# Patient Record
Sex: MALE | Race: WHITE | NOT HISPANIC OR LATINO | Employment: UNEMPLOYED
[De-identification: names, ages, dates, MRNs, and addresses within clinical notes are randomized per-mention and may not be internally consistent; named-entity substitution may affect disease eponyms.]

---

## 2018-03-21 ENCOUNTER — MYAURORA ACCOUNT LINK (OUTPATIENT)
Dept: OTHER | Age: 58
End: 2018-03-21

## 2018-05-01 ENCOUNTER — APPOINTMENT (OUTPATIENT)
Dept: OCCUPATIONAL MEDICINE | Age: 58
End: 2018-05-01
Attending: FAMILY MEDICINE

## 2020-10-14 ENCOUNTER — APPOINTMENT (OUTPATIENT)
Dept: ULTRASOUND IMAGING | Facility: HOSPITAL | Age: 60
End: 2020-10-14
Attending: HOSPITALIST
Payer: COMMERCIAL

## 2020-10-14 ENCOUNTER — HOSPITAL ENCOUNTER (OUTPATIENT)
Facility: HOSPITAL | Age: 60
Setting detail: OBSERVATION
Discharge: HOME | End: 2020-10-15
Attending: EMERGENCY MEDICINE | Admitting: HOSPITALIST
Payer: COMMERCIAL

## 2020-10-14 DIAGNOSIS — R55 SYNCOPE AND COLLAPSE: Primary | ICD-10-CM

## 2020-10-14 DIAGNOSIS — E83.42 HYPOMAGNESEMIA: ICD-10-CM

## 2020-10-14 DIAGNOSIS — E87.1 HYPONATREMIA: ICD-10-CM

## 2020-10-14 DIAGNOSIS — E87.6 HYPOKALEMIA: ICD-10-CM

## 2020-10-14 PROCEDURE — 99220 INITIAL OBSERVATION CARE,LEVL III: CPT | Performed by: HOSPITALIST

## 2020-10-14 PROCEDURE — 93880 EXTRACRANIAL BILAT STUDY: CPT | Performed by: HOSPITALIST

## 2020-10-14 RX ORDER — CITALOPRAM 10 MG/1
10 TABLET ORAL DAILY
COMMUNITY
Start: 2020-09-25

## 2020-10-14 RX ORDER — PANTOPRAZOLE SODIUM 40 MG/1
40 TABLET, DELAYED RELEASE ORAL DAILY
Status: DISCONTINUED | OUTPATIENT
Start: 2020-10-14 | End: 2020-10-15

## 2020-10-14 RX ORDER — ESCITALOPRAM OXALATE 10 MG/1
10 TABLET ORAL DAILY
Status: DISCONTINUED | OUTPATIENT
Start: 2020-10-14 | End: 2020-10-15

## 2020-10-14 RX ORDER — SODIUM CHLORIDE 9 MG/ML
INJECTION, SOLUTION INTRAVENOUS CONTINUOUS
Status: DISCONTINUED | OUTPATIENT
Start: 2020-10-14 | End: 2020-10-15

## 2020-10-14 RX ORDER — LINAGLIPTIN 5 MG/1
5 TABLET, FILM COATED ORAL DAILY
COMMUNITY
Start: 2020-07-23

## 2020-10-14 RX ORDER — GABAPENTIN 300 MG/1
300 CAPSULE ORAL 2 TIMES DAILY
Status: DISCONTINUED | OUTPATIENT
Start: 2020-10-14 | End: 2020-10-15

## 2020-10-14 RX ORDER — SIMVASTATIN 20 MG
20 TABLET ORAL NIGHTLY
Status: ON HOLD | COMMUNITY
Start: 2020-09-08 | End: 2021-04-16 | Stop reason: ALTCHOICE

## 2020-10-14 RX ORDER — LORAZEPAM 1 MG/1
1 TABLET ORAL
Status: DISCONTINUED | OUTPATIENT
Start: 2020-10-14 | End: 2020-10-15

## 2020-10-14 RX ORDER — MULTIPLE VITAMINS W/ MINERALS TAB 9MG-400MCG
1 TAB ORAL DAILY
Status: DISCONTINUED | OUTPATIENT
Start: 2020-10-14 | End: 2020-10-15

## 2020-10-14 RX ORDER — SODIUM CHLORIDE 0.9 % (FLUSH) 0.9 %
3 SYRINGE (ML) INJECTION AS NEEDED
Status: DISCONTINUED | OUTPATIENT
Start: 2020-10-14 | End: 2020-10-15

## 2020-10-14 RX ORDER — LORAZEPAM 1 MG/1
2 TABLET ORAL
Status: DISCONTINUED | OUTPATIENT
Start: 2020-10-14 | End: 2020-10-15

## 2020-10-14 RX ORDER — ONDANSETRON 2 MG/ML
4 INJECTION INTRAMUSCULAR; INTRAVENOUS EVERY 6 HOURS PRN
Status: DISCONTINUED | OUTPATIENT
Start: 2020-10-14 | End: 2020-10-15

## 2020-10-14 RX ORDER — LORAZEPAM 2 MG/ML
2 INJECTION INTRAMUSCULAR
Status: DISCONTINUED | OUTPATIENT
Start: 2020-10-14 | End: 2020-10-15

## 2020-10-14 RX ORDER — CANAGLIFLOZIN 100 MG/1
100 TABLET, FILM COATED ORAL DAILY
COMMUNITY
Start: 2020-09-27

## 2020-10-14 RX ORDER — MELATONIN
100 DAILY
Status: DISCONTINUED | OUTPATIENT
Start: 2020-10-15 | End: 2020-10-15

## 2020-10-14 RX ORDER — LORAZEPAM 2 MG/ML
1 INJECTION INTRAMUSCULAR
Status: DISCONTINUED | OUTPATIENT
Start: 2020-10-14 | End: 2020-10-15

## 2020-10-14 RX ORDER — MAGNESIUM SULFATE HEPTAHYDRATE 40 MG/ML
2 INJECTION, SOLUTION INTRAVENOUS ONCE
Status: COMPLETED | OUTPATIENT
Start: 2020-10-14 | End: 2020-10-14

## 2020-10-14 RX ORDER — ACETAMINOPHEN 325 MG/1
650 TABLET ORAL EVERY 6 HOURS PRN
Status: DISCONTINUED | OUTPATIENT
Start: 2020-10-14 | End: 2020-10-15

## 2020-10-14 RX ORDER — METOCLOPRAMIDE HYDROCHLORIDE 5 MG/ML
10 INJECTION INTRAMUSCULAR; INTRAVENOUS EVERY 8 HOURS PRN
Status: DISCONTINUED | OUTPATIENT
Start: 2020-10-14 | End: 2020-10-15

## 2020-10-14 RX ORDER — DEXTROSE MONOHYDRATE 25 G/50ML
50 INJECTION, SOLUTION INTRAVENOUS
Status: DISCONTINUED | OUTPATIENT
Start: 2020-10-14 | End: 2020-10-15

## 2020-10-14 RX ORDER — PANTOPRAZOLE SODIUM 40 MG/1
40 TABLET, DELAYED RELEASE ORAL DAILY
COMMUNITY
Start: 2020-09-22

## 2020-10-14 RX ORDER — ERGOCALCIFEROL 1.25 MG/1
5000 CAPSULE ORAL
COMMUNITY
Start: 2020-09-27

## 2020-10-14 RX ORDER — GABAPENTIN 300 MG/1
300 CAPSULE ORAL 2 TIMES DAILY
COMMUNITY
Start: 2020-06-19

## 2020-10-14 RX ORDER — FOLIC ACID 1 MG/1
1 TABLET ORAL DAILY
Status: DISCONTINUED | OUTPATIENT
Start: 2020-10-14 | End: 2020-10-15

## 2020-10-14 RX ORDER — POTASSIUM CHLORIDE 20 MEQ/1
40 TABLET, EXTENDED RELEASE ORAL EVERY 4 HOURS
Status: COMPLETED | OUTPATIENT
Start: 2020-10-14 | End: 2020-10-14

## 2020-10-14 RX ORDER — POTASSIUM CHLORIDE 20 MEQ/1
40 TABLET, EXTENDED RELEASE ORAL ONCE
Status: COMPLETED | OUTPATIENT
Start: 2020-10-14 | End: 2020-10-14

## 2020-10-14 RX ORDER — LISINOPRIL 5 MG/1
5 TABLET ORAL DAILY
Status: ON HOLD | COMMUNITY
Start: 2020-09-25 | End: 2021-04-16 | Stop reason: ALTCHOICE

## 2020-10-14 RX ORDER — LISINOPRIL 5 MG/1
5 TABLET ORAL DAILY
Status: DISCONTINUED | OUTPATIENT
Start: 2020-10-14 | End: 2020-10-15

## 2020-10-14 NOTE — ED PROVIDER NOTES
Patient Seen in: [facility]       History   Patient presents with:  Syncope    Stated Complaint: syncope; ST elevation    HPI    64year old male with a history of hypertension, daily alcohol use who presents with syncope.   There was a STEMI alert General: He is not in acute distress. Appearance: He is well-developed. He is not toxic-appearing or diaphoretic. HENT:      Head: Normocephalic and atraumatic.       Right Ear: External ear normal.      Left Ear: External ear normal.   Eyes: ALCOHOL - Abnormal; Notable for the following components:    Ethyl Alcohol 257 (*)     All other components within normal limits   MAGNESIUM - Abnormal; Notable for the following components:    Magnesium 1.2 (*)     All other components within normal limit results for these tests on the individual orders. RAINBOW DRAW BLUE   RAINBOW DRAW LAVENDER   RAINBOW DRAW LIGHT GREEN   RAINBOW DRAW GOLD     EKG    Rate, intervals and axes as noted on EKG Report.   Rate: 109  Rhythm: Sinus Rhythm  Reading: Sinus tachyc encounter diagnosis)  Hypokalemia  Hyponatremia  Hypomagnesemia    Disposition:  Admit  10/14/2020  1:42 pm    Follow-up:  No follow-up provider specified.         Medications Prescribed:  Current Discharge Medication List                       Present on A

## 2020-10-14 NOTE — H&P
[facility]    PATIENT'S NAME: Santosh Huber   ATTENDING PHYSICIAN: Nadine Doshi MD   PATIENT ACCOUNT#:   [id number]    LOCATION:  [address] [place] [address] 107 RECORD #:   [id number]       YOB: 1960  ADMISSION DATE:       10/14/2020 to time, place, and person in mild distress. VITAL SIGNS:  Temperature 97.6, pulse 108, respiratory rate 15, blood pressure 135/90, pulse ox 97% on room air. HEENT:  Atraumatic. Oropharynx clear. Dry mucous membranes. Normal hard and soft palate.    EY

## 2020-10-14 NOTE — ED NOTES
Assumed care to this pt , received report from Karime Chavez RN  Per report pt brought in by EMS, pt was brought here from the Bar, pt had syncopal episode, passed out at the bar happened around 1110 today.   Per pt's wife, she was outside the bar waiting for the

## 2020-10-14 NOTE — ED NOTES
Orders for admission, patient is aware of plan and ready to go upstairs. Any questions, please call ED RN Pastor Scott   at extension [phone].    Type of COVID test sent:  COVID Suspicion level: Low Result Not Detected    Titratable drug(s) infusing:Magnesium IV 2 g

## 2020-10-14 NOTE — CONSULTS
[facility] Cardiology Consult Note    Fátima uBtt Patient Status:  Emergency    1960 MRN [id number]   Location [address] Attending No att. providers found   Hosp Day # 0 PCP No primary care provider on file.      Reaso MD  [facility]    --------------------------------------------------------------------------------------------------------------------------------  ROS 10 systems reviewed, pertinent findings above. ROS    History:  DM  Neuropathy  ? NPH    No family history

## 2020-10-14 NOTE — PLAN OF CARE
This evening patient has been admitted onto the cardiac unit. NS running at 100mL/hr with Thiamine IV ordered. Plan is for 2D ECHO and US carotid.  Bed locked in lowest position, bed alarm activated, call light within reach, and frequent rounding in progres compliance  - See additional Care Plan goals for specific interventions  Outcome: Progressing     Problem: ANXIETY  Goal: Will report anxiety at manageable levels  Description: INTERVENTIONS:  - Administer medication as ordered  - Teach and rehearse altern Worker, Psychosocial CNS, Spiritual Care as appropriate  Outcome: Progressing     Problem: DRUG ABUSE/DETOX  Goal: Will have no detox symptoms and will verbalize plan for changing drug-related behavior  Description: INTERVENTIONS:  - Administer medication appropriate  - Consider OT/PT consult to assist with strengthening/mobility  - Encourage toileting schedule  Outcome: Progressing     Problem: DISCHARGE PLANNING  Goal: Discharge to home or other facility with appropriate resources  Description: INTERVENTI threatening arrhythmias  - Monitor electrolytes and administer replacement therapy as ordered  Outcome: Progressing

## 2020-10-14 NOTE — ED INITIAL ASSESSMENT (HPI)
Pt via EMS from bar for syncopal episode after having 5 drinks. Medics report ST elevation, gray in color and on ground upon arrival.  Denies chest pain. Reports feeling tired. .

## 2020-10-15 ENCOUNTER — APPOINTMENT (OUTPATIENT)
Dept: CV DIAGNOSTICS | Facility: HOSPITAL | Age: 60
End: 2020-10-15
Attending: HOSPITALIST
Payer: COMMERCIAL

## 2020-10-15 VITALS
DIASTOLIC BLOOD PRESSURE: 83 MMHG | WEIGHT: 280.69 LBS | HEIGHT: 72 IN | SYSTOLIC BLOOD PRESSURE: 157 MMHG | HEART RATE: 99 BPM | BODY MASS INDEX: 38.02 KG/M2 | RESPIRATION RATE: 16 BRPM | TEMPERATURE: 98 F | OXYGEN SATURATION: 96 %

## 2020-10-15 PROCEDURE — 93306 TTE W/DOPPLER COMPLETE: CPT | Performed by: HOSPITALIST

## 2020-10-15 PROCEDURE — 99217 OBSERVATION CARE DISCHARGE: CPT | Performed by: HOSPITALIST

## 2020-10-15 RX ORDER — POTASSIUM CHLORIDE 20 MEQ/1
40 TABLET, EXTENDED RELEASE ORAL ONCE
Status: COMPLETED | OUTPATIENT
Start: 2020-10-15 | End: 2020-10-15

## 2020-10-15 RX ORDER — MULTIPLE VITAMINS W/ MINERALS TAB 9MG-400MCG
1 TAB ORAL DAILY
Qty: 30 TABLET | Refills: 0 | Status: SHIPPED | OUTPATIENT
Start: 2020-10-16

## 2020-10-15 RX ORDER — MAGNESIUM OXIDE 400 MG (241.3 MG MAGNESIUM) TABLET
400 TABLET 2 TIMES DAILY
Qty: 60 TABLET | Refills: 0 | Status: SHIPPED | OUTPATIENT
Start: 2020-10-15

## 2020-10-15 RX ORDER — MAGNESIUM OXIDE 400 MG (241.3 MG MAGNESIUM) TABLET
800 TABLET ONCE
Status: COMPLETED | OUTPATIENT
Start: 2020-10-15 | End: 2020-10-15

## 2020-10-15 RX ORDER — FOLIC ACID 1 MG/1
1 TABLET ORAL DAILY
Qty: 30 TABLET | Refills: 0 | Status: SHIPPED | OUTPATIENT
Start: 2020-10-16

## 2020-10-15 RX ORDER — MELATONIN
100 DAILY
Qty: 30 TABLET | Refills: 0 | Status: SHIPPED | OUTPATIENT
Start: 2020-10-16

## 2020-10-15 NOTE — CM/SW NOTE
Received MDO for ETOH/Substance Abuse. SW contacted pt's RN and confirmed dillon/rema Quiroga has been consulted for resources. SW/CM to remain available for support and/or discharge planning.        Joselito Rich, [facility]

## 2020-10-15 NOTE — PLAN OF CARE
Pt denies chest pain or any discomfort at this time. Low scoring CIWA, will continue to monitor. Some mild nausea was noted after receiving evening medications including potassium. Zofran given.  Dr Maria Luisa Raman was paged per patient's request for sleep medic better    Interventions:   - Administer prescribed medications  - Promote maximum self care  - Emphasize importance follow-ups  - Exemplify importance of compliance  - See additional Care Plan goals for specific interventions  Outcome: Progressing     Prob strengthening/mobility  - Encourage toileting schedule  Outcome: Progressing     Problem: DISCHARGE PLANNING  Goal: Discharge to home or other facility with appropriate resources  Description: INTERVENTIONS:  - Identify barriers to discharge w/pt and careg

## 2020-10-15 NOTE — BH PROGRESS NOTE
Psych Liaison was consulted to provide discharging patient with CD resources. Psych Liaison provided outpatient addiction therapists and AA resources in discharge instructions. Team reports no safety concerns.     Rowan Press [id number] Raman Jacob Se [id number]

## 2020-10-15 NOTE — PLAN OF CARE
Pt is A/O x 4, denies pain, denies SOB. To be discharged today with wife. Went over discharge instructions and education on hypokalemia and syncope. Explained importance of reccomended follow up appointments.  Went over medications and side effects with alpesh Term Goal: To feel better    Interventions:   - Administer prescribed medications  - Promote maximum self care  - Emphasize importance follow-ups  - Exemplify importance of compliance  - See additional Care Plan goals for specific interventions  Outcome: C patient, staff, or others refer to organization policy.  If a visitor’s behavior poses a threat to safety call refer to organization policy.  - Initiate consult with , Psychosocial CNS, Spiritual Care as appropriate  Outcome: Completed     Prob limitations  - Instruct pt to call for assistance with activity based on assessment  - Modify environment to reduce risk of injury  - Provide assistive devices as appropriate  - Consider OT/PT consult to assist with strengthening/mobility  - Encourage toil 12 lead EKG if indicated  - Evaluate effectiveness of antiarrhythmic and heart rate control medications as ordered  - Initiate emergency measures for life threatening arrhythmias  - Monitor electrolytes and administer replacement therapy as ordered  Outcom

## 2020-10-15 NOTE — PROGRESS NOTES
[facility] Cardiology Consult Note    Cornelia Browning Patient Status:  Emergency    1960 MRN [id number]   Location [address] Attending No att. providers found   Hosp Day # 0 PCP No primary care provider on file.      Reaso

## 2020-10-26 NOTE — DISCHARGE SUMMARY
[facility] HOSPITALIST  DISCHARGE SUMMARY     Chapin Hankins Patient Status:  Observation    1960 MRN [id number]   Location [facility] 3W/SW Attending No att. providers found   Hosp Day # 0 PCP No primary care provider on file.      Date of A discuss with PCP.       •      Things to follow up on by PCP:  ·  ECHO  · Anemia     •      Discharge Medication List:     Discharge Medications      START taking these medications      Instructions Prescription details   folic acid 1 MG Tabs  Commonly know ZOCOR      Take 20 mg by mouth nightly. TAKE 137 MCG BY MOUTH. Refills: 0     Tradjenta 5 mg Tabs  Generic drug: linagliptin      Take 5 mg by mouth daily.    Refills: 0           Where to Get Your Medications      Please  your prescriptions at the

## 2021-01-06 ENCOUNTER — APPOINTMENT (OUTPATIENT)
Dept: CARDIOLOGY | Age: 61
End: 2021-01-06

## 2021-04-15 ENCOUNTER — APPOINTMENT (OUTPATIENT)
Dept: CT IMAGING | Facility: HOSPITAL | Age: 61
End: 2021-04-15
Payer: COMMERCIAL

## 2021-04-15 ENCOUNTER — HOSPITAL ENCOUNTER (OUTPATIENT)
Facility: HOSPITAL | Age: 61
Setting detail: OBSERVATION
Discharge: HOME | End: 2021-04-16
Attending: EMERGENCY MEDICINE | Admitting: STUDENT IN AN ORGANIZED HEALTH CARE EDUCATION/TRAINING PROGRAM
Payer: COMMERCIAL

## 2021-04-15 DIAGNOSIS — N30.00 ACUTE CYSTITIS WITHOUT HEMATURIA: ICD-10-CM

## 2021-04-15 DIAGNOSIS — H53.9 VISUAL DISTURBANCE: Primary | ICD-10-CM

## 2021-04-15 DIAGNOSIS — R41.0 CONFUSION: ICD-10-CM

## 2021-04-15 PROBLEM — R73.9 HYPERGLYCEMIA: Status: ACTIVE | Noted: 2021-04-15

## 2021-04-15 PROCEDURE — 70450 CT HEAD/BRAIN W/O DYE: CPT | Performed by: EMERGENCY MEDICINE

## 2021-04-15 RX ORDER — CLOPIDOGREL BISULFATE 75 MG/1
75 TABLET ORAL DAILY
Status: DISCONTINUED | OUTPATIENT
Start: 2021-04-15 | End: 2021-04-16

## 2021-04-15 RX ORDER — ACETAMINOPHEN 325 MG/1
650 TABLET ORAL EVERY 4 HOURS PRN
Status: DISCONTINUED | OUTPATIENT
Start: 2021-04-15 | End: 2021-04-16

## 2021-04-15 RX ORDER — ONDANSETRON 2 MG/ML
4 INJECTION INTRAMUSCULAR; INTRAVENOUS ONCE
Status: COMPLETED | OUTPATIENT
Start: 2021-04-15 | End: 2021-04-15

## 2021-04-15 RX ORDER — ENOXAPARIN SODIUM 100 MG/ML
40 INJECTION SUBCUTANEOUS DAILY
Status: DISCONTINUED | OUTPATIENT
Start: 2021-04-15 | End: 2021-04-16

## 2021-04-15 RX ORDER — ACETAMINOPHEN 650 MG/1
650 SUPPOSITORY RECTAL EVERY 4 HOURS PRN
Status: DISCONTINUED | OUTPATIENT
Start: 2021-04-15 | End: 2021-04-16

## 2021-04-15 RX ORDER — HYDRALAZINE HYDROCHLORIDE 20 MG/ML
10 INJECTION INTRAMUSCULAR; INTRAVENOUS EVERY 2 HOUR PRN
Status: DISCONTINUED | OUTPATIENT
Start: 2021-04-15 | End: 2021-04-16

## 2021-04-15 RX ORDER — LABETALOL HYDROCHLORIDE 5 MG/ML
20 INJECTION, SOLUTION INTRAVENOUS ONCE
Status: COMPLETED | OUTPATIENT
Start: 2021-04-15 | End: 2021-04-15

## 2021-04-15 RX ORDER — SODIUM CHLORIDE 9 MG/ML
INJECTION, SOLUTION INTRAVENOUS CONTINUOUS
Status: DISCONTINUED | OUTPATIENT
Start: 2021-04-15 | End: 2021-04-16

## 2021-04-15 RX ORDER — DEXTROSE MONOHYDRATE 25 G/50ML
50 INJECTION, SOLUTION INTRAVENOUS
Status: DISCONTINUED | OUTPATIENT
Start: 2021-04-15 | End: 2021-04-16

## 2021-04-15 RX ORDER — SODIUM CHLORIDE 9 MG/ML
INJECTION, SOLUTION INTRAVENOUS CONTINUOUS
Status: CANCELLED | OUTPATIENT
Start: 2021-04-15 | End: 2021-04-15

## 2021-04-15 RX ORDER — ACETAMINOPHEN 325 MG/1
650 TABLET ORAL EVERY 6 HOURS PRN
Status: DISCONTINUED | OUTPATIENT
Start: 2021-04-15 | End: 2021-04-15

## 2021-04-15 RX ORDER — SODIUM CHLORIDE 9 MG/ML
INJECTION, SOLUTION INTRAVENOUS CONTINUOUS
Status: ACTIVE | OUTPATIENT
Start: 2021-04-15 | End: 2021-04-15

## 2021-04-15 RX ORDER — ATORVASTATIN CALCIUM 40 MG/1
40 TABLET, FILM COATED ORAL NIGHTLY
Status: DISCONTINUED | OUTPATIENT
Start: 2021-04-15 | End: 2021-04-16

## 2021-04-15 RX ORDER — ONDANSETRON 2 MG/ML
4 INJECTION INTRAMUSCULAR; INTRAVENOUS EVERY 6 HOURS PRN
Status: DISCONTINUED | OUTPATIENT
Start: 2021-04-15 | End: 2021-04-16

## 2021-04-15 RX ORDER — LABETALOL HYDROCHLORIDE 5 MG/ML
10 INJECTION, SOLUTION INTRAVENOUS EVERY 10 MIN PRN
Status: DISCONTINUED | OUTPATIENT
Start: 2021-04-15 | End: 2021-04-16

## 2021-04-15 RX ORDER — HEPARIN SODIUM 5000 [USP'U]/ML
5000 INJECTION, SOLUTION INTRAVENOUS; SUBCUTANEOUS EVERY 12 HOURS SCHEDULED
Status: DISCONTINUED | OUTPATIENT
Start: 2021-04-16 | End: 2021-04-16

## 2021-04-15 RX ORDER — ASPIRIN 81 MG/1
81 TABLET, CHEWABLE ORAL DAILY
Status: DISCONTINUED | OUTPATIENT
Start: 2021-04-15 | End: 2021-04-16

## 2021-04-15 NOTE — ED INITIAL ASSESSMENT (HPI)
Dr Jay Strickland at bedside    Pt arrived via EMS for \"visual disturbance\" starting at approximately [phone]. Wife reported pt was experiencing difficulty seeing his walker and a glass of water in his peripheral vision.  Pt is alert, oriented and verbal. No weakness

## 2021-04-16 ENCOUNTER — APPOINTMENT (OUTPATIENT)
Dept: CV DIAGNOSTICS | Facility: HOSPITAL | Age: 61
End: 2021-04-16
Attending: Other
Payer: COMMERCIAL

## 2021-04-16 ENCOUNTER — APPOINTMENT (OUTPATIENT)
Dept: MRI IMAGING | Facility: HOSPITAL | Age: 61
End: 2021-04-16
Attending: Other
Payer: COMMERCIAL

## 2021-04-16 ENCOUNTER — APPOINTMENT (OUTPATIENT)
Dept: ULTRASOUND IMAGING | Facility: HOSPITAL | Age: 61
End: 2021-04-16
Attending: Other
Payer: COMMERCIAL

## 2021-04-16 VITALS
WEIGHT: 259.69 LBS | BODY MASS INDEX: 35.17 KG/M2 | DIASTOLIC BLOOD PRESSURE: 73 MMHG | OXYGEN SATURATION: 100 % | SYSTOLIC BLOOD PRESSURE: 127 MMHG | HEART RATE: 87 BPM | HEIGHT: 72 IN | TEMPERATURE: 98 F | RESPIRATION RATE: 16 BRPM

## 2021-04-16 PROCEDURE — B246ZZZ ULTRASONOGRAPHY OF RIGHT AND LEFT HEART: ICD-10-PCS | Performed by: STUDENT IN AN ORGANIZED HEALTH CARE EDUCATION/TRAINING PROGRAM

## 2021-04-16 PROCEDURE — 70551 MRI BRAIN STEM W/O DYE: CPT | Performed by: OTHER

## 2021-04-16 PROCEDURE — 99244 OFF/OP CNSLTJ NEW/EST MOD 40: CPT | Performed by: OTHER

## 2021-04-16 PROCEDURE — 93306 TTE W/DOPPLER COMPLETE: CPT | Performed by: OTHER

## 2021-04-16 PROCEDURE — 93880 EXTRACRANIAL BILAT STUDY: CPT | Performed by: OTHER

## 2021-04-16 RX ORDER — DIAZEPAM 5 MG/1
10 TABLET ORAL ONCE AS NEEDED
Status: COMPLETED | OUTPATIENT
Start: 2021-04-16 | End: 2021-04-16

## 2021-04-16 RX ORDER — ROSUVASTATIN CALCIUM 20 MG/1
20 TABLET, COATED ORAL NIGHTLY
Status: ON HOLD | COMMUNITY
End: 2021-04-16

## 2021-04-16 RX ORDER — ASPIRIN 81 MG/1
81 TABLET, CHEWABLE ORAL DAILY
Qty: 21 TABLET | Refills: 0 | Status: SHIPPED | OUTPATIENT
Start: 2021-04-17 | End: 2021-05-08

## 2021-04-16 RX ORDER — ROSUVASTATIN CALCIUM 20 MG/1
40 TABLET, COATED ORAL NIGHTLY
Status: DISCONTINUED | OUTPATIENT
Start: 2021-04-16 | End: 2021-04-16

## 2021-04-16 RX ORDER — ROSUVASTATIN CALCIUM 40 MG/1
40 TABLET, COATED ORAL NIGHTLY
Qty: 30 TABLET | Refills: 0 | Status: SHIPPED | OUTPATIENT
Start: 2021-04-16 | End: 2021-05-16

## 2021-04-16 RX ORDER — GABAPENTIN 300 MG/1
300 CAPSULE ORAL 2 TIMES DAILY
Status: DISCONTINUED | OUTPATIENT
Start: 2021-04-16 | End: 2021-04-16

## 2021-04-16 RX ORDER — METOPROLOL SUCCINATE 100 MG/1
100 TABLET, EXTENDED RELEASE ORAL DAILY
COMMUNITY

## 2021-04-16 RX ORDER — TAMSULOSIN HYDROCHLORIDE 0.4 MG/1
0.4 CAPSULE ORAL DAILY
COMMUNITY

## 2021-04-16 RX ORDER — SULFAMETHOXAZOLE AND TRIMETHOPRIM 800; 160 MG/1; MG/1
1 TABLET ORAL 2 TIMES DAILY
Qty: 12 TABLET | Refills: 0 | Status: SHIPPED | OUTPATIENT
Start: 2021-04-16 | End: 2021-04-22

## 2021-04-16 RX ORDER — ROSUVASTATIN CALCIUM 20 MG/1
20 TABLET, COATED ORAL NIGHTLY
Status: DISCONTINUED | OUTPATIENT
Start: 2021-04-16 | End: 2021-04-16

## 2021-04-16 RX ORDER — CLOPIDOGREL BISULFATE 75 MG/1
75 TABLET ORAL DAILY
Qty: 21 TABLET | Refills: 0 | Status: SHIPPED | OUTPATIENT
Start: 2021-04-17 | End: 2021-05-08

## 2021-04-16 NOTE — CONSULTS
[facility]    Report of Consultation    Shaun Adamson Patient Status:  Inpatient    1960 MRN [id number]   Location [facility] 3W/SW Attending [address] [address],  [facility] Day # 1 PCP [place]     Date of Admission:   Nightly  gabapentin (NEURONTIN) cap 300 mg, 300 mg, Oral, BID  ondansetron HCl (ZOFRAN) injection 4 mg, 4 mg, Intravenous, Q6H PRN  glucose (DEX4) oral liquid 15 g, 15 g, Oral, Q15 Min PRN   Or  Glucose-Vitamin C (DEX-4) chewable tab 4 tablet, 4 tablet, Or MG Oral Tab, Take 500 mg by mouth 2 (two) times daily. multivitamin with minerals Oral Tab, Take 1 tablet by mouth daily. Pantoprazole Sodium 40 MG Oral Tab EC, Take 40 mg by mouth daily.   Semaglutide,0.25 or 0.5MG/DOS, 2 MG/1.5ML Subcutaneous Solution P extremities    Deep Tendon Reflexes:  Biceps 2+ bilateral symmetric  Triceps 2+ bilateral symmetric  Brachioradialis 2 + bilateral symmetric  Patellar 1 bilateral symmetric  Ankle jerk 0 bilateral symmetric    No clonus  No Babinski sign    Coordination: past that might contribute to his generalized cerebral atrophy, ataxia and polyneuropathy. TIA work-up was initiated.   MRI of the brain did not show any obvious stroke on my review, however official reading by radiology suggested lacunar stroke with possi

## 2021-04-16 NOTE — ED QUICK NOTES
Orders for admission, patient is aware of plan and ready to go upstairs. Any questions, please call ED RN Alivia Lamb at extension [phone].    Type of COVID test sent:  COVID Suspicion level: Low    Titratable drug(s) infusing:Cefriaxone  Rate: to gravity    LOC a

## 2021-04-16 NOTE — DISCHARGE PLANNING
Patient was provided with discharge instructions, education, and follow up information; patient's wife Ana Cristina Burk was also present for discahrge instructions with patient's consent. Printed prescription for bactirm was called in to patient's pharmacy.  Angela

## 2021-04-16 NOTE — PLAN OF CARE
Patient admitted to this RN from ED. Per patient symptoms resolved on arrival. Patient has some mild hand tremors at baseline. Plan for bubble study today.      Problem: Patient Centered Care  Goal: Patient preferences are identified and integrated in the p patient with low platelets)  INTERVENTIONS:  - Avoid intramuscular injections, enemas and rectal medication administration  - Ensure safe mobilization of patient  - Hold pressure on venipuncture sites to achieve adequate hemostasis  - Assess for signs and handout, if applicable  - Encourage broncho-pulmonary hygiene including cough, deep breathe, Incentive Spirometry  - Assess the need for suctioning and perform as needed  - Assess and instruct to report SOB or any respiratory difficulty  - Respiratory Ther volume excess or deficit  - Monitor intake, output and patient weight  - Monitor urine specific gravity, serum osmolarity and serum sodium as indicated or ordered  - Monitor response to interventions for patient's volume status, including labs, urine outpu and changes in neurological status  - Encourage and assist patient to increase activity and self care with guidance from PT/OT  - Encourage visually impaired, hearing impaired and aphasic patients to use assistive/communication devices  Outcome: Progressin

## 2021-04-16 NOTE — SLP NOTE
ADULT SWALLOWING EVALUATION    ASSESSMENT    ASSESSMENT/OVERALL IMPRESSION:    PPE REQUIRED. THIS SLP WORE GLOVES AND DROPLET MASK. HANDS SANITIZED/WASHED UPON ENTRANCE/EXIT. This BSE was ordered d/t stroke protocol.  Pt on solid/thin liquids at home, wi RN regarding Pt's swallowing plan of care. BSE results/recommendations and standard swallowing precautions discussed with Pt and spouse; good understanding.           PLAN: No speech services required for swallowing treatment as oral and pharyngeal swallow Swallow Study is required to rule-out silent aspiration.)    FOLLOW UP  Treatment Plan/Recommendations: No further inpatient SLP service warranted  Number of Visits to Meet Established Goals: 0  Follow Up Needed (Documentation Required): No  SLP Follow-up

## 2021-04-16 NOTE — PROGRESS NOTES
[facility] Pharmacy Note: Antimicrobial Weight Based Dose Adjustment for: ceftriaxone (ROCEPHIN)    Marlaine Olszewski is a 64year old patient who has been prescribed ceftriaxone (ROCEPHIN) 1000 mg every 24 hours.     Estimated Creatinine Clearance: 81.8 mL/min (bas

## 2021-04-16 NOTE — PHYSICAL THERAPY NOTE
PHYSICAL THERAPY EVALUATION - INPATIENT     Room Number: 400/104-H  Evaluation Date: 4/16/2021  Type of Evaluation: Initial   Physician Order: PT Eval and Treat    Presenting Problem: Vsual disturbance  Reason for Therapy: Mobility Dysfunction and Dischar patient's Approx Degree of Impairment: 41.77% has been calculated based on documentation in the [facility] '6 clicks' Inpatient Basic Mobility Short Form. Research supports that patients with this level of impairment may benefit from home with OP PT.     Gretchen within functional limits     Lower extremity ROM is within functional limits     Lower extremity strength is within functional limits     BALANCE  Static Sitting: Good  Dynamic Sitting: Good  Static Standing: Fair -  Dynamic Standing: Fair -  ACTIVITY MARYLOU demonstrate supine - sit EOB @ level: modified independent     Goal #1   Current Status    Goal #2 Patient is able to demonstrate transfers Sit to/from Stand at assistance level: modified independent with walker - rolling     Goal #2  Current Status    [facility]

## 2021-04-16 NOTE — CM/SW NOTE
Received MDO for home health evaluation. PT/OT recommending outpatient. Met with patient and wife Sea Reyna for assessment. Patient, wife, & his mom live in a ranch style home. Patient uses a walker & shower chair.  Wife assists with ADLs/IADLs, she works f

## 2021-04-16 NOTE — ED PROVIDER NOTES
Patient Seen in: [facility] Emergency Department      History   Patient presents with:   Eye Visual Problem    Stated Complaint:     HPI/Subjective:   HPI    The patient is a 53-year-old male with a history of neuropathy in current work-up for poss 34.68 kg/m²         Physical Exam  Vitals and nursing note reviewed. Constitutional:       General: He is not in acute distress. Appearance: Normal appearance. He is well-developed. He is not ill-appearing.    HENT:      Head: Normocephalic and atraum 203 (*)     Sodium 134 (*)     All other components within normal limits   URINALYSIS WITH CULTURE REFLEX - Abnormal; Notable for the following components:    Clarity Urine Cloudy (*)     Glucose Urine >=500 (*)     Blood Urine Small (*)     Protein Urine Monitor: Pulse Readings from Last 1 Encounters:  04/16/21 : 86  , sinus, normal    Radiology findings: CT STROKE BRAIN (NO IV)(CPT=70450)    Result Date: 4/15/2021  CONCLUSION:   Moderate generalized cerebral atrophy and chronic ischemic white matter green

## 2021-04-16 NOTE — SLP NOTE
SPEECH/LANGUAGE/COGNITIVE EVALUATION - INPATIENT    Admission Date: 4/15/2021  Evaluation Date: 04/16/21    Reason for Referral: Stroke protocol    ASSESSMENT & PLAN   ASSESSMENT & IMPRESSION    MRI Brain 4/16/21:  CONCLUSION:   1.  Acute right basal gangli results/recommendations discussed with Pt and spouse; good  understanding. PLAN:  No formal speech/language treatment required.                   Discharge Recommendations/Plan: Undetermined    Patient Experiencing Pain: No      Prior Living Situatio

## 2021-04-16 NOTE — H&P
[facility] Hospitalist H&P       CC: Patient presents with:   Eye Visual Problem       PCP: Beatriz Baker    History of Present Illness: Patient is a 64year old male with PMH sig for type 2 diabetes, obesity, underlying neurologic disorder (possible Parkinson' MG Oral Tab, Take 1 tablet (400 mg total) by mouth 2 (two) times daily. , Disp: 60 tablet, Rfl: 0  INVOKANA 100 MG Oral Tab, Take 100 mg by mouth daily. , Disp: , Rfl:   Citalopram Hydrobromide 10 MG Oral Tab, Take 10 mg by mouth daily. , Disp: , Rfl:   ergoc appreciated     Diagnostic Data:    CBC/Chem  Recent Labs   Lab 04/15/21  1847 04/16/21  0530   WBC 9.9 10.4   HGB 12.5* 11.5*   MCV 92.8 92.5   .0 153.0       Recent Labs   Lab 04/15/21  1847 04/16/21  0530   * 135*   K 3.9 3.9    [facility]     Dictated by (CST): Asael Crespo MD on 4/15/2021 at 7:01 PM     Finalized by (CST): Asael Crespo MD on 4/15/2021 at 7:07 PM              ASSESSMENT / PLAN:        Patient is a 64year old male with PMH sig for type 2 diabetes, obesity, underlyi

## 2021-04-16 NOTE — OCCUPATIONAL THERAPY NOTE
OCCUPATIONAL THERAPY EVALUATION - INPATIENT     Room Number: 521/153-V  Evaluation Date: 4/16/2021  Type of Evaluation: Initial  Presenting Problem:  (visual disturbance;r/o cva)    Physician Order: IP Consult to Occupational Therapy  Reason for Therapy: needs  at this time. Patient will be discharged from Occupational Therapy services. Please re-order if a new functional limitation presents during this admission.     OCCUPATIONAL THERAPY MEDICAL/SOCIAL HISTORY     Problem List  Principal Problem:    Adan Gardner ACTIVITIES OF DAILY LIVING ASSESSMENT  AM-PAC ‘6-Clicks’ Inpatient Daily Activity Short Form  How much help from another person does the patient currently need…  -   Putting on and taking off regular lower body clothing?: A Little  -   Bathing (ishmaeli

## 2021-04-17 NOTE — DISCHARGE SUMMARY
[facility]  General Medicine Discharge Summary     Patient ID:  Greta Feldman  64year old  12/17/1960    Admit date: 4/15/2021    Discharge date and time: 4/16/2021  3:54 PM     Consults: IP CONSULT TO NEUROLOGY  IP CONSULT TO SOCIAL WORK    Primary Care Physici patient was discharged suggested Acute right basal ganglia infarct measuring 2.2 cm with subtle restricted diffusion and increased T1 signal intensity suggesting hemorrhagic transformation. I personally discussed this with neurologist Dr. Diaz.  Dr. Zev Mcintosh signal intensity suggesting hemorrhagic transformation. 2.  Cerebral cortical atrophy. Chronic white matter microvascular ischemic changes. Chronic bilateral thalamic lacunar infarcts. Mild Cerebellar atrophy, age appropriate 3.  Bilateral maxillary sinu days.        CHANGE how you take these medications    Rosuvastatin Calcium 40 MG Tabs  Commonly known as: CRESTOR  Take 1 tablet (40 mg total) by mouth nightly.   What changed:   · medication strength  · how much to take        CONTINUE taking these medicat medications. Contact information:  [facility]  [phone]             Please follow up. Contact information:  Follow up with your primary neurologist within 2 weeks.                   DC

## 2022-12-31 ENCOUNTER — APPOINTMENT (OUTPATIENT)
Dept: MRI IMAGING | Facility: HOSPITAL | Age: 62
End: 2022-12-31
Attending: EMERGENCY MEDICINE
Payer: COMMERCIAL

## 2022-12-31 ENCOUNTER — HOSPITAL ENCOUNTER (INPATIENT)
Facility: HOSPITAL | Age: 62
LOS: 2 days | Discharge: HOME HEALTH | End: 2023-01-02
Attending: EMERGENCY MEDICINE | Admitting: HOSPITALIST
Payer: COMMERCIAL

## 2022-12-31 ENCOUNTER — APPOINTMENT (OUTPATIENT)
Dept: CT IMAGING | Facility: HOSPITAL | Age: 62
End: 2022-12-31
Attending: EMERGENCY MEDICINE
Payer: COMMERCIAL

## 2022-12-31 ENCOUNTER — HOSPITAL ENCOUNTER (INPATIENT)
Facility: HOSPITAL | Age: 62
LOS: 2 days | Discharge: HOME HEALTH | End: 2023-01-02
Attending: EMERGENCY MEDICINE
Payer: COMMERCIAL

## 2022-12-31 ENCOUNTER — APPOINTMENT (OUTPATIENT)
Dept: CT IMAGING | Facility: HOSPITAL | Age: 62
End: 2022-12-31
Payer: COMMERCIAL

## 2022-12-31 DIAGNOSIS — R29.90 STROKE-LIKE SYMPTOMS: Primary | ICD-10-CM

## 2022-12-31 LAB
ANION GAP SERPL CALC-SCNC: 6 MMOL/L (ref 0–18)
BASOPHILS # BLD AUTO: 0.1 X10(3) UL (ref 0–0.2)
BASOPHILS NFR BLD AUTO: 0.7 %
BUN BLD-MCNC: 10 MG/DL (ref 7–18)
BUN/CREAT SERPL: 9.5 (ref 10–20)
CALCIUM BLD-MCNC: 7.3 MG/DL (ref 8.5–10.1)
CHLORIDE SERPL-SCNC: 99 MMOL/L (ref 98–112)
CO2 SERPL-SCNC: 29 MMOL/L (ref 21–32)
CREAT BLD-MCNC: 1.05 MG/DL
DEPRECATED RDW RBC AUTO: 49.1 FL (ref 35.1–46.3)
EOSINOPHIL # BLD AUTO: 0.06 X10(3) UL (ref 0–0.7)
EOSINOPHIL NFR BLD AUTO: 0.4 %
ERYTHROCYTE [DISTWIDTH] IN BLOOD BY AUTOMATED COUNT: 14.3 % (ref 11–15)
ETHANOL SERPL-MCNC: <3 MG/DL (ref ?–3)
GFR SERPLBLD BASED ON 1.73 SQ M-ARVRAT: 81 ML/MIN/1.73M2 (ref 60–?)
GLUCOSE BLD-MCNC: 117 MG/DL (ref 70–99)
GLUCOSE BLDC GLUCOMTR-MCNC: 127 MG/DL (ref 70–99)
GLUCOSE BLDC GLUCOMTR-MCNC: 91 MG/DL (ref 70–99)
HCT VFR BLD AUTO: 36.6 %
HGB BLD-MCNC: 12.2 G/DL
IMM GRANULOCYTES # BLD AUTO: 0.09 X10(3) UL (ref 0–1)
IMM GRANULOCYTES NFR BLD: 0.6 %
LYMPHOCYTES # BLD AUTO: 3.79 X10(3) UL (ref 1–4)
LYMPHOCYTES NFR BLD AUTO: 26.3 %
MCH RBC QN AUTO: 31.1 PG (ref 26–34)
MCHC RBC AUTO-ENTMCNC: 33.3 G/DL (ref 31–37)
MCV RBC AUTO: 93.4 FL
MONOCYTES # BLD AUTO: 1.05 X10(3) UL (ref 0.1–1)
MONOCYTES NFR BLD AUTO: 7.3 %
NEUTROPHILS # BLD AUTO: 9.32 X10 (3) UL (ref 1.5–7.7)
NEUTROPHILS # BLD AUTO: 9.32 X10(3) UL (ref 1.5–7.7)
NEUTROPHILS NFR BLD AUTO: 64.7 %
OSMOLALITY SERPL CALC.SUM OF ELEC: 278 MOSM/KG (ref 275–295)
PLATELET # BLD AUTO: 224 10(3)UL (ref 150–450)
POTASSIUM SERPL-SCNC: 3 MMOL/L (ref 3.5–5.1)
RBC # BLD AUTO: 3.92 X10(6)UL
SARS-COV-2 RNA RESP QL NAA+PROBE: NOT DETECTED
SODIUM SERPL-SCNC: 134 MMOL/L (ref 136–145)
WBC # BLD AUTO: 14.4 X10(3) UL (ref 4–11)

## 2022-12-31 PROCEDURE — 70498 CT ANGIOGRAPHY NECK: CPT

## 2022-12-31 PROCEDURE — 70450 CT HEAD/BRAIN W/O DYE: CPT | Performed by: EMERGENCY MEDICINE

## 2022-12-31 PROCEDURE — 99223 1ST HOSP IP/OBS HIGH 75: CPT | Performed by: HOSPITALIST

## 2022-12-31 PROCEDURE — 70551 MRI BRAIN STEM W/O DYE: CPT | Performed by: EMERGENCY MEDICINE

## 2022-12-31 PROCEDURE — 70496 CT ANGIOGRAPHY HEAD: CPT

## 2022-12-31 RX ORDER — ESCITALOPRAM OXALATE 10 MG/1
10 TABLET ORAL DAILY
Status: DISCONTINUED | OUTPATIENT
Start: 2023-01-01 | End: 2023-01-02

## 2022-12-31 RX ORDER — ASPIRIN 81 MG/1
81 TABLET ORAL DAILY
COMMUNITY

## 2022-12-31 RX ORDER — LORAZEPAM 2 MG/ML
1 INJECTION INTRAMUSCULAR ONCE
Status: COMPLETED | OUTPATIENT
Start: 2022-12-31 | End: 2022-12-31

## 2022-12-31 RX ORDER — MULTIPLE VITAMINS W/ MINERALS TAB 9MG-400MCG
1 TAB ORAL DAILY
Status: DISCONTINUED | OUTPATIENT
Start: 2023-01-01 | End: 2023-01-02

## 2022-12-31 RX ORDER — GABAPENTIN 300 MG/1
300 CAPSULE ORAL 2 TIMES DAILY
Status: DISCONTINUED | OUTPATIENT
Start: 2022-12-31 | End: 2023-01-02

## 2022-12-31 RX ORDER — HALOPERIDOL 5 MG/ML
5 INJECTION INTRAMUSCULAR ONCE
Status: COMPLETED | OUTPATIENT
Start: 2023-01-01 | End: 2023-01-01

## 2022-12-31 RX ORDER — FOLIC ACID 1 MG/1
1 TABLET ORAL DAILY
Status: DISCONTINUED | OUTPATIENT
Start: 2023-01-01 | End: 2023-01-02

## 2022-12-31 RX ORDER — ASPIRIN 81 MG/1
81 TABLET ORAL DAILY
Status: DISCONTINUED | OUTPATIENT
Start: 2023-01-01 | End: 2023-01-02

## 2022-12-31 RX ORDER — NICOTINE POLACRILEX 4 MG
15 LOZENGE BUCCAL
Status: DISCONTINUED | OUTPATIENT
Start: 2022-12-31 | End: 2023-01-02

## 2022-12-31 RX ORDER — LEVETIRACETAM 500 MG/5ML
500 INJECTION, SOLUTION, CONCENTRATE INTRAVENOUS ONCE
Status: COMPLETED | OUTPATIENT
Start: 2022-12-31 | End: 2022-12-31

## 2022-12-31 RX ORDER — METOPROLOL SUCCINATE 100 MG/1
100 TABLET, EXTENDED RELEASE ORAL DAILY
Status: DISCONTINUED | OUTPATIENT
Start: 2023-01-01 | End: 2023-01-02

## 2022-12-31 RX ORDER — NICOTINE POLACRILEX 4 MG
30 LOZENGE BUCCAL
Status: DISCONTINUED | OUTPATIENT
Start: 2022-12-31 | End: 2023-01-02

## 2022-12-31 RX ORDER — MAGNESIUM HYDROXIDE/ALUMINUM HYDROXICE/SIMETHICONE 120; 1200; 1200 MG/30ML; MG/30ML; MG/30ML
30 SUSPENSION ORAL 4 TIMES DAILY PRN
Status: DISCONTINUED | OUTPATIENT
Start: 2022-12-31 | End: 2023-01-02

## 2022-12-31 RX ORDER — ONDANSETRON 2 MG/ML
4 INJECTION INTRAMUSCULAR; INTRAVENOUS EVERY 6 HOURS PRN
Status: DISCONTINUED | OUTPATIENT
Start: 2022-12-31 | End: 2023-01-02

## 2022-12-31 RX ORDER — DEXTROSE MONOHYDRATE 25 G/50ML
50 INJECTION, SOLUTION INTRAVENOUS
Status: DISCONTINUED | OUTPATIENT
Start: 2022-12-31 | End: 2023-01-02

## 2022-12-31 RX ORDER — HEPARIN SODIUM 5000 [USP'U]/ML
5000 INJECTION, SOLUTION INTRAVENOUS; SUBCUTANEOUS EVERY 12 HOURS
Status: DISCONTINUED | OUTPATIENT
Start: 2022-12-31 | End: 2023-01-02

## 2022-12-31 RX ORDER — PANTOPRAZOLE SODIUM 40 MG/1
40 TABLET, DELAYED RELEASE ORAL
Status: DISCONTINUED | OUTPATIENT
Start: 2023-01-01 | End: 2023-01-02

## 2022-12-31 RX ORDER — LORAZEPAM 2 MG/ML
INJECTION INTRAMUSCULAR
Status: COMPLETED
Start: 2022-12-31 | End: 2022-12-31

## 2022-12-31 RX ORDER — ZOLPIDEM TARTRATE 5 MG/1
5 TABLET ORAL NIGHTLY PRN
Status: DISCONTINUED | OUTPATIENT
Start: 2022-12-31 | End: 2023-01-02

## 2022-12-31 RX ORDER — ACETAMINOPHEN 325 MG/1
650 TABLET ORAL EVERY 6 HOURS PRN
Status: DISCONTINUED | OUTPATIENT
Start: 2022-12-31 | End: 2023-01-02

## 2022-12-31 RX ORDER — TAMSULOSIN HYDROCHLORIDE 0.4 MG/1
0.4 CAPSULE ORAL DAILY
Status: DISCONTINUED | OUTPATIENT
Start: 2023-01-01 | End: 2023-01-02

## 2022-12-31 RX ORDER — HYDRALAZINE HYDROCHLORIDE 20 MG/ML
10 INJECTION INTRAMUSCULAR; INTRAVENOUS EVERY 4 HOURS PRN
Status: DISCONTINUED | OUTPATIENT
Start: 2022-12-31 | End: 2023-01-02

## 2022-12-31 RX ORDER — MELATONIN
100 DAILY
Status: DISCONTINUED | OUTPATIENT
Start: 2023-01-01 | End: 2023-01-02

## 2023-01-01 LAB
AMPHET UR QL SCN: NEGATIVE
ANION GAP SERPL CALC-SCNC: 5 MMOL/L (ref 0–18)
BARBITURATES UR QL SCN: NEGATIVE
BASOPHILS # BLD AUTO: 0.06 X10(3) UL (ref 0–0.2)
BASOPHILS NFR BLD AUTO: 0.8 %
BENZODIAZ UR QL SCN: NEGATIVE
BILIRUB UR QL: NEGATIVE
BUN BLD-MCNC: 10 MG/DL (ref 7–18)
BUN/CREAT SERPL: 11.6 (ref 10–20)
CALCIUM BLD-MCNC: 7.5 MG/DL (ref 8.5–10.1)
CANNABINOIDS UR QL SCN: NEGATIVE
CHLORIDE SERPL-SCNC: 100 MMOL/L (ref 98–112)
CLARITY UR: CLEAR
CO2 SERPL-SCNC: 31 MMOL/L (ref 21–32)
COCAINE UR QL: NEGATIVE
COLOR UR: YELLOW
CREAT BLD-MCNC: 0.86 MG/DL
CREAT UR-SCNC: 265 MG/DL
DEPRECATED RDW RBC AUTO: 50.3 FL (ref 35.1–46.3)
EOSINOPHIL # BLD AUTO: 0.14 X10(3) UL (ref 0–0.7)
EOSINOPHIL NFR BLD AUTO: 2 %
ERYTHROCYTE [DISTWIDTH] IN BLOOD BY AUTOMATED COUNT: 14.5 % (ref 11–15)
EST. AVERAGE GLUCOSE BLD GHB EST-MCNC: 82 MG/DL (ref 68–126)
GFR SERPLBLD BASED ON 1.73 SQ M-ARVRAT: 99 ML/MIN/1.73M2 (ref 60–?)
GLUCOSE BLD-MCNC: 75 MG/DL (ref 70–99)
GLUCOSE BLDC GLUCOMTR-MCNC: 81 MG/DL (ref 70–99)
GLUCOSE BLDC GLUCOMTR-MCNC: 89 MG/DL (ref 70–99)
GLUCOSE BLDC GLUCOMTR-MCNC: 91 MG/DL (ref 70–99)
GLUCOSE BLDC GLUCOMTR-MCNC: 97 MG/DL (ref 70–99)
GLUCOSE UR-MCNC: NEGATIVE MG/DL
HBA1C MFR BLD: 4.5 % (ref ?–5.7)
HCT VFR BLD AUTO: 33.6 %
HGB BLD-MCNC: 10.9 G/DL
HGB UR QL STRIP.AUTO: NEGATIVE
IMM GRANULOCYTES # BLD AUTO: 0.03 X10(3) UL (ref 0–1)
IMM GRANULOCYTES NFR BLD: 0.4 %
LEUKOCYTE ESTERASE UR QL STRIP.AUTO: NEGATIVE
LYMPHOCYTES # BLD AUTO: 2.23 X10(3) UL (ref 1–4)
LYMPHOCYTES NFR BLD AUTO: 31.1 %
MCH RBC QN AUTO: 30.9 PG (ref 26–34)
MCHC RBC AUTO-ENTMCNC: 32.4 G/DL (ref 31–37)
MCV RBC AUTO: 95.2 FL
MDMA UR QL SCN: NEGATIVE
METHADONE UR QL SCN: NEGATIVE
MONOCYTES # BLD AUTO: 0.56 X10(3) UL (ref 0.1–1)
MONOCYTES NFR BLD AUTO: 7.8 %
NEUTROPHILS # BLD AUTO: 4.15 X10 (3) UL (ref 1.5–7.7)
NEUTROPHILS # BLD AUTO: 4.15 X10(3) UL (ref 1.5–7.7)
NEUTROPHILS NFR BLD AUTO: 57.9 %
NITRITE UR QL STRIP.AUTO: NEGATIVE
OPIATES UR QL SCN: NEGATIVE
OSMOLALITY SERPL CALC.SUM OF ELEC: 280 MOSM/KG (ref 275–295)
OXYCODONE UR QL SCN: NEGATIVE
PCP UR QL SCN: NEGATIVE
PH UR: 6 [PH] (ref 5–8)
PLATELET # BLD AUTO: 142 10(3)UL (ref 150–450)
POTASSIUM SERPL-SCNC: 3.1 MMOL/L (ref 3.5–5.1)
POTASSIUM SERPL-SCNC: 3.1 MMOL/L (ref 3.5–5.1)
PROT UR-MCNC: NEGATIVE MG/DL
RBC # BLD AUTO: 3.53 X10(6)UL
SODIUM SERPL-SCNC: 136 MMOL/L (ref 136–145)
SP GR UR STRIP: >1.03 (ref 1–1.03)
UROBILINOGEN UR STRIP-ACNC: 4
VIT C UR-MCNC: NEGATIVE MG/DL
WBC # BLD AUTO: 7.2 X10(3) UL (ref 4–11)

## 2023-01-01 PROCEDURE — 99223 1ST HOSP IP/OBS HIGH 75: CPT | Performed by: OTHER

## 2023-01-01 PROCEDURE — 99233 SBSQ HOSP IP/OBS HIGH 50: CPT | Performed by: HOSPITALIST

## 2023-01-01 RX ORDER — HALOPERIDOL 5 MG/ML
2 INJECTION INTRAMUSCULAR EVERY 6 HOURS PRN
Status: DISCONTINUED | OUTPATIENT
Start: 2023-01-01 | End: 2023-01-02

## 2023-01-01 RX ORDER — SODIUM CHLORIDE AND POTASSIUM CHLORIDE 150; 900 MG/100ML; MG/100ML
INJECTION, SOLUTION INTRAVENOUS CONTINUOUS
Status: DISCONTINUED | OUTPATIENT
Start: 2023-01-01 | End: 2023-01-02

## 2023-01-01 NOTE — ED INITIAL ASSESSMENT (HPI)
Pt to ED via EMS after wife called for patient becoming non verbal after going out to eat. Right sided weakness noted. Pt with hx of cortical atrophy. Per EMS pt blood sugar 114 mg/dl prior to arrival.   Denies fall or head trauma  Pt on baby asa. Last known normal 5 pm per EMS.

## 2023-01-01 NOTE — ED QUICK NOTES
Orders for admission, patient is aware of plan and ready to go upstairs. Any questions, please call ED RN Leti James at extension [phone]. Patient Covid vaccination status: Unvaccinated     COVID Test Ordered in ED: Rapid SARS-CoV-2 by PCR-negative     COVID Suspicion at Admission: Low clinical suspicion for COVID    Running Infusions:  KCL infusing per order    Mental Status/LOC at time of transport: Pt becoming closer to baseline per wife. Pt now answering some questions. Pt with hx of cortical atrophy.      Other pertinent information: Fall risk precautions  Swallowing precautions  CIWA score: N/A   NIH score:  8

## 2023-01-01 NOTE — PLAN OF CARE
Patient A&Ox0 on RA. Q 4 Neuros. Confused and Verbally abusive at times, One time dose for haldol given. Saline locked. On heparin for DVT prophylaxis. Safety precautions maintained, call light and personal belongings within reach. Problem: Patient Centered Care  Goal: Patient preferences are identified and integrated in the patient's plan of care  Description: Interventions:  - What would you like us to know as we care for you?  From home with spouse   - Provide timely, complete, and accurate information to patient/family  - Incorporate patient and family knowledge, values, beliefs, and cultural backgrounds into the planning and delivery of care  - Encourage patient/family to participate in care and decision-making at the level they choose  - Honor patient and family perspectives and choices  Outcome: Progressing     Problem: METABOLIC/FLUID AND ELECTROLYTES - ADULT  Goal: Electrolytes maintained within normal limits  Description: INTERVENTIONS:  - Monitor labs and rhythm and assess patient for signs and symptoms of electrolyte imbalances  - Administer electrolyte replacement as ordered  - Monitor response to electrolyte replacements, including rhythm and repeat lab results as appropriate  - Fluid restriction as ordered  - Instruct patient on fluid and nutrition restrictions as appropriate  Outcome: Progressing     Problem: SKIN/TISSUE INTEGRITY - ADULT  Goal: Skin integrity remains intact  Description: INTERVENTIONS  - Assess and document risk factors for pressure ulcer development  - Assess and document skin integrity  - Monitor for areas of redness and/or skin breakdown  - Initiate interventions, skin care algorithm/standards of care as needed  Outcome: Progressing     Problem: HEMATOLOGIC - ADULT  Goal: Maintains hematologic stability  Description: INTERVENTIONS  - Assess for signs and symptoms of bleeding or hemorrhage  - Monitor labs and vital signs for trends  - Administer supportive blood products/factors, fluids and medications as ordered and appropriate  - Administer supportive blood products/factors as ordered and appropriate  Outcome: Progressing  Goal: Free from bleeding injury  Description: (Example usage: patient with low platelets)  INTERVENTIONS:  - Avoid intramuscular injections, enemas and rectal medication administration  - Ensure safe mobilization of patient  - Hold pressure on venipuncture sites to achieve adequate hemostasis  - Assess for signs and symptoms of internal bleeding  - Monitor lab trends  - Patient is to report abnormal signs of bleeding to staff  - Avoid use of toothpicks and dental floss  - Use electric shaver for shaving  - Use soft bristle tooth brush  - Limit straining and forceful nose blowing  Outcome: Progressing     Problem: MUSCULOSKELETAL - ADULT  Goal: Return mobility to safest level of function  Description: INTERVENTIONS:  - Assess patient stability and activity tolerance for standing, transferring and ambulating w/ or w/o assistive devices  - Assist with transfers and ambulation using safe patient handling equipment as needed  - Ensure adequate protection for wounds/incisions during mobilization  - Obtain PT/OT consults as needed  - Advance activity as appropriate  - Communicate ordered activity level and limitations with patient/family  Outcome: Progressing     Problem: NEUROLOGICAL - ADULT  Goal: Achieves stable or improved neurological status  Description: INTERVENTIONS  - Assess for and report changes in neurological status  - Initiate measures to prevent increased intracranial pressure  - Maintain blood pressure and fluid volume within ordered parameters to optimize cerebral perfusion and minimize risk of hemorrhage  - Monitor temperature, glucose, and sodium.  Initiate appropriate interventions as ordered  Outcome: Progressing  Goal: Absence of seizures  Description: INTERVENTIONS  - Monitor for seizure activity  - Administer anti-seizure medications as ordered  - Monitor neurological status  Outcome: Progressing  Goal: Remains free of injury related to seizure activity  Description: INTERVENTIONS:  - Maintain airway, patient safety  and administer oxygen as ordered  - Monitor patient for seizure activity, document and report duration and description of seizure to MD/LIP  - If seizure occurs, turn patient to side and suction secretions as needed  - Reorient patient post seizure  - Seizure pads on all 4 side rails  - Instruct patient/family to notify RN of any seizure activity  - Instruct patient/family to call for assistance with activity based on assessment  Outcome: Progressing  Goal: Achieves maximal functionality and self care  Description: INTERVENTIONS  - Monitor swallowing and airway patency with patient fatigue and changes in neurological status  - Encourage and assist patient to increase activity and self care with guidance from PT/OT  - Encourage visually impaired, hearing impaired and aphasic patients to use assistive/communication devices  Outcome: Progressing     Problem: Impaired Functional Mobility  Goal: Achieve highest/safest level of mobility/gait  Description: Interventions:  - Assess patient's functional ability and stability  - Promote increasing activity/tolerance for mobility and gait  - Educate and engage patient/family in tolerated activity level and precautions  Outcome: Progressing     Problem: Patient/Family Goals  Goal: Patient/Family Long Term Goal  Description: Patient's Long Term Goal: improve mental status    Interventions:  - neuro checks q 4 h  - monitor vitals  - seizure precautions  - See additional Care Plan goals for specific interventions  Outcome: Progressing  Goal: Patient/Family Short Term Goal  Description: Patient's Short Term Goal: to go home    Interventions:   - monitor vitals  -seizure precautions   -monitor labs  - See additional Care Plan goals for specific interventions  Outcome: Progressing     Problem: SAFETY ADULT - FALL  Goal: Free from fall injury  Description: INTERVENTIONS:  - Assess pt frequently for physical needs  - Identify cognitive and physical deficits and behaviors that affect risk of falls.   - [place] fall precautions as indicated by assessment.  - Educate pt/family on patient safety including physical limitations  - Instruct pt to call for assistance with activity based on assessment  - Modify environment to reduce risk of injury  - Provide assistive devices as appropriate  - Consider OT/PT consult to assist with strengthening/mobility  - Encourage toileting schedule  Outcome: Progressing     Problem: DISCHARGE PLANNING  Goal: Discharge to home or other facility with appropriate resources  Description: INTERVENTIONS:  - Identify barriers to discharge w/pt and caregiver  - Include patient/family/discharge partner in discharge planning  - Arrange for needed discharge resources and transportation as appropriate  - Identify discharge learning needs (meds, wound care, etc)  - Arrange for interpreters to assist at discharge as needed  - Consider post-discharge preferences of patient/family/discharge partner  - Complete POLST form as appropriate  - Assess patient's ability to be responsible for managing their own health  - Refer to Case Management Department for coordinating discharge planning if the patient needs post-hospital services based on physician/LIP order or complex needs related to functional status, cognitive ability or social support system  Outcome: Progressing

## 2023-01-02 VITALS
OXYGEN SATURATION: 98 % | HEIGHT: 72 IN | DIASTOLIC BLOOD PRESSURE: 75 MMHG | BODY MASS INDEX: 30.8 KG/M2 | TEMPERATURE: 97 F | SYSTOLIC BLOOD PRESSURE: 107 MMHG | RESPIRATION RATE: 18 BRPM | WEIGHT: 227.38 LBS | HEART RATE: 76 BPM

## 2023-01-02 PROBLEM — F05 DELIRIUM SUPERIMPOSED ON DEMENTIA: Status: ACTIVE | Noted: 2023-01-02

## 2023-01-02 PROBLEM — F39 EPISODIC MOOD DISORDER: Status: ACTIVE | Noted: 2023-01-02

## 2023-01-02 PROBLEM — F39 EPISODIC MOOD DISORDER (HCC): Status: ACTIVE | Noted: 2023-01-02

## 2023-01-02 LAB
ATRIAL RATE: 92 BPM
C DIFF TOX B STL QL: NEGATIVE
GLUCOSE BLDC GLUCOMTR-MCNC: 80 MG/DL (ref 70–99)
GLUCOSE BLDC GLUCOMTR-MCNC: 84 MG/DL (ref 70–99)
P AXIS: 79 DEGREES
P-R INTERVAL: 170 MS
POTASSIUM SERPL-SCNC: 4.3 MMOL/L (ref 3.5–5.1)
Q-T INTERVAL: 452 MS
QRS DURATION: 88 MS
QTC CALCULATION (BEZET): 558 MS
R AXIS: 18 DEGREES
T AXIS: 58 DEGREES
VENTRICULAR RATE: 92 BPM

## 2023-01-02 PROCEDURE — 90792 PSYCH DIAG EVAL W/MED SRVCS: CPT | Performed by: OTHER

## 2023-01-02 PROCEDURE — 99239 HOSP IP/OBS DSCHRG MGMT >30: CPT | Performed by: HOSPITALIST

## 2023-01-02 PROCEDURE — 99231 SBSQ HOSP IP/OBS SF/LOW 25: CPT | Performed by: OTHER

## 2023-01-02 PROCEDURE — 95816 EEG AWAKE AND DROWSY: CPT | Performed by: OTHER

## 2023-01-02 NOTE — BH PROGRESS NOTE
[facility] provided previously discussed resources for outpatient therapy and psychiatry resources [facility] Part A doesn't cover [address] [facility] services, accepting new patients, and within 10 miles of Rhys's home in his discharge instructions to use along with recommended [facility] which he will discharge with today at 1600. [facility] discussed case with RN who reported that Jeff Duarte has dx alcohol dementia and CVA in 2021, he was a/o x0 and unable to follow commands until today, he thinks he is in [facility] and that it's 2003. He was observed to be agitated with sensory stimulation and is able to follow commands today and is a/o x1-2, per report and chart review he has an outpatient neurologist, is chairbound and a/o x2-3 at baseline. BAL/UDS NEGATIVE    Vanesa HERNANDEZ LPC    Note to patient:  The [facility] makes medical notes like these available to patients in the interest of transparency. However, be advised this is a medical document. It is intended as peer to peer communication. It is written in medical language and may contain abbreviations or verbiage that are unfamiliar. It may appear blunt or direct. Medical documents are intended to carry relevant information, facts as evident, and the clinical opinion of the practitioner.

## 2023-01-02 NOTE — DISCHARGE INSTRUCTIONS
Farooq Rodarte is current with [facility] requested a PT, OT, speech and RN and a start of care fro 1/3/23. [address]  [address] [facility], Daniela [address]  Phone: [phone]  Fax: [phone] [address]  Group - multiple locations  Phone: [phone]    [facility]: [place], [place]  Phone: [phone]    [facility]: [address] [facility], [facility]  Phone: [address] [facility] [address]: [address] [facility], [facility]  Phone: [phone]    [address]: [address] [place] [address]  Phone: [phone]    [address]: [address] [facility]. 3 [facility], [address] [place] [address]  Phone: [phone]    [facility] [address]: [address] [facility], [facility]   Phone: [phone] Multi-Specialty: Yumi Zhang  1560, [facility]  Phone: [phone]    [address]: [address] [place] [address]. [address] [facility], [address] [facility]  Phone: [phone]    [place] - multiple locations: [address] [facility],[address]. [facility], [address]  Phone: [phone]    [address] [place] [address] - multiple locations: [facility] 112. [address] [facility], [address]  Phone: [phone]    [facility]: Faraz Jacob, [address]  Phone: [phone]    [facility] [address]: [address] [facility]  Phone: [phone]    To find more behavioral health providers in network, go to: [email or web address] and search providers by zip code, specialty, accepting new patients and other specifications.

## 2023-01-02 NOTE — CM/SW NOTE
Wife has decided on home with home health versus [facility]. A list of private duty caregivers sent to wife at [email or web address]. CM following up with home health providers. F2F ordered for PT, OT, Speech, RN. CM will follow-up with wife later this afternoon per her request.    [facility] set to take patient home at 4pm today. / to remain available for support and/or discharge planning.      Roman Lennox MBA BSN RN [address]  RN Case Manager  [phone]

## 2023-01-02 NOTE — CM/SW NOTE
Department  notified of request for [facility] referrals started. Assigned CM/SW to follow up with pt/family on further discharge planning.      Franka Forward   January 02, 2023   10:11

## 2023-01-02 NOTE — PLAN OF CARE
No acute neuro changes. Patient remains alert but confused, only oriented x 1-2 person and/or place. Can be agitated at times and screams out for his wife. IVF infusing. Con't VS and neuro q4hrs. Plan for EEG and video swallow in AM. PT/OT recommending [facility] at discharge. Problem: Patient Centered Care  Goal: Patient preferences are identified and integrated in the patient's plan of care  Description: Interventions:  - What would you like us to know as we care for you? Nori Sainz lives at home with his wife Carmen Conroy.  Carmen Conroy is a teacher in [place].  - Provide timely, complete, and accurate information to patient/family  - Incorporate patient and family knowledge, values, beliefs, and cultural backgrounds into the planning and delivery of care  - Encourage patient/family to participate in care and decision-making at the level they choose  - Honor patient and family perspectives and choices  Outcome: Progressing     Problem: METABOLIC/FLUID AND ELECTROLYTES - ADULT  Goal: Electrolytes maintained within normal limits  Description: INTERVENTIONS:  - Monitor labs and rhythm and assess patient for signs and symptoms of electrolyte imbalances  - Administer electrolyte replacement as ordered  - Monitor response to electrolyte replacements, including rhythm and repeat lab results as appropriate  - Fluid restriction as ordered  - Instruct patient on fluid and nutrition restrictions as appropriate  Outcome: Progressing     Problem: SKIN/TISSUE INTEGRITY - ADULT  Goal: Skin integrity remains intact  Description: INTERVENTIONS  - Assess and document risk factors for pressure ulcer development  - Assess and document skin integrity  - Monitor for areas of redness and/or skin breakdown  - Initiate interventions, skin care algorithm/standards of care as needed  Outcome: Progressing     Problem: HEMATOLOGIC - ADULT  Goal: Maintains hematologic stability  Description: INTERVENTIONS  - Assess for signs and symptoms of bleeding or hemorrhage  - Monitor labs and vital signs for trends  - Administer supportive blood products/factors, fluids and medications as ordered and appropriate  - Administer supportive blood products/factors as ordered and appropriate  Outcome: Progressing  Goal: Free from bleeding injury  Description: (Example usage: patient with low platelets)  INTERVENTIONS:  - Avoid intramuscular injections, enemas and rectal medication administration  - Ensure safe mobilization of patient  - Hold pressure on venipuncture sites to achieve adequate hemostasis  - Assess for signs and symptoms of internal bleeding  - Monitor lab trends  - Patient is to report abnormal signs of bleeding to staff  - Avoid use of toothpicks and dental floss  - Use electric shaver for shaving  - Use soft bristle tooth brush  - Limit straining and forceful nose blowing  Outcome: Progressing     Problem: MUSCULOSKELETAL - ADULT  Goal: Return mobility to safest level of function  Description: INTERVENTIONS:  - Assess patient stability and activity tolerance for standing, transferring and ambulating w/ or w/o assistive devices  - Assist with transfers and ambulation using safe patient handling equipment as needed  - Ensure adequate protection for wounds/incisions during mobilization  - Obtain PT/OT consults as needed  - Advance activity as appropriate  - Communicate ordered activity level and limitations with patient/family  Outcome: Progressing     Problem: NEUROLOGICAL - ADULT  Goal: Achieves stable or improved neurological status  Description: INTERVENTIONS  - Assess for and report changes in neurological status  - Initiate measures to prevent increased intracranial pressure  - Maintain blood pressure and fluid volume within ordered parameters to optimize cerebral perfusion and minimize risk of hemorrhage  - Monitor temperature, glucose, and sodium.  Initiate appropriate interventions as ordered  Outcome: Progressing  Goal: Absence of seizures  Description: INTERVENTIONS  - Monitor for seizure activity  - Administer anti-seizure medications as ordered  - Monitor neurological status  Outcome: Progressing  Goal: Remains free of injury related to seizure activity  Description: INTERVENTIONS:  - Maintain airway, patient safety  and administer oxygen as ordered  - Monitor patient for seizure activity, document and report duration and description of seizure to MD/LIP  - If seizure occurs, turn patient to side and suction secretions as needed  - Reorient patient post seizure  - Seizure pads on all 4 side rails  - Instruct patient/family to notify RN of any seizure activity  - Instruct patient/family to call for assistance with activity based on assessment  Outcome: Progressing  Goal: Achieves maximal functionality and self care  Description: INTERVENTIONS  - Monitor swallowing and airway patency with patient fatigue and changes in neurological status  - Encourage and assist patient to increase activity and self care with guidance from PT/OT  - Encourage visually impaired, hearing impaired and aphasic patients to use assistive/communication devices  Outcome: Progressing     Problem: Impaired Functional Mobility  Goal: Achieve highest/safest level of mobility/gait  Description: Interventions:  - Assess patient's functional ability and stability  - Promote increasing activity/tolerance for mobility and gait  - Educate and engage patient/family in tolerated activity level and precautions  - Recommend patient transfer to bedside chair toward strongest side  Outcome: Progressing    Problem: SAFETY ADULT - FALL  Goal: Free from fall injury  Description: INTERVENTIONS:  - Assess pt frequently for physical needs  - Identify cognitive and physical deficits and behaviors that affect risk of falls.   - [place] fall precautions as indicated by assessment.  - Educate pt/family on patient safety including physical limitations  - Instruct pt to call for assistance with activity based on assessment  - Modify environment to reduce risk of injury  - Provide assistive devices as appropriate  - Consider OT/PT consult to assist with strengthening/mobility  - Encourage toileting schedule  Outcome: Progressing     Problem: DISCHARGE PLANNING  Goal: Discharge to home or other facility with appropriate resources  Description: INTERVENTIONS:  - Identify barriers to discharge w/pt and caregiver  - Include patient/family/discharge partner in discharge planning  - Arrange for needed discharge resources and transportation as appropriate  - Identify discharge learning needs (meds, wound care, etc)  - Arrange for interpreters to assist at discharge as needed  - Consider post-discharge preferences of patient/family/discharge partner  - Complete POLST form as appropriate  - Assess patient's ability to be responsible for managing their own health  - Refer to Case Management Department for coordinating discharge planning if the patient needs post-hospital services based on physician/LIP order or complex needs related to functional status, cognitive ability or social support system  Outcome: Progressing

## 2023-01-02 NOTE — CM/SW NOTE
01/02/23 1123   Discharge disposition   Expected discharge disposition Home-Health   Post Acute Care Provider Home  ([facility])   Discharge transportation [facility]       Ambulance to pick patient up at 4pm to return home. Wife is aware of discharge time and plan. Patient is current with [facility]. CM requested a 1/3/23 start of care date. / to remain available for support and/or discharge planning.       Amaya Chance MBA BSN RN [address]  RN Case Manager  [phone]

## 2023-01-02 NOTE — PLAN OF CARE
Pt orientation improved throughout shift. Now A/Ox3- still confused at times but no agitation. Can comply with commands. 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 Seizure activity still possibility- eeg planned. Speech eval- aspiration risk. On mildly thick liquids and chopped/soft diet. Possible video swallow tomorrow. PT/OT recommend CHEO. Psych consulted for aggression and agitation at times. Potassium replaced. U/A and drug screen collected. Seizure precautions in place. Call light within reach and safety precaution in place. Wife updated on care.      Problem: Patient Centered Care  Goal: Patient preferences are identified and integrated in the patient's plan of care  Description: Interventions:  - What would you like us to know as we care for you?   - Provide timely, complete, and accurate information to patient/family  - Incorporate patient and family knowledge, values, beliefs, and cultural backgrounds into the planning and delivery of care  - Encourage patient/family to participate in care and decision-making at the level they choose  - Honor patient and family perspectives and choices  Outcome: Progressing     Problem: METABOLIC/FLUID AND ELECTROLYTES - ADULT  Goal: Electrolytes maintained within normal limits  Description: INTERVENTIONS:  - Monitor labs and rhythm and assess patient for signs and symptoms of electrolyte imbalances  - Administer electrolyte replacement as ordered  - Monitor response to electrolyte replacements, including rhythm and repeat lab results as appropriate  - Fluid restriction as ordered  - Instruct patient on fluid and nutrition restrictions as appropriate  Outcome: Progressing     Problem: SKIN/TISSUE INTEGRITY - ADULT  Goal: Skin integrity remains intact  Description: INTERVENTIONS  - Assess and document risk factors for pressure ulcer development  - Assess and document skin integrity  - Monitor for areas of redness and/or skin breakdown  - Initiate interventions, skin care algorithm/standards of care as needed  Outcome: Progressing     Problem: HEMATOLOGIC - ADULT  Goal: Maintains hematologic stability  Description: INTERVENTIONS  - Assess for signs and symptoms of bleeding or hemorrhage  - Monitor labs and vital signs for trends  - Administer supportive blood products/factors, fluids and medications as ordered and appropriate  - Administer supportive blood products/factors as ordered and appropriate  Outcome: Progressing  Goal: Free from bleeding injury  Description: (Example usage: patient with low platelets)  INTERVENTIONS:  - Avoid intramuscular injections, enemas and rectal medication administration  - Ensure safe mobilization of patient  - Hold pressure on venipuncture sites to achieve adequate hemostasis  - Assess for signs and symptoms of internal bleeding  - Monitor lab trends  - Patient is to report abnormal signs of bleeding to staff  - Avoid use of toothpicks and dental floss  - Use electric shaver for shaving  - Use soft bristle tooth brush  - Limit straining and forceful nose blowing  Outcome: Progressing     Problem: MUSCULOSKELETAL - ADULT  Goal: Return mobility to safest level of function  Description: INTERVENTIONS:  - Assess patient stability and activity tolerance for standing, transferring and ambulating w/ or w/o assistive devices  - Assist with transfers and ambulation using safe patient handling equipment as needed  - Ensure adequate protection for wounds/incisions during mobilization  - Obtain PT/OT consults as needed  - Advance activity as appropriate  - Communicate ordered activity level and limitations with patient/family  Outcome: Progressing     Problem: NEUROLOGICAL - ADULT  Goal: Achieves stable or improved neurological status  Description: INTERVENTIONS  - Assess for and report changes in neurological status  - Initiate measures to prevent increased intracranial pressure  - Maintain blood pressure and fluid volume within ordered parameters to optimize cerebral perfusion and minimize risk of hemorrhage  - Monitor temperature, glucose, and sodium.  Initiate appropriate interventions as ordered  Outcome: Progressing  Goal: Absence of seizures  Description: INTERVENTIONS  - Monitor for seizure activity  - Administer anti-seizure medications as ordered  - Monitor neurological status  Outcome: Progressing  Goal: Remains free of injury related to seizure activity  Description: INTERVENTIONS:  - Maintain airway, patient safety  and administer oxygen as ordered  - Monitor patient for seizure activity, document and report duration and description of seizure to MD/LIP  - If seizure occurs, turn patient to side and suction secretions as needed  - Reorient patient post seizure  - Seizure pads on all 4 side rails  - Instruct patient/family to notify RN of any seizure activity  - Instruct patient/family to call for assistance with activity based on assessment  Outcome: Progressing  Goal: Achieves maximal functionality and self care  Description: INTERVENTIONS  - Monitor swallowing and airway patency with patient fatigue and changes in neurological status  - Encourage and assist patient to increase activity and self care with guidance from PT/OT  - Encourage visually impaired, hearing impaired and aphasic patients to use assistive/communication devices  Outcome: Progressing     Problem: Impaired Functional Mobility  Goal: Achieve highest/safest level of mobility/gait  Description: Interventions:  - Assess patient's functional ability and stability  - Promote increasing activity/tolerance for mobility and gait  - Educate and engage patient/family in tolerated activity level and precautions    Outcome: Progressing     Problem: Patient/Family Goals  Goal: Patient/Family Long Term Goal  Description: Patient's Long Term Goal:    Interventions:    - See additional Care Plan goals for specific interventions  Outcome: Progressing  Goal: Patient/Family Short Term Goal  Description: Patient's Short Term Goal:    Interventions:     - See additional Care Plan goals for specific interventions  Outcome: Progressing     Problem: SAFETY ADULT - FALL  Goal: Free from fall injury  Description: INTERVENTIONS:  - Assess pt frequently for physical needs  - Identify cognitive and physical deficits and behaviors that affect risk of falls.   - [place] fall precautions as indicated by assessment.  - Educate pt/family on patient safety including physical limitations  - Instruct pt to call for assistance with activity based on assessment  - Modify environment to reduce risk of injury  - Provide assistive devices as appropriate  - Consider OT/PT consult to assist with strengthening/mobility  - Encourage toileting schedule  Outcome: Progressing     Problem: DISCHARGE PLANNING  Goal: Discharge to home or other facility with appropriate resources  Description: INTERVENTIONS:  - Identify barriers to discharge w/pt and caregiver  - Include patient/family/discharge partner in discharge planning  - Arrange for needed discharge resources and transportation as appropriate  - Identify discharge learning needs (meds, wound care, etc)  - Arrange for interpreters to assist at discharge as needed  - Consider post-discharge preferences of patient/family/discharge partner  - Complete POLST form as appropriate  - Assess patient's ability to be responsible for managing their own health  - Refer to Case Management Department for coordinating discharge planning if the patient needs post-hospital services based on physician/LIP order or complex needs related to functional status, cognitive ability or social support system  Outcome: Progressing

## 2023-01-02 NOTE — DISCHARGE PLANNING
Patient chart reviewed for discharge: Medication Reconciliation completed, Specialist/PCP follow up listed, and disease specific Instructions/Education included in After Visit Summary. Discharge RN notified patient's RN of AVS completion/pinted AVS, facesheet and PCS form for [facility].  4pm per CM. Patient's RN to notify DC RN if discharge status changes.         [place] RN, Discharge Leader [id number]

## 2023-01-25 ENCOUNTER — HOSPITAL ENCOUNTER (EMERGENCY)
Age: 63
Discharge: HOME | End: 2023-01-25
Attending: EMERGENCY MEDICINE

## 2023-01-25 VITALS
SYSTOLIC BLOOD PRESSURE: 108 MMHG | HEART RATE: 62 BPM | DIASTOLIC BLOOD PRESSURE: 64 MMHG | WEIGHT: 226.19 LBS | OXYGEN SATURATION: 99 % | RESPIRATION RATE: 18 BRPM | TEMPERATURE: 97.6 F

## 2023-01-25 DIAGNOSIS — R55 VASOVAGAL SYNCOPE: Primary | ICD-10-CM

## 2023-01-25 LAB
ALBUMIN SERPL-MCNC: 2 G/DL (ref 3.6–5.1)
ALP SERPL-CCNC: 106 UNITS/L (ref 45–117)
ALT SERPL-CCNC: 18 UNITS/L
ANION GAP SERPL CALC-SCNC: 13 MMOL/L (ref 7–19)
AST SERPL-CCNC: 32 UNITS/L
BASOPHILS # BLD: 0.1 K/MCL (ref 0–0.3)
BASOPHILS NFR BLD: 1 %
BILIRUB CONJ SERPL-MCNC: 0.4 MG/DL (ref 0–0.2)
BILIRUB SERPL-MCNC: 0.9 MG/DL (ref 0.2–1)
BUN SERPL-MCNC: 15 MG/DL (ref 6–20)
BUN/CREAT SERPL: 14 (ref 7–25)
CALCIUM SERPL-MCNC: 8 MG/DL (ref 8.4–10.2)
CHLORIDE SERPL-SCNC: 102 MMOL/L (ref 97–110)
CO2 SERPL-SCNC: 26 MMOL/L (ref 21–32)
CREAT SERPL-MCNC: 1.09 MG/DL (ref 0.67–1.17)
DEPRECATED RDW RBC: 47.5 FL (ref 39–50)
EOSINOPHIL # BLD: 0.2 K/MCL (ref 0–0.5)
EOSINOPHIL NFR BLD: 2 %
ERYTHROCYTE [DISTWIDTH] IN BLOOD: 13.5 % (ref 11–15)
ETHANOL SERPL-MCNC: NORMAL MG/DL
FASTING DURATION TIME PATIENT: ABNORMAL H
GFR SERPLBLD BASED ON 1.73 SQ M-ARVRAT: 78 ML/MIN
GLUCOSE SERPL-MCNC: 101 MG/DL (ref 70–99)
HCT VFR BLD CALC: 36.3 % (ref 39–51)
HGB BLD-MCNC: 11.7 G/DL (ref 13–17)
IMM GRANULOCYTES # BLD AUTO: 0 K/MCL (ref 0–0.2)
IMM GRANULOCYTES # BLD: 0 %
LYMPHOCYTES # BLD: 4.8 K/MCL (ref 1–4)
LYMPHOCYTES NFR BLD: 42 %
MAGNESIUM SERPL-MCNC: 1.7 MG/DL (ref 1.7–2.4)
MCH RBC QN AUTO: 30.5 PG (ref 26–34)
MCHC RBC AUTO-ENTMCNC: 32.2 G/DL (ref 32–36.5)
MCV RBC AUTO: 94.8 FL (ref 78–100)
MONOCYTES # BLD: 0.8 K/MCL (ref 0.3–0.9)
MONOCYTES NFR BLD: 7 %
NEUTROPHILS # BLD: 5.4 K/MCL (ref 1.8–7.7)
NEUTROPHILS NFR BLD: 48 %
NRBC BLD MANUAL-RTO: 0 /100 WBC
PLATELET # BLD AUTO: 205 K/MCL (ref 140–450)
POTASSIUM SERPL-SCNC: 3.7 MMOL/L (ref 3.4–5.1)
PROT SERPL-MCNC: 5.7 G/DL (ref 6.4–8.2)
RBC # BLD: 3.83 MIL/MCL (ref 4.5–5.9)
SODIUM SERPL-SCNC: 137 MMOL/L (ref 135–145)
TROPONIN I SERPL DL<=0.01 NG/ML-MCNC: 10 NG/L
WBC # BLD: 11.3 K/MCL (ref 4.2–11)

## 2023-01-25 PROCEDURE — 93005 ELECTROCARDIOGRAM TRACING: CPT | Performed by: EMERGENCY MEDICINE

## 2023-01-25 PROCEDURE — 85025 COMPLETE CBC W/AUTO DIFF WBC: CPT | Performed by: EMERGENCY MEDICINE

## 2023-01-25 PROCEDURE — 99283 EMERGENCY DEPT VISIT LOW MDM: CPT

## 2023-01-25 PROCEDURE — 83735 ASSAY OF MAGNESIUM: CPT | Performed by: EMERGENCY MEDICINE

## 2023-01-25 PROCEDURE — 82077 ASSAY SPEC XCP UR&BREATH IA: CPT | Performed by: EMERGENCY MEDICINE

## 2023-01-25 PROCEDURE — 10002807 HB RX 258: Performed by: EMERGENCY MEDICINE

## 2023-01-25 PROCEDURE — 80048 BASIC METABOLIC PNL TOTAL CA: CPT | Performed by: EMERGENCY MEDICINE

## 2023-01-25 PROCEDURE — 84484 ASSAY OF TROPONIN QUANT: CPT | Performed by: EMERGENCY MEDICINE

## 2023-01-25 PROCEDURE — 96360 HYDRATION IV INFUSION INIT: CPT

## 2023-01-25 PROCEDURE — 80076 HEPATIC FUNCTION PANEL: CPT | Performed by: EMERGENCY MEDICINE

## 2023-01-25 RX ADMIN — SODIUM CHLORIDE 1000 ML: 9 INJECTION, SOLUTION INTRAVENOUS at 15:23

## 2023-01-26 LAB
ATRIAL RATE (BPM): 60
P AXIS (DEGREES): 62
PR-INTERVAL (MSEC): 174
QRS-INTERVAL (MSEC): 94
QT-INTERVAL (MSEC): 510
QTC: 510
R AXIS (DEGREES): -6
RAINBOW EXTRA TUBES HOLD SPECIMEN: NORMAL
RAINBOW EXTRA TUBES HOLD SPECIMEN: NORMAL
REPORT TEXT: NORMAL
T AXIS (DEGREES): 10
VENTRICULAR RATE EKG/MIN (BPM): 60

## 2023-06-05 ENCOUNTER — APPOINTMENT (OUTPATIENT)
Dept: CT IMAGING | Facility: HOSPITAL | Age: 63
End: 2023-06-05
Attending: EMERGENCY MEDICINE
Payer: COMMERCIAL

## 2023-06-05 ENCOUNTER — APPOINTMENT (OUTPATIENT)
Dept: GENERAL RADIOLOGY | Facility: HOSPITAL | Age: 63
End: 2023-06-05
Attending: EMERGENCY MEDICINE
Payer: COMMERCIAL

## 2023-06-05 ENCOUNTER — APPOINTMENT (OUTPATIENT)
Dept: GENERAL RADIOLOGY | Facility: HOSPITAL | Age: 63
End: 2023-06-05
Payer: COMMERCIAL

## 2023-06-05 PROBLEM — K70.31 ASCITES DUE TO ALCOHOLIC CIRRHOSIS (HCC): Status: ACTIVE | Noted: 2023-06-05

## 2023-06-05 PROBLEM — R09.02 HYPOXIA: Status: ACTIVE | Noted: 2023-06-05

## 2023-06-05 PROBLEM — I48.91 ATRIAL FIBRILLATION AND FLUTTER (HCC): Status: ACTIVE | Noted: 2023-06-05

## 2023-06-05 PROBLEM — I48.92 ATRIAL FIBRILLATION AND FLUTTER (HCC): Status: ACTIVE | Noted: 2023-06-05

## 2023-06-05 PROCEDURE — 74177 CT ABD & PELVIS W/CONTRAST: CPT | Performed by: EMERGENCY MEDICINE

## 2023-06-05 PROCEDURE — 71045 X-RAY EXAM CHEST 1 VIEW: CPT | Performed by: EMERGENCY MEDICINE

## 2023-06-06 ENCOUNTER — APPOINTMENT (OUTPATIENT)
Dept: CV DIAGNOSTICS | Facility: HOSPITAL | Age: 63
End: 2023-06-06
Attending: INTERNAL MEDICINE
Payer: COMMERCIAL

## 2023-06-06 PROCEDURE — 93306 TTE W/DOPPLER COMPLETE: CPT | Performed by: INTERNAL MEDICINE

## 2023-06-09 ENCOUNTER — APPOINTMENT (OUTPATIENT)
Dept: ULTRASOUND IMAGING | Facility: HOSPITAL | Age: 63
End: 2023-06-09
Attending: HOSPITALIST
Payer: COMMERCIAL

## 2023-06-09 PROCEDURE — 49083 ABD PARACENTESIS W/IMAGING: CPT | Performed by: HOSPITALIST

## 2023-07-05 ENCOUNTER — TELEPHONE (OUTPATIENT)
Facility: CLINIC | Age: 63
End: 2023-07-05

## 2023-07-05 NOTE — TELEPHONE ENCOUNTER
Dr. Lois Reyes spoke to wife Seymour Nieves (patient gave our office verbal permission to speak to her about his medical care)  Patient was seen by you when inpatient back on 6/5/23 and he was discharged on 6/9/23. He has an appointment to see you on 7/24/23. Per Seymour Nieves, his abdomen has grown 2 inches since he was discharged on 6/9/23. His abdomen has gotten harder. He had a stomach yesterday that went away in the evening after moving his bowels. Denies any abdominal pain or shortness of breath at the time of the call. She wanted to now if he needs paracentesis between now and his appointment or if he should be seen in office sooner since he has grown 2\".     Please advise    Thank you

## 2023-07-05 NOTE — TELEPHONE ENCOUNTER
Pts wife states that pt has ascites and his abdomen is getting bigger (2 inches). Should he have another paracentisis? She states pt saw Dr. Portillo Oro when he was in [address] [place] [address].    Please lucy.

## 2023-07-06 NOTE — TELEPHONE ENCOUNTER
Follow up appointment opened by Moody Anton and appointment made for 07/10/2023 at 11:30 am with Dr. Derek Landa. Request for recent lab results faxed to Dr. Lauren Peña at [phone] with return confirmation. Awaiting fax.

## 2023-07-06 NOTE — TELEPHONE ENCOUNTER
I spoke to Grant. She feels that he may be accumulating some fluid. His abdomen is not tense. His legs are not swollen. He is taking 40 mg daily of furosemide and 25 mg daily of spironolactone. He had laboratory testing recently performed by his cardiologist, Dr. Juan Holt [phone]. I would like to move the patient's appointment up to Monday, July 10 at 11:30 AM.    GI RNs: Please add the patient onto the schedule. They are aware. Can we contact his cardiologist's office about faxing the most recent lab results to us. Never smoker

## 2023-07-07 NOTE — TELEPHONE ENCOUNTER
Dr Dragan Muller    Received faxed lab report from Dr Major Leija office. Placed on your desk for review.     OV scheduled on 7/10/2023

## 2023-07-07 NOTE — TELEPHONE ENCOUNTER
Labs sent for scanning. Summary of labs as below.     WBC: 11.8  Hgb: 10.0  HCT: 35.3  MCV 73  Platelets: 842,509    Sodium 132  Potassium 4.1  Chloride: 98  CO2: 26  BUN: 13  Creatinine: 1.11  Albumin: 2.9  Alkaline phosphatase: 140  AST: 19  ALT: 9

## 2023-07-07 NOTE — TELEPHONE ENCOUNTER
I spoke to Grant (spouse) not on JACQUELINE. I asked Grant to have the patient call the office. Please ask the patient if we can cancel appt on 7/24/2023. He is scheduled to see MD on 7/10/2023.

## 2023-07-10 ENCOUNTER — TELEPHONE (OUTPATIENT)
Facility: CLINIC | Age: 63
End: 2023-07-10

## 2023-07-10 ENCOUNTER — HOSPITAL ENCOUNTER (OUTPATIENT)
Dept: ULTRASOUND IMAGING | Facility: HOSPITAL | Age: 63
Discharge: HOME | End: 2023-07-10
Attending: INTERNAL MEDICINE
Payer: COMMERCIAL

## 2023-07-10 VITALS — BODY MASS INDEX: 30.61 KG/M2 | HEIGHT: 72 IN | WEIGHT: 226 LBS

## 2023-07-10 DIAGNOSIS — K70.31 ASCITES DUE TO ALCOHOLIC CIRRHOSIS (HCC): ICD-10-CM

## 2023-07-10 LAB
BASOPHILS NFR PRT: 0 %
COLOR FLD: YELLOW
DEPRECATED RDW RBC AUTO: 45.1 FL (ref 35.1–46.3)
EOSINOPHIL NFR PRT: 0 %
ERYTHROCYTE [DISTWIDTH] IN BLOOD BY AUTOMATED COUNT: 18.5 % (ref 11–15)
HCT VFR BLD AUTO: 36.7 %
HGB BLD-MCNC: 10.7 G/DL
INR BLD: 1.13 (ref 0.85–1.16)
LYMPHOCYTES NFR PRT: 81 %
MCH RBC QN AUTO: 20.4 PG (ref 26–34)
MCHC RBC AUTO-ENTMCNC: 29.2 G/DL (ref 31–37)
MCV RBC AUTO: 70 FL
MESOTHL CELL NFR PRT: 2 %
MONOS+MACROS NFR PRT: 13 %
NEUTROPHILS NFR FLD: 4 %
PLATELET # BLD AUTO: 429 10(3)UL (ref 150–450)
PROTHROMBIN TIME: 14.4 SECONDS (ref 11.6–14.8)
RBC # BLD AUTO: 5.24 X10(6)UL
RBC # FLD: 86 /CUMM (ref ?–1)
TOTAL CELLS COUNTED FLD: 100
TOTAL CELLS COUNTED PRT: 121 /CUMM (ref ?–1)
TURBIDITY CSF QL: CLEAR
WBC # BLD AUTO: 14.7 X10(3) UL (ref 4–11)
WBC # PRT: 119 /CUMM

## 2023-07-10 PROCEDURE — 49083 ABD PARACENTESIS W/IMAGING: CPT | Performed by: INTERNAL MEDICINE

## 2023-07-10 PROCEDURE — 36415 COLL VENOUS BLD VENIPUNCTURE: CPT | Performed by: CLINICAL NURSE SPECIALIST

## 2023-07-10 PROCEDURE — 85027 COMPLETE CBC AUTOMATED: CPT | Performed by: CLINICAL NURSE SPECIALIST

## 2023-07-10 PROCEDURE — 89050 BODY FLUID CELL COUNT: CPT

## 2023-07-10 PROCEDURE — 89051 BODY FLUID CELL COUNT: CPT

## 2023-07-10 PROCEDURE — 85610 PROTHROMBIN TIME: CPT | Performed by: CLINICAL NURSE SPECIALIST

## 2023-07-10 PROCEDURE — 87070 CULTURE OTHR SPECIMN AEROBIC: CPT

## 2023-07-10 PROCEDURE — 87205 SMEAR GRAM STAIN: CPT

## 2023-07-10 PROCEDURE — P9047 ALBUMIN (HUMAN), 25%, 50ML: HCPCS

## 2023-07-10 RX ORDER — ALBUMIN (HUMAN) 12.5 G/50ML
SOLUTION INTRAVENOUS
Status: DISCONTINUED
Start: 2023-07-10 | End: 2023-07-11

## 2023-07-10 RX ORDER — ALBUMIN (HUMAN) 12.5 G/50ML
200 SOLUTION INTRAVENOUS ONCE
Status: COMPLETED | OUTPATIENT
Start: 2023-07-10 | End: 2023-07-10

## 2023-07-10 RX ADMIN — ALBUMIN (HUMAN) 200 ML: 12.5 SOLUTION INTRAVENOUS at 15:35:00

## 2023-07-10 RX ADMIN — ALBUMIN (HUMAN) 200 ML: 12.5 SOLUTION INTRAVENOUS at 14:25:00

## 2023-07-10 NOTE — TELEPHONE ENCOUNTER
Dr Nissa Armstrong spoke to the patient's spouse, Jose Angel Ahumada (on JACQUELINE), patient's /name verified. The patient is in ultrasound dept. I informed Jose Angel Ahumada to disregard order for bowel prep if she receives a notification from his pharmacy. I called [facility], spoke to [employer] [facility] he canceled order for colyte.     Thank you

## 2023-07-10 NOTE — IMAGING NOTE
1330 Pt to ultrasound room scouts taken by A ARNOLD RT    1332 /96 HR 85 O2 %    Hx taken procedure explained questions answered PREVIOUS TX 6/5/23 REMOVED 6200 ML     1338 Patient consented. Pt to get albumin 25% 25 grams yes or no     1345 [address] SAMU[address] AMARO  Here to access- scanning completed and reviewed. PLATELETS =  833    PT= 14.4     INR= 1.13    1348 Timeout taken    1351 Chloro prep  as skin prep sterile drape applied lidocaine 1% 10 milligrams per ml from kit was given for anesthetic affect 5 ml total given. Incision made with scalpel. 5 [facility]  10 Tamazight 10 cm Ideaxiseh catheter placed RIGHT LOWER abdomen    Fluid aspirated for labs  YES - CELL COUNT AND CULTURES    (IF CYTOLOGY FLUID NEEDED --COLLECT 1 LITER OF FLUID IN VACUUM BOTTLE) PER PATHOLOGY    1355 Catheter connected  to tubing then connected to [employer] to [facility]. Draining begins continuously via  [place] [facility] System. /89 HR 94 PO2 SAT 98%    1410 /81 HR 90 3431 ML REMOVED    1425 ALBUMIN #1 STARTED THRU ESTABLISHED IV SITE    1428 7046 ML REMOVED /87 HR 86 O2 SAT 99%    1435 9057 ML REMOVED. HR 87 /85 O2 %    1500 PROCEDURE COMPLETED-96334 HR 83 /76 O2 %    TRANSFER TO RAD HOLDING- ALBUMIN #2 STARTED    1500  Draining completed. Patient re scanned. total amount drained 22659 ml. Drain was dc'd. Pressure to site for 5 minutes. Area was cleaned WITH DURABOND, steri strips , GUAZE AND TEGADERM DRESSING to site. 1535 albumin #3 started, holding area    Next Tx 7/24 @ 1300      Post instructions was given verbally and written  after visit summary sheet provided to patient.     1605 ALBUMIN #4 STARTED    Discharged    1500  [employer] system was cleaned after procedure by Sen William RN

## 2023-07-14 ENCOUNTER — TELEPHONE (OUTPATIENT)
Facility: CLINIC | Age: 63
End: 2023-07-14

## 2023-07-14 NOTE — TELEPHONE ENCOUNTER
I spoke to the patient's wife,Temi. Patient's /name verified. I relayed MD message. She verbalized understanding of the info given.

## 2023-07-14 NOTE — TELEPHONE ENCOUNTER
GI RNs: Can you contact the patient's wife and let them know that [facility] will be giving them a call next week to arrange an outpatient appointment with one of the liver doctors. Please have Jackeline Briones let us know if they do not call. No

## 2023-07-21 ENCOUNTER — APPOINTMENT (OUTPATIENT)
Dept: GENERAL RADIOLOGY | Facility: HOSPITAL | Age: 63
DRG: 432 | End: 2023-07-21
Attending: EMERGENCY MEDICINE
Payer: COMMERCIAL

## 2023-07-21 ENCOUNTER — HOSPITAL ENCOUNTER (INPATIENT)
Facility: HOSPITAL | Age: 63
LOS: 1 days | Discharge: HOME | End: 2023-07-22
Attending: EMERGENCY MEDICINE | Admitting: HOSPITALIST
Payer: COMMERCIAL

## 2023-07-21 ENCOUNTER — TELEPHONE (OUTPATIENT)
Facility: CLINIC | Age: 63
End: 2023-07-21

## 2023-07-21 ENCOUNTER — HOSPITAL ENCOUNTER (INPATIENT)
Facility: HOSPITAL | Age: 63
LOS: 1 days | Discharge: HOME | DRG: 432 | End: 2023-07-22
Attending: EMERGENCY MEDICINE | Admitting: HOSPITALIST
Payer: COMMERCIAL

## 2023-07-21 ENCOUNTER — APPOINTMENT (OUTPATIENT)
Dept: CT IMAGING | Facility: HOSPITAL | Age: 63
DRG: 432 | End: 2023-07-21
Attending: EMERGENCY MEDICINE
Payer: COMMERCIAL

## 2023-07-21 ENCOUNTER — APPOINTMENT (OUTPATIENT)
Dept: ULTRASOUND IMAGING | Facility: HOSPITAL | Age: 63
End: 2023-07-21
Attending: INTERNAL MEDICINE
Payer: COMMERCIAL

## 2023-07-21 ENCOUNTER — APPOINTMENT (OUTPATIENT)
Dept: ULTRASOUND IMAGING | Facility: HOSPITAL | Age: 63
DRG: 432 | End: 2023-07-21
Attending: INTERNAL MEDICINE
Payer: COMMERCIAL

## 2023-07-21 ENCOUNTER — APPOINTMENT (OUTPATIENT)
Dept: GENERAL RADIOLOGY | Facility: HOSPITAL | Age: 63
End: 2023-07-21
Attending: EMERGENCY MEDICINE
Payer: COMMERCIAL

## 2023-07-21 ENCOUNTER — APPOINTMENT (OUTPATIENT)
Dept: CT IMAGING | Facility: HOSPITAL | Age: 63
End: 2023-07-21
Attending: EMERGENCY MEDICINE
Payer: COMMERCIAL

## 2023-07-21 DIAGNOSIS — R14.0 ABDOMINAL DISTENTION: Primary | ICD-10-CM

## 2023-07-21 PROBLEM — D72.829 LEUKOCYTOSIS: Status: ACTIVE | Noted: 2023-07-21

## 2023-07-21 PROBLEM — D64.9 ANEMIA: Status: ACTIVE | Noted: 2023-07-21

## 2023-07-21 PROBLEM — N17.9 ACUTE KIDNEY INJURY (HCC): Status: ACTIVE | Noted: 2023-07-21

## 2023-07-21 PROBLEM — N17.9 ACUTE KIDNEY INJURY: Status: ACTIVE | Noted: 2023-07-21

## 2023-07-21 LAB
ALBUMIN FLD-MCNC: 1.1 G/DL
ALBUMIN SERPL-MCNC: 2.3 G/DL (ref 3.4–5)
ALP LIVER SERPL-CCNC: 120 U/L
ALT SERPL-CCNC: 12 U/L
ANION GAP SERPL CALC-SCNC: 10 MMOL/L (ref 0–18)
AST SERPL-CCNC: 27 U/L (ref 15–37)
BASOPHILS # BLD AUTO: 0.16 X10(3) UL (ref 0–0.2)
BASOPHILS NFR BLD AUTO: 0.5 %
BASOPHILS NFR PRT: 1 %
BILIRUB DIRECT SERPL-MCNC: 0.4 MG/DL (ref 0–0.2)
BILIRUB SERPL-MCNC: 1.1 MG/DL (ref 0.1–2)
BILIRUB UR QL: NEGATIVE
BUN BLD-MCNC: 17 MG/DL (ref 7–18)
BUN/CREAT SERPL: 10.2 (ref 10–20)
CALCIUM BLD-MCNC: 8.6 MG/DL (ref 8.5–10.1)
CHLORIDE SERPL-SCNC: 102 MMOL/L (ref 98–112)
CLARITY UR: CLEAR
CO2 SERPL-SCNC: 24 MMOL/L (ref 21–32)
COLOR FLD: YELLOW
COLOR UR: YELLOW
CREAT BLD-MCNC: 1.66 MG/DL
DEPRECATED RDW RBC AUTO: 45.1 FL (ref 35.1–46.3)
EGFRCR SERPLBLD CKD-EPI 2021: 47 ML/MIN/1.73M2 (ref 60–?)
EOSINOPHIL # BLD AUTO: 0.01 X10(3) UL (ref 0–0.7)
EOSINOPHIL NFR BLD AUTO: 0 %
EOSINOPHIL NFR PRT: 0 %
ERYTHROCYTE [DISTWIDTH] IN BLOOD BY AUTOMATED COUNT: 18.6 % (ref 11–15)
GLUCOSE BLD-MCNC: 120 MG/DL (ref 70–99)
GLUCOSE BLDC GLUCOMTR-MCNC: 103 MG/DL (ref 70–99)
GLUCOSE UR-MCNC: NORMAL MG/DL
HCT VFR BLD AUTO: 34.4 %
HGB BLD-MCNC: 10 G/DL
HGB UR QL STRIP.AUTO: NEGATIVE
HYALINE CASTS #/AREA URNS AUTO: PRESENT /LPF
IMM GRANULOCYTES # BLD AUTO: 0.25 X10(3) UL (ref 0–1)
IMM GRANULOCYTES NFR BLD: 0.8 %
INR BLD: 1.23 (ref 0.85–1.16)
KETONES UR-MCNC: NEGATIVE MG/DL
LACTATE SERPL-SCNC: 1.5 MMOL/L (ref 0.4–2)
LACTATE SERPL-SCNC: 2.8 MMOL/L (ref 0.4–2)
LEUKOCYTE ESTERASE UR QL STRIP.AUTO: NEGATIVE
LYMPHOCYTES # BLD AUTO: 1.91 X10(3) UL (ref 1–4)
LYMPHOCYTES NFR BLD AUTO: 6.1 %
LYMPHOCYTES NFR PRT: 62 %
MCH RBC QN AUTO: 20.2 PG (ref 26–34)
MCHC RBC AUTO-ENTMCNC: 29.1 G/DL (ref 31–37)
MCV RBC AUTO: 69.4 FL
MONOCYTES # BLD AUTO: 1.54 X10(3) UL (ref 0.1–1)
MONOCYTES NFR BLD AUTO: 5 %
MONOS+MACROS NFR PRT: 23 %
NEUTROPHILS # BLD AUTO: 27.21 X10 (3) UL (ref 1.5–7.7)
NEUTROPHILS # BLD AUTO: 27.21 X10(3) UL (ref 1.5–7.7)
NEUTROPHILS NFR BLD AUTO: 87.6 %
NEUTROPHILS NFR FLD: 14 %
NITRITE UR QL STRIP.AUTO: NEGATIVE
OSMOLALITY SERPL CALC.SUM OF ELEC: 285 MOSM/KG (ref 275–295)
PH UR: 5 [PH] (ref 5–8)
PLATELET # BLD AUTO: 463 10(3)UL (ref 150–450)
POTASSIUM SERPL-SCNC: 4.4 MMOL/L (ref 3.5–5.1)
PROCALCITONIN SERPL-MCNC: 6.18 NG/ML (ref ?–0.16)
PROT PRT-MCNC: 2.4 G/DL
PROT SERPL-MCNC: 6.5 G/DL (ref 6.4–8.2)
PROT UR-MCNC: 20 MG/DL
PROTHROMBIN TIME: 15.4 SECONDS (ref 11.6–14.8)
RBC # BLD AUTO: 4.96 X10(6)UL
RBC # FLD: 78 /CUMM (ref ?–1)
SODIUM SERPL-SCNC: 136 MMOL/L (ref 136–145)
SP GR UR STRIP: 1.02 (ref 1–1.03)
TOTAL CELLS COUNTED FLD: 100
TOTAL CELLS COUNTED PRT: 216 /CUMM (ref ?–1)
TURBIDITY CSF QL: CLEAR
UROBILINOGEN UR STRIP-ACNC: NORMAL
WBC # BLD AUTO: 31.1 X10(3) UL (ref 4–11)
WBC # PRT: 216 /CUMM

## 2023-07-21 PROCEDURE — 0W9G3ZZ DRAINAGE OF PERITONEAL CAVITY, PERCUTANEOUS APPROACH: ICD-10-PCS | Performed by: NURSE PRACTITIONER

## 2023-07-21 PROCEDURE — 71045 X-RAY EXAM CHEST 1 VIEW: CPT | Performed by: EMERGENCY MEDICINE

## 2023-07-21 PROCEDURE — 99254 IP/OBS CNSLTJ NEW/EST MOD 60: CPT | Performed by: NURSE PRACTITIONER

## 2023-07-21 PROCEDURE — 49083 ABD PARACENTESIS W/IMAGING: CPT | Performed by: INTERNAL MEDICINE

## 2023-07-21 PROCEDURE — 99223 1ST HOSP IP/OBS HIGH 75: CPT | Performed by: HOSPITALIST

## 2023-07-21 PROCEDURE — 74177 CT ABD & PELVIS W/CONTRAST: CPT | Performed by: EMERGENCY MEDICINE

## 2023-07-21 RX ORDER — ONDANSETRON 2 MG/ML
4 INJECTION INTRAMUSCULAR; INTRAVENOUS EVERY 6 HOURS PRN
Status: DISCONTINUED | OUTPATIENT
Start: 2023-07-21 | End: 2023-07-22

## 2023-07-21 RX ORDER — ATORVASTATIN CALCIUM 40 MG/1
40 TABLET, FILM COATED ORAL NIGHTLY
Status: DISCONTINUED | OUTPATIENT
Start: 2023-07-21 | End: 2023-07-22

## 2023-07-21 RX ORDER — ONDANSETRON 2 MG/ML
4 INJECTION INTRAMUSCULAR; INTRAVENOUS ONCE
Status: COMPLETED | OUTPATIENT
Start: 2023-07-21 | End: 2023-07-21

## 2023-07-21 RX ORDER — TEMAZEPAM 15 MG/1
15 CAPSULE ORAL NIGHTLY PRN
Status: DISCONTINUED | OUTPATIENT
Start: 2023-07-21 | End: 2023-07-22

## 2023-07-21 RX ORDER — MAGNESIUM OXIDE 400 MG/1
400 TABLET ORAL 2 TIMES DAILY
Status: DISCONTINUED | OUTPATIENT
Start: 2023-07-21 | End: 2023-07-22

## 2023-07-21 RX ORDER — GABAPENTIN 300 MG/1
300 CAPSULE ORAL 2 TIMES DAILY
Status: DISCONTINUED | OUTPATIENT
Start: 2023-07-21 | End: 2023-07-22

## 2023-07-21 RX ORDER — SUCRALFATE ORAL 1 G/10ML
1 SUSPENSION ORAL
Status: DISCONTINUED | OUTPATIENT
Start: 2023-07-22 | End: 2023-07-22

## 2023-07-21 RX ORDER — ALBUMIN (HUMAN) 12.5 G/50ML
SOLUTION INTRAVENOUS
Status: COMPLETED
Start: 2023-07-21 | End: 2023-07-21

## 2023-07-21 RX ORDER — ALBUMIN (HUMAN) 12.5 G/50ML
100 SOLUTION INTRAVENOUS AS NEEDED
Status: DISCONTINUED | OUTPATIENT
Start: 2023-07-21 | End: 2023-07-22

## 2023-07-21 RX ORDER — MIDODRINE HYDROCHLORIDE 5 MG/1
10 TABLET ORAL 3 TIMES DAILY
Status: DISCONTINUED | OUTPATIENT
Start: 2023-07-21 | End: 2023-07-22

## 2023-07-21 RX ORDER — ACETAMINOPHEN 500 MG
500 TABLET ORAL EVERY 4 HOURS PRN
Status: DISCONTINUED | OUTPATIENT
Start: 2023-07-21 | End: 2023-07-22

## 2023-07-21 RX ORDER — HEPARIN SODIUM 5000 [USP'U]/ML
5000 INJECTION, SOLUTION INTRAVENOUS; SUBCUTANEOUS EVERY 12 HOURS SCHEDULED
Status: DISCONTINUED | OUTPATIENT
Start: 2023-07-21 | End: 2023-07-22

## 2023-07-21 RX ORDER — PROCHLORPERAZINE EDISYLATE 5 MG/ML
5 INJECTION INTRAMUSCULAR; INTRAVENOUS EVERY 8 HOURS PRN
Status: DISCONTINUED | OUTPATIENT
Start: 2023-07-21 | End: 2023-07-22

## 2023-07-21 NOTE — TELEPHONE ENCOUNTER
Dr Deonna Bearden    I received another call the patient's spouse, Beulah Alcazar. She stated the patient started vomiting after we spoke this morning. He could not keep anything down even water and medication. I advised Beulah Alcazar to take the patient to ED for evaluation which she agreed.     Thank you

## 2023-07-21 NOTE — H&P
[facility]    PATIENT'S NAME: Searcy Phalen   ATTENDING PHYSICIAN: Jus Dsouza MD   PATIENT ACCOUNT#:   [de-identified]    LOCATION:  [place] [address]  MEDICAL RECORD #:   [id number]       YOB: 1960  ADMISSION DATE:       07/21/2023    HISTORY AND PHYSICAL EXAMINATION    CHIEF COMPLAINT:  Large ascites, nausea, vomiting, and possible aspiration pneumonia. HISTORY OF PRESENT ILLNESS:  Patient is a 28-year-old  male with underlying alcoholic liver cirrhosis who was hospitalized in June and had paracentesis. During his hospitalization, he was treated for possible aspiration pneumonia as well. He received his last paracentesis on July 10, and he is scheduled for another paracentesis on Monday, 3 days from now. Patient was brought in today for increased abdominal girth associated with nausea, vomiting, and inability to tolerate oral intake. CBC today showed white blood cell count of 31.1 with left shift, hemoglobin stable at 10.0, platelet count 889 indicative of acute inflammatory reaction. Liver function tests, AST and ALT 27 and 12, alkaline phosphatase 120, and total bilirubin 1.1. GFR is below his baseline at 47. Urinalysis showed no clear evidence of urinary tract infection. Lactic acid and blood cultures were obtained. Procalcitonin was ordered. Patient will be taken by Interventional Radiology for repeat paracentesis today. PAST MEDICAL HISTORY:  Alcoholism with alcoholic liver cirrhosis, recurrent ascites requiring paracentesis every 2 weeks. He had recent aspiration pneumonia. He had paroxysmal atrial fibrillation, anticoagulated with Eliquis. Hypertension, cerebrovascular accident, hyperlipidemia, severe peripheral neuropathy, and benign prostate hypertrophy. PAST SURGICAL HISTORY:  Right thoracotomy and pleurodesis after empyema.      MEDICATIONS:  Please see medication reconciliation list.  His Eliquis seems to be on hold since recent hospitalization and drop in his hemoglobin. ALLERGIES:  No known drug allergies. FAMILY HISTORY:  Positive for alcoholism and hypertension. SOCIAL HISTORY:  Alcohol use, but he quit in the last 2 months. No current tobacco, alcohol, or drug use. Lives with his daughter. Requires assistance in his basic activities of daily living. REVIEW OF SYSTEMS:  Patient said his abdominal girth has been progressively getting worse in the last few days. Last night and today it has been bothering him with traction pain. Last night, he threw up while he was lying in bed. Today, he threw up again and he could not takes his medications. Last night, he had chills. Other 12-point review of systems is negative. PHYSICAL EXAMINATION:    GENERAL:  Alert, oriented to time, place, and person. Moderate distress. VITAL SIGNS:  Temperature 98.4, pulse 105, respiratory rate 27, blood pressure 95/73, pulse ox 94% on room air. HEENT:  Atraumatic. Oropharynx clear. Dry mucous membranes. Normal hard and soft palate. Eyes:  Anicteric sclerae. NECK:  Supple. No lymphadenopathy. Trachea midline. Full range of motion. LUNGS:  Clear to auscultation bilaterally. Normal respiratory effort. Diminished breathing sounds at both lung bases with faint rhonchi. HEART:  Regular rate and rhythm. S1, S2 auscultated. No murmur. ABDOMEN:  Moderate to severe distention with ascites. Hypoactive bowel sounds. EXTREMITIES:  No peripheral edema, clubbing, or cyanosis. NEUROLOGIC:  Motor and sensory intact. Cranial nerves II through XII are intact. ASSESSMENT:    1.   Large ascites with traction abdominal pain. 2.   Intravascular hypovolemia with mild acute kidney injury. 3.   Nausea, vomiting, and possible aspiration pneumonia with leukocytosis. PLAN:  Patient will be admitted to general medical floor. IV Zosyn. Paracentesis per Interventional Radiology.   Fluid for culture and follow up on blood cultures and procalcitonin. Monitor his hemodynamic status. Aspiration precautions. Speech therapy evaluation, though I suspect his aspiration is secondary to large-volume ascites and mechanical pressure on his gastric fundus and esophagus plus chest cavity. Further recommendations to follow.     Dictated By Munir Moe MD  d: 07/21/2023 15:58:48  t: 07/21/2023 16:25:17  Job [id number]  FB/    cc: Monik Salas MD

## 2023-07-21 NOTE — ED INITIAL ASSESSMENT (HPI)
Pt arrived via medics to rm 22 for complaint of vomiting and ascities.  States fluid removal [id number]

## 2023-07-21 NOTE — ED QUICK NOTES
Orders for admission, patient is aware of plan and ready to go upstairs. Any questions, please call ED RN [address] at extension [phone].      Patient Covid vaccination status: Unvaccinated     COVID Test Ordered in ED: None    COVID Suspicion at Admission: N/A    Running Infusions:      Mental Status/LOC at time of transport: aox4    Other pertinent information: had paracentesis in ER  CIWA score: N/A   NIH score:  N/A

## 2023-07-21 NOTE — PLAN OF CARE
Patient arrived to room in stable condition. Per ED RN, Kathleen Acharya- give one more dose of albumin post paracentesis. Admission questions answered with help of spouse at bedside.

## 2023-07-21 NOTE — IMAGING NOTE
1610: PT TO ULTRASOUND ROOM     HX TAKEN PROCEDURE EXPLAINED QUESTIONS ANSWERED. PT CONSENTED AT 5893    6436: HR 95 /74 O2 SATS: 92% ON RA    1617: SCANS COMPLETED BY bonnie RM TECH.    1633: PREM MACIAS  HERE, SCANNING COMPLETED. PLATELETS = 120     PT=  15.4   INR= 1.23    TIME OUT TAKEN AT 1636    1636: CHLORO PREP  AS SKIN PREP STERILE DRAPE APPLIED;  LIDOCAINE 1% 10 MILLIGRAMS PER ML GIVEN FROM KIT  FOR ANESTHETIC AFFECT 3 ML. GIVEN INCISION MADE WITH SCALPEL. FLUID ASPIRATED FOR LABS: YES-   60 ML SYRINGE COLLECTED.     5420: CATHETER HOOKED TO [address]. DRAINING CLEAR YELLOW COLOR FLUID.    1645: ~1424 ML DRAINED HR 94 BP 99/66 O2 SATS: 94% ON RA    1651: ~2228 ML DRAINED  HR 94 /78    1656: ~3431 ML DRAINED  HR 94 /73    1702: ~1632 ML DRAINED  HR 89 /68  O2 SATS: 96% ON RA.     1708: ~5823 ML DRAINED. ALBUMIN 25% 100 ML HUNG    1710: ~6220 ML HR 93 /68    1714: ~7017 ML DRAINED HR 90 /73    1720: ~8211 ML DRAINED  HR 91 /70    1725: ~9004 ML DRAINED HR 91 /65    1728: 2ND ALBUMIN 25% 100 ML HUNG    1730: ~93859 ML DRAINED HR 88 BP 99/67    1734: ~88430 ML DRAINED HR 89 BP 94/69    1745: PROCEDURE COMPLETE. PT RE SCANNED. DRAIN DC'D TOTAL AMOUNT DRAINED 13645 ML. HR 88 /69    PRESSURE TO SITE X 5 MIN AREA CLEANED STERI STRIPS TO SITE  2X2 GAUZE AND TEGADERM  TO SITE.    1753: PT IN STABLE CONDITION AND BROUGHT BACK TO ER ROOM 22 BY CART BY THIS RN.  VERBAL REPORT GIVEN TO ER RN, ENDORSED TO GIVE AT LEAST ONE MORE ALBUMIN THEN CHECK WITH DR TO SEE HOW MANY MORE THEY WANT TO GIVE. 1756: [facility] Ten DOMINGUEZ RN.

## 2023-07-21 NOTE — TELEPHONE ENCOUNTER
Dr Freed/ Dr Tad Whiting (office on call)    Just FYI  I called the patient's wife Keith Calle on JACQUELINE) to follow up on the referral for Dr Es Weiss (from 2023 TE). Patient's /name verified. She reported that the patient was wheezing while laying down last night but his breathing as she described is more \"raspy\" this morning. She stated the patient denies SOB and does not look SOB. He had chills last night but did not check temperature. He had paracentesis 7/10/2023 13,375ml removed, he is scheduled for repeat on 2023. I suggested to [facility] to call his PCP regarding his symptoms.     Thank you

## 2023-07-22 VITALS
WEIGHT: 180.31 LBS | RESPIRATION RATE: 16 BRPM | SYSTOLIC BLOOD PRESSURE: 108 MMHG | BODY MASS INDEX: 24.42 KG/M2 | HEIGHT: 72 IN | HEART RATE: 80 BPM | DIASTOLIC BLOOD PRESSURE: 77 MMHG | OXYGEN SATURATION: 97 % | TEMPERATURE: 98 F

## 2023-07-22 LAB
ALBUMIN SERPL-MCNC: 2.3 G/DL (ref 3.4–5)
ALBUMIN/GLOB SERPL: 0.9 {RATIO} (ref 1–2)
ALP LIVER SERPL-CCNC: 70 U/L
ALT SERPL-CCNC: 8 U/L
ANION GAP SERPL CALC-SCNC: 7 MMOL/L (ref 0–18)
AST SERPL-CCNC: 17 U/L (ref 15–37)
BASOPHILS # BLD AUTO: 0.07 X10(3) UL (ref 0–0.2)
BASOPHILS NFR BLD AUTO: 0.8 %
BILIRUB SERPL-MCNC: 0.7 MG/DL (ref 0.1–2)
BUN BLD-MCNC: 15 MG/DL (ref 7–18)
BUN/CREAT SERPL: 11.5 (ref 10–20)
C DIFF TOX B STL QL: NEGATIVE
CALCIUM BLD-MCNC: 7.9 MG/DL (ref 8.5–10.1)
CHLORIDE SERPL-SCNC: 104 MMOL/L (ref 98–112)
CO2 SERPL-SCNC: 26 MMOL/L (ref 21–32)
CREAT BLD-MCNC: 1.3 MG/DL
DEPRECATED RDW RBC AUTO: 42.8 FL (ref 35.1–46.3)
EGFRCR SERPLBLD CKD-EPI 2021: 63 ML/MIN/1.73M2 (ref 60–?)
EOSINOPHIL # BLD AUTO: 0.17 X10(3) UL (ref 0–0.7)
EOSINOPHIL NFR BLD AUTO: 2 %
ERYTHROCYTE [DISTWIDTH] IN BLOOD BY AUTOMATED COUNT: 17.8 % (ref 11–15)
GLOBULIN PLAS-MCNC: 2.7 G/DL (ref 2.8–4.4)
GLUCOSE BLD-MCNC: 73 MG/DL (ref 70–99)
GLUCOSE BLDC GLUCOMTR-MCNC: 105 MG/DL (ref 70–99)
GLUCOSE BLDC GLUCOMTR-MCNC: 126 MG/DL (ref 70–99)
GLUCOSE BLDC GLUCOMTR-MCNC: 149 MG/DL (ref 70–99)
HCT VFR BLD AUTO: 25.8 %
HGB BLD-MCNC: 7.7 G/DL
IMM GRANULOCYTES # BLD AUTO: 0.03 X10(3) UL (ref 0–1)
IMM GRANULOCYTES NFR BLD: 0.3 %
INR BLD: 1.37 (ref 0.85–1.16)
LYMPHOCYTES # BLD AUTO: 0.78 X10(3) UL (ref 1–4)
LYMPHOCYTES NFR BLD AUTO: 9 %
MCH RBC QN AUTO: 19.9 PG (ref 26–34)
MCHC RBC AUTO-ENTMCNC: 29.8 G/DL (ref 31–37)
MCV RBC AUTO: 66.8 FL
MONOCYTES # BLD AUTO: 0.53 X10(3) UL (ref 0.1–1)
MONOCYTES NFR BLD AUTO: 6.1 %
NEUTROPHILS # BLD AUTO: 7.04 X10 (3) UL (ref 1.5–7.7)
NEUTROPHILS # BLD AUTO: 7.04 X10(3) UL (ref 1.5–7.7)
NEUTROPHILS NFR BLD AUTO: 81.8 %
OSMOLALITY SERPL CALC.SUM OF ELEC: 283 MOSM/KG (ref 275–295)
PLATELET # BLD AUTO: 235 10(3)UL (ref 150–450)
POTASSIUM SERPL-SCNC: 3 MMOL/L (ref 3.5–5.1)
POTASSIUM SERPL-SCNC: 3.9 MMOL/L (ref 3.5–5.1)
PROT SERPL-MCNC: 5 G/DL (ref 6.4–8.2)
PROTHROMBIN TIME: 16.7 SECONDS (ref 11.6–14.8)
RBC # BLD AUTO: 3.86 X10(6)UL
SODIUM SERPL-SCNC: 137 MMOL/L (ref 136–145)
WBC # BLD AUTO: 8.6 X10(3) UL (ref 4–11)

## 2023-07-22 PROCEDURE — 99233 SBSQ HOSP IP/OBS HIGH 50: CPT | Performed by: INTERNAL MEDICINE

## 2023-07-22 PROCEDURE — 99239 HOSP IP/OBS DSCHRG MGMT >30: CPT | Performed by: HOSPITALIST

## 2023-07-22 RX ORDER — POTASSIUM CHLORIDE 20 MEQ/1
40 TABLET, EXTENDED RELEASE ORAL EVERY 4 HOURS
Status: COMPLETED | OUTPATIENT
Start: 2023-07-22 | End: 2023-07-22

## 2023-07-22 RX ORDER — AMOXICILLIN AND CLAVULANATE POTASSIUM 875; 125 MG/1; MG/1
1 TABLET, FILM COATED ORAL 2 TIMES DAILY
Qty: 10 TABLET | Refills: 0 | Status: SHIPPED | OUTPATIENT
Start: 2023-07-22 | End: 2023-07-27

## 2023-07-22 RX ORDER — FUROSEMIDE 20 MG/1
20 TABLET ORAL DAILY
Status: DISCONTINUED | OUTPATIENT
Start: 2023-07-22 | End: 2023-07-22

## 2023-07-22 RX ORDER — FUROSEMIDE 40 MG/1
20 TABLET ORAL DAILY
Qty: 30 TABLET | Refills: 1 | Status: SHIPPED | OUTPATIENT
Start: 2023-07-22

## 2023-07-22 RX ORDER — SPIRONOLACTONE 25 MG/1
25 TABLET ORAL DAILY
Status: DISCONTINUED | OUTPATIENT
Start: 2023-07-22 | End: 2023-07-22

## 2023-07-22 NOTE — PLAN OF CARE
Pt is aox3, resting in bed, denies any ab pain. Paracentesis done yesterday, site C/D/I. Iv abx infusing.    Problem: Patient Centered Care  Goal: Patient preferences are identified and integrated in the patient's plan of care  Description: Interventions:  - What would you like us to know as we care for you?   - Provide timely, complete, and accurate information to patient/family  - Incorporate patient and family knowledge, values, beliefs, and cultural backgrounds into the planning and delivery of care  - Encourage patient/family to participate in care and decision-making at the level they choose  - Honor patient and family perspectives and choices  Outcome: Progressing     Problem: Diabetes/Glucose Control  Goal: Glucose maintained within prescribed range  Description: INTERVENTIONS:  - Monitor Blood Glucose as ordered  - Assess for signs and symptoms of hyperglycemia and hypoglycemia  - Administer ordered medications to maintain glucose within target range  - Assess barriers to adequate nutritional intake and initiate nutrition consult as needed  - Instruct patient on self management of diabetes  Outcome: Progressing     Problem: PAIN - ADULT  Goal: Verbalizes/displays adequate comfort level or patient's stated pain goal  Description: INTERVENTIONS:  - Encourage pt to monitor pain and request assistance  - Assess pain using appropriate pain scale  - Administer analgesics based on type and severity of pain and evaluate response  - Implement non-pharmacological measures as appropriate and evaluate response  - Consider cultural and social influences on pain and pain management  - Manage/alleviate anxiety  - Utilize distraction and/or relaxation techniques  - Monitor for opioid side effects  - Notify MD/LIP if interventions unsuccessful or patient reports new pain  - Anticipate increased pain with activity and pre-medicate as appropriate  Outcome: Progressing     Problem: RISK FOR INFECTION - ADULT  Goal: Absence of fever/infection during anticipated neutropenic period  Description: INTERVENTIONS  - Monitor WBC  - Administer growth factors as ordered  - Implement neutropenic guidelines  Outcome: Progressing     Problem: SAFETY ADULT - FALL  Goal: Free from fall injury  Description: INTERVENTIONS:  - Assess pt frequently for physical needs  - Identify cognitive and physical deficits and behaviors that affect risk of falls.   - [place] fall precautions as indicated by assessment.  - Educate pt/family on patient safety including physical limitations  - Instruct pt to call for assistance with activity based on assessment  - Modify environment to reduce risk of injury  - Provide assistive devices as appropriate  - Consider OT/PT consult to assist with strengthening/mobility  - Encourage toileting schedule  Outcome: Progressing     Problem: DISCHARGE PLANNING  Goal: Discharge to home or other facility with appropriate resources  Description: INTERVENTIONS:  - Identify barriers to discharge w/pt and caregiver  - Include patient/family/discharge partner in discharge planning  - Arrange for needed discharge resources and transportation as appropriate  - Identify discharge learning needs (meds, wound care, etc)  - Arrange for interpreters to assist at discharge as needed  - Consider post-discharge preferences of patient/family/discharge partner  - Complete POLST form as appropriate  - Assess patient's ability to be responsible for managing their own health  - Refer to Case Management Department for coordinating discharge planning if the patient needs post-hospital services based on physician/LIP order or complex needs related to functional status, cognitive ability or social support system  Outcome: Progressing

## 2023-07-22 NOTE — PLAN OF CARE
Problem: Patient Centered Care  Goal: Patient preferences are identified and integrated in the patient's plan of care  Description: Interventions:  - What would you like us to know as we care for you? Pt states he is from home with his wife.   - Provide timely, complete, and accurate information to patient/family  - Incorporate patient and family knowledge, values, beliefs, and cultural backgrounds into the planning and delivery of care  - Encourage patient/family to participate in care and decision-making at the level they choose  - Honor patient and family perspectives and choices  Outcome: Progressing     Problem: Diabetes/Glucose Control  Goal: Glucose maintained within prescribed range  Description: INTERVENTIONS:  - Monitor Blood Glucose as ordered  - Assess for signs and symptoms of hyperglycemia and hypoglycemia  - Administer ordered medications to maintain glucose within target range  - Assess barriers to adequate nutritional intake and initiate nutrition consult as needed  - Instruct patient on self management of diabetes  Outcome: Progressing     Problem: PAIN - ADULT  Goal: Verbalizes/displays adequate comfort level or patient's stated pain goal  Description: INTERVENTIONS:  - Encourage pt to monitor pain and request assistance  - Assess pain using appropriate pain scale  - Administer analgesics based on type and severity of pain and evaluate response  - Implement non-pharmacological measures as appropriate and evaluate response  - Consider cultural and social influences on pain and pain management  - Manage/alleviate anxiety  - Utilize distraction and/or relaxation techniques  - Monitor for opioid side effects  - Notify MD/LIP if interventions unsuccessful or patient reports new pain  - Anticipate increased pain with activity and pre-medicate as appropriate  Outcome: Progressing     Problem: RISK FOR INFECTION - ADULT  Goal: Absence of fever/infection during anticipated neutropenic period  Description: INTERVENTIONS  - Monitor WBC  - Administer growth factors as ordered  - Implement neutropenic guidelines  Outcome: Progressing     Problem: SAFETY ADULT - FALL  Goal: Free from fall injury  Description: INTERVENTIONS:  - Assess pt frequently for physical needs  - Identify cognitive and physical deficits and behaviors that affect risk of falls.   - [place] fall precautions as indicated by assessment.  - Educate pt/family on patient safety including physical limitations  - Instruct pt to call for assistance with activity based on assessment  - Modify environment to reduce risk of injury  - Provide assistive devices as appropriate  - Consider OT/PT consult to assist with strengthening/mobility  - Encourage toileting schedule  Outcome: Progressing     Problem: DISCHARGE PLANNING  Goal: Discharge to home or other facility with appropriate resources  Description: INTERVENTIONS:  - Identify barriers to discharge w/pt and caregiver  - Include patient/family/discharge partner in discharge planning  - Arrange for needed discharge resources and transportation as appropriate  - Identify discharge learning needs (meds, wound care, etc)  - Arrange for interpreters to assist at discharge as needed  - Consider post-discharge preferences of patient/family/discharge partner  - Complete POLST form as appropriate  - Assess patient's ability to be responsible for managing their own health  - Refer to Case Management Department for coordinating discharge planning if the patient needs post-hospital services based on physician/LIP order or complex needs related to functional status, cognitive ability or social support system  Outcome: Progressing

## 2023-07-22 NOTE — SLP NOTE
ADULT SWALLOWING EVALUATION    ASSESSMENT    ASSESSMENT/OVERALL IMPRESSION:    PPE REQUIRED. THIS THERAPIST WORE GLOVES, DROPLET MASK, AND GOGGLES FOR DURATION OF EVALUATION. HANDS WASHED UPON ENTRANCE/EXIT. SLP BSSE orders received and acknowledged. A swallow evaluation warranted as per MD order \"Eval and tx; question for aspiration. \"  Per RN swallow screen, \"Coughs with water at times. Was on thickened liquids at some point, but no longer is per daughter. \" Pt afebrile with clear vocal quality, on room air with oxygen saturation at 98- 99% SPO2. Pt with known hx of dysphagia at [address] [place] [address], with most recent recommendation on 6/9/23 for \"Easy to chew solids with thin liquids. \"     Pt positioned upright at 90 degrees in bed. Oral mechanism examination unremarkable. Of note, patient with missing teeth. Pt self-fed regular solid and thin liquid via straw and cup sips. Pt demonstrated adequate oral acceptance and bilabial seal across all trials. Pt with intact bite, cohesive bolus formation, mastication of solids, and timely A-P transit. A minimal amount of residue remains on the superior lingual surface after the swallow, which patient clears with a liquid wash. Pt's swallow response appears timely with diminished adequate hyolaryngeal elevation/excursion to palpation. Patient demonstrated CSA (immediate cough x1 on continuous sips of thin liquid via straw, delayed throat clear x1 after multiple bites of regular solid). SLP reviewed safe swallow compensatory strategies/techniques with patient, including small bites/sips, single sips, alternation of solids/liquids, NO STRAWS, upright positioning, and use of liquid wash to clear oral residuals. Patient v/u and was able to return demonstration. CXR indicates \"Patchy left basilar opacity could be atelectasis, aspiration, infection. \" Oxygen status remained stable t/o the entire evaluation.      At this time, pt presents with minimal oral dysphagia and probable pharyngeal dysfunction. Recommend a regular solid diet and thin liquids with strict adherence to safe swallowing compensatory strategies. Medications should be administered whole with drink. Results and recommendations reviewed with RN and patient. PLAN: SLP to f/u x1-2 meal assessments, monitor CXR for any acute changes, and VFSS if clinically warranted. RECOMMENDATIONS   Diet Recommendations - Solids: Regular  Diet Recommendations - Liquids: Thin Liquids  Compensatory Strategies Recommended: No straws; Slow rate; Alternate consistencies;Small bites and sips;Multiple swallows  Aspiration Precautions: Upright position; Slow rate;Small bites and sips; No straw  Medication Administration Recommendations: One pill at a time  Treatment Plan/Recommendations: Aspiration precautions; Dysphagia therapy  Discharge Recommendations/Plan: Undetermined    HISTORY   MEDICAL HISTORY  Reason for Referral: R/O aspiration    Problem List  Principal Problem:    Abdominal distention  Active Problems:    Ascites due to alcoholic cirrhosis (HCC)    Anemia    Leukocytosis    Acute kidney injury [facility])      Past Medical History  Past Medical History:   Diagnosis Date    Esophageal reflux     High blood pressure     High cholesterol     Neuropathy        Prior Living Situation: Home with spouse  Diet Prior to Admission: Thin liquids;Soft/ Easy to Chew  Precautions: Aspiration      SWALLOWING HISTORY  Current Diet Consistency: Regular; Thin liquids  Dysphagia History: at [address] [place] [address] Avenue:   6/9/2023:  Recommend ETC solids/thin liquids, no straws, pills individually. 1/1/2023:  Soft bite sized diet with mildly thick liquids                Imaging Results:     CXR per Dr. Jolayne Goldmann on 7/21/22    CONCLUSION: Patchy left basilar opacity could be atelectasis, aspiration, infection         OBJECTIVE   ORAL MOTOR EXAMINATION  Dentition: Natural (Missing teeth)  Symmetry: Within Functional Limits  Strength:  Within Functional Limits     Range of Motion: Within Functional Limits  Rate of Motion: Within Functional Limits    Voice Quality: Clear  Respiratory Status: Unlabored  Consistencies Trialed: Thin liquids; Hard solid  Method of Presentation: Self presentation;Cup;Straw;Single sips; Consecutive swallows  Patient Positioning: Upright;Midline    Oral Phase of Swallow: Impaired  Bolus Retrieval: Intact  Bilabial Seal: Intact  Bolus Formation: Intact  Bolus Propulsion: Intact  Mastication: Intact  Retention: Impaired    Pharyngeal Phase of Swallow: Impaired  Laryngeal Elevation Timing: Appears intact  Laryngeal Elevation Strength: Appears impaired  Laryngeal Elevation Coordination: Appears intact  (Please note: Silent aspiration cannot be evaluated clinically. Videofluoroscopic Swallow Study is required to rule-out silent aspiration.)    Esophageal Phase of Swallow: No complaints consistent with possible esophageal involvement      GOALS  Goal #1 The patient will tolerate regular consistency and thin liquids without overt signs or symptoms of aspiration with 100 % accuracy over 1-2 session(s). In Progress   Goal #2 The patient/family/caregiver will demonstrate understanding and implementation of aspiration precautions and swallow strategies (NO STRAWS, solid/liquid alternation, small bites/sips, upright positioning) independently over 1-2 session(s). In Progress     FOLLOW UP  Treatment Plan/Recommendations: Aspiration precautions; Dysphagia therapy  Number of Visits to Meet Established Goals:  (1-2)  Follow Up Needed (Documentation Required): Yes  SLP Follow-up Date: 07/23/23    Thank you for your referral.   If you have any questions, please contact Jose Armando Campuzano, NIKOS Campuzano M.S., [place] [address] Pathologist  Phone Number [phone]

## 2023-07-22 NOTE — PLAN OF CARE
DC per MD order. DC paperwork reviewed with pt and pt's wife. PIV removed; tolerated well. All belongings taken with pt. Pt escorted downstairs to pt's wife's car. Pt stable at DC. Problem: Patient Centered Care  Goal: Patient preferences are identified and integrated in the patient's plan of care  Description: Interventions:  - What would you like us to know as we care for you?  My mom is in hospice and my brother is in from out of town  - Provide timely, complete, and accurate information to patient/family  - Incorporate patient and family knowledge, values, beliefs, and cultural backgrounds into the planning and delivery of care  - Encourage patient/family to participate in care and decision-making at the level they choose  - Honor patient and family perspectives and choices  Outcome: Adequate for Discharge     Problem: Diabetes/Glucose Control  Goal: Glucose maintained within prescribed range  Description: INTERVENTIONS:  - Monitor Blood Glucose as ordered  - Assess for signs and symptoms of hyperglycemia and hypoglycemia  - Administer ordered medications to maintain glucose within target range  - Assess barriers to adequate nutritional intake and initiate nutrition consult as needed  - Instruct patient on self management of diabetes  Outcome: Adequate for Discharge        Problem: PAIN - ADULT  Goal: Verbalizes/displays adequate comfort level or patient's stated pain goal  Description: INTERVENTIONS:  - Encourage pt to monitor pain and request assistance  - Assess pain using appropriate pain scale  - Administer analgesics based on type and severity of pain and evaluate response  - Implement non-pharmacological measures as appropriate and evaluate response  - Consider cultural and social influences on pain and pain management  - Manage/alleviate anxiety  - Utilize distraction and/or relaxation techniques  - Monitor for opioid side effects  - Notify MD/LIP if interventions unsuccessful or patient reports new pain  - Anticipate increased pain with activity and pre-medicate as appropriate  Outcome: Adequate for Discharge     Problem: RISK FOR INFECTION - ADULT  Goal: Absence of fever/infection during anticipated neutropenic period  Description: INTERVENTIONS  - Monitor WBC  - Administer growth factors as ordered  - Implement neutropenic guidelines  Outcome: Adequate for Discharge     Problem: SAFETY ADULT - FALL  Goal: Free from fall injury  Description: INTERVENTIONS:  - Assess pt frequently for physical needs  - Identify cognitive and physical deficits and behaviors that affect risk of falls.   - [place] fall precautions as indicated by assessment.  - Educate pt/family on patient safety including physical limitations  - Instruct pt to call for assistance with activity based on assessment  - Modify environment to reduce risk of injury  - Provide assistive devices as appropriate  - Consider OT/PT consult to assist with strengthening/mobility  - Encourage toileting schedule  Outcome: Adequate for Discharge     Problem: DISCHARGE PLANNING  Goal: Discharge to home or other facility with appropriate resources  Description: INTERVENTIONS:  - Identify barriers to discharge w/pt and caregiver  - Include patient/family/discharge partner in discharge planning  - Arrange for needed discharge resources and transportation as appropriate  - Identify discharge learning needs (meds, wound care, etc)  - Arrange for interpreters to assist at discharge as needed  - Consider post-discharge preferences of patient/family/discharge partner  - Complete POLST form as appropriate  - Assess patient's ability to be responsible for managing their own health  - Refer to Case Management Department for coordinating discharge planning if the patient needs post-hospital services based on physician/LIP order or complex needs related to functional status, cognitive ability or social support system  Outcome: Adequate for Discharge

## 2023-07-27 ENCOUNTER — TELEPHONE (OUTPATIENT)
Dept: CASE MANAGEMENT | Facility: HOSPITAL | Age: 63
End: 2023-07-27

## 2023-07-27 NOTE — CM/SW NOTE
Received call from Casey Cobian asking for paperwork from 7/21/23 admission. SW sent AVS and DC summary via [employer].

## 2023-08-03 ENCOUNTER — HOSPITAL ENCOUNTER (OUTPATIENT)
Dept: ULTRASOUND IMAGING | Facility: HOSPITAL | Age: 63
Discharge: HOME | End: 2023-08-03
Attending: INTERNAL MEDICINE
Payer: COMMERCIAL

## 2023-08-03 DIAGNOSIS — K70.31 ASCITES DUE TO ALCOHOLIC CIRRHOSIS (HCC): ICD-10-CM

## 2023-08-03 LAB
ALBUMIN FLD-MCNC: 1.2 G/DL
ALBUMIN SERPL-MCNC: 2.4 G/DL (ref 3.4–5)
ALBUMIN/GLOB SERPL: 0.6 {RATIO} (ref 1–2)
ALP LIVER SERPL-CCNC: 126 U/L
ALT SERPL-CCNC: 14 U/L
AMYLASE PRT-CCNC: 26 U/L
ANION GAP SERPL CALC-SCNC: 6 MMOL/L (ref 0–18)
AST SERPL-CCNC: 26 U/L (ref 15–37)
BASOPHILS NFR PRT: 0 %
BILIRUB SERPL-MCNC: 0.5 MG/DL (ref 0.1–2)
BUN BLD-MCNC: 18 MG/DL (ref 7–18)
BUN/CREAT SERPL: 13.1 (ref 10–20)
CALCIUM BLD-MCNC: 8.5 MG/DL (ref 8.5–10.1)
CHLORIDE SERPL-SCNC: 105 MMOL/L (ref 98–112)
CO2 SERPL-SCNC: 24 MMOL/L (ref 21–32)
CREAT BLD-MCNC: 1.37 MG/DL
DEPRECATED RDW RBC AUTO: 45.3 FL (ref 35.1–46.3)
EGFRCR SERPLBLD CKD-EPI 2021: 59 ML/MIN/1.73M2 (ref 60–?)
EOSINOPHIL NFR PRT: 0 %
ERYTHROCYTE [DISTWIDTH] IN BLOOD BY AUTOMATED COUNT: 19 % (ref 11–15)
FASTING STATUS PATIENT QL REPORTED: NO
GLOBULIN PLAS-MCNC: 4.2 G/DL (ref 2.8–4.4)
GLUCOSE BLD-MCNC: 119 MG/DL (ref 70–99)
HCT VFR BLD AUTO: 31.6 %
HGB BLD-MCNC: 9.3 G/DL
INR BLD: 1.1 (ref 0.85–1.16)
LIPASE FLD-CCNC: 10 U/L
LYMPHOCYTES NFR PRT: 74 %
MCH RBC QN AUTO: 20.3 PG (ref 26–34)
MCHC RBC AUTO-ENTMCNC: 29.4 G/DL (ref 31–37)
MCV RBC AUTO: 68.8 FL
MONOS+MACROS NFR PRT: 21 %
NEUTROPHILS NFR FLD: 5 %
OSMOLALITY SERPL CALC.SUM OF ELEC: 283 MOSM/KG (ref 275–295)
PLATELET # BLD AUTO: 360 10(3)UL (ref 150–450)
POTASSIUM SERPL-SCNC: 4.5 MMOL/L (ref 3.5–5.1)
PROT PRT-MCNC: 2.5 G/DL
PROT SERPL-MCNC: 6.6 G/DL (ref 6.4–8.2)
PROTHROMBIN TIME: 14.1 SECONDS (ref 11.6–14.8)
RBC # BLD AUTO: 4.59 X10(6)UL
RBC # FLD: 0 /CUMM (ref ?–1)
SODIUM SERPL-SCNC: 135 MMOL/L (ref 136–145)
TOTAL CELLS COUNTED FLD: 100
TOTAL CELLS COUNTED PRT: 450 /CUMM (ref ?–1)
TURBIDITY CSF QL: CLEAR
WBC # BLD AUTO: 13.7 X10(3) UL (ref 4–11)
WBC # PRT: 450 /CUMM

## 2023-08-03 PROCEDURE — 84157 ASSAY OF PROTEIN OTHER: CPT

## 2023-08-03 PROCEDURE — P9047 ALBUMIN (HUMAN), 25%, 50ML: HCPCS

## 2023-08-03 PROCEDURE — 89051 BODY FLUID CELL COUNT: CPT

## 2023-08-03 PROCEDURE — 49083 ABD PARACENTESIS W/IMAGING: CPT | Performed by: INTERNAL MEDICINE

## 2023-08-03 PROCEDURE — 36591 DRAW BLOOD OFF VENOUS DEVICE: CPT

## 2023-08-03 PROCEDURE — 85027 COMPLETE CBC AUTOMATED: CPT | Performed by: INTERNAL MEDICINE

## 2023-08-03 PROCEDURE — 36415 COLL VENOUS BLD VENIPUNCTURE: CPT | Performed by: INTERNAL MEDICINE

## 2023-08-03 PROCEDURE — 83690 ASSAY OF LIPASE: CPT

## 2023-08-03 PROCEDURE — 80053 COMPREHEN METABOLIC PANEL: CPT

## 2023-08-03 PROCEDURE — 82042 OTHER SOURCE ALBUMIN QUAN EA: CPT

## 2023-08-03 PROCEDURE — 85610 PROTHROMBIN TIME: CPT | Performed by: INTERNAL MEDICINE

## 2023-08-03 PROCEDURE — 82150 ASSAY OF AMYLASE: CPT

## 2023-08-03 PROCEDURE — 89050 BODY FLUID CELL COUNT: CPT

## 2023-08-03 RX ORDER — ALBUMIN (HUMAN) 12.5 G/50ML
200 SOLUTION INTRAVENOUS ONCE
Status: COMPLETED | OUTPATIENT
Start: 2023-08-03 | End: 2023-08-03

## 2023-08-03 RX ORDER — ALBUMIN (HUMAN) 12.5 G/50ML
SOLUTION INTRAVENOUS
Status: DISPENSED
Start: 2023-08-03 | End: 2023-08-03

## 2023-08-03 RX ORDER — MELATONIN
325
COMMUNITY

## 2023-08-03 RX ADMIN — ALBUMIN (HUMAN) 200 ML: 12.5 SOLUTION INTRAVENOUS at 08:30:00

## 2023-08-03 NOTE — IMAGING NOTE
0820 Pt to ultrasound room scouts taken by ESTELLE ACEVEDO RT    0808 HR 96V O2 %, /71    Hx taken procedure explained questions answered- see epic notes[     4448 Patient consented. Pt to get albumin 25% 25 grams yes or no     CINDY AMARO  Here to access- scanning completed and reviewed. PLATELETS =   137    PT=  16.7   INR= 1.37    0833 Timeout taken    0835 Chloro prep  as skin prep sterile drape applied lidocaine 1% 10 milligrams per ml from kit was given for anesthetic affect 5 ml total given. Incision made with scalpel. 8102 5 Namibian  10 Montenegrin 10 cm SpinGo catheter placed RIGHT LOWER abdomen    Fluid aspirated for labs  YES     (IF CYTOLOGY FLUID NEEDED --COLLECT 1 LITER OF FLUID IN VACUUM BOTTLE) PER PATHOLOGY    0837 Catheter connected  to tubing then connected to [email or web address] to [facility]. Draining begins continuously via  [place] [facility] System. 0900 L PIV 20 G PLACED LABS DRAWN, ALBUMIN INITIATED    /69 HR 82 O2 % @ 0900     0912 Draining completed. HR 82 /74 O2 %    Patient re scanned. total amount drained 7192 ml. Drain was dc'd. Pressure to site for 5 minutes. Area was cleaned steri strips to site. Post instructions was given verbally  provided to patient.  NEXT TX 8/08/23 @ 0800    1000 Discharged    0950  [employer] system was cleaned after procedure by [facility] OFELIA NAVAS RN

## 2023-08-08 ENCOUNTER — HOSPITAL ENCOUNTER (OUTPATIENT)
Dept: ULTRASOUND IMAGING | Facility: HOSPITAL | Age: 63
Discharge: HOME | End: 2023-08-08
Attending: INTERNAL MEDICINE
Payer: COMMERCIAL

## 2023-08-08 ENCOUNTER — TELEPHONE (OUTPATIENT)
Facility: CLINIC | Age: 63
End: 2023-08-08

## 2023-08-08 VITALS — SYSTOLIC BLOOD PRESSURE: 106 MMHG | HEART RATE: 91 BPM | DIASTOLIC BLOOD PRESSURE: 88 MMHG

## 2023-08-08 DIAGNOSIS — K70.31 ASCITES DUE TO ALCOHOLIC CIRRHOSIS (HCC): ICD-10-CM

## 2023-08-08 LAB
BASOPHILS NFR PRT: 0 %
EOSINOPHIL NFR PRT: 0 %
LYMPHOCYTES NFR PRT: 50 %
MONOS+MACROS NFR PRT: 43 %
NEUTROPHILS NFR FLD: 7 %
RBC # FLD: 22 /CUMM (ref ?–1)
TOTAL CELLS COUNTED FLD: 100
TOTAL CELLS COUNTED PRT: 271 /CUMM (ref ?–1)
TURBIDITY CSF QL: CLEAR
WBC # PRT: 271 /CUMM

## 2023-08-08 PROCEDURE — 89050 BODY FLUID CELL COUNT: CPT | Performed by: INTERNAL MEDICINE

## 2023-08-08 PROCEDURE — 49083 ABD PARACENTESIS W/IMAGING: CPT | Performed by: INTERNAL MEDICINE

## 2023-08-08 PROCEDURE — P9047 ALBUMIN (HUMAN), 25%, 50ML: HCPCS

## 2023-08-08 PROCEDURE — 87070 CULTURE OTHR SPECIMN AEROBIC: CPT | Performed by: INTERNAL MEDICINE

## 2023-08-08 PROCEDURE — 87205 SMEAR GRAM STAIN: CPT | Performed by: INTERNAL MEDICINE

## 2023-08-08 PROCEDURE — 89051 BODY FLUID CELL COUNT: CPT | Performed by: INTERNAL MEDICINE

## 2023-08-08 RX ORDER — ALBUMIN (HUMAN) 12.5 G/50ML
100 SOLUTION INTRAVENOUS AS NEEDED
Status: DISCONTINUED | OUTPATIENT
Start: 2023-08-08 | End: 2023-08-10

## 2023-08-08 RX ORDER — ALBUMIN (HUMAN) 12.5 G/50ML
SOLUTION INTRAVENOUS
Status: COMPLETED
Start: 2023-08-08 | End: 2023-08-08

## 2023-08-08 RX ADMIN — ALBUMIN (HUMAN) 100 ML: 12.5 SOLUTION INTRAVENOUS at 09:27:00

## 2023-08-08 RX ADMIN — ALBUMIN (HUMAN) 100 ML: 12.5 SOLUTION INTRAVENOUS at 09:13:00

## 2023-08-08 NOTE — TELEPHONE ENCOUNTER
Noted fluid cell count, culture in process. I spoke to [place] from the reference lab, she confirmed lab has received specimen. 7737 ml was removed during the US paracentesis today.

## 2023-08-08 NOTE — IMAGING NOTE
0855 Pt to ultrasound room in wheelchair, spouse Symone Jimenez present, pt stood w/2-person assist     Hx taken, pt not bothered by fluid buildup; pt familiar w/procedure, no questions     8307 Patient consented. Pt to get albumin 25% 25 grams per protocol    0936 PREM MACIAS, here to access- scanning completed and reviewed. 8/3/23 PLATELETS =  395   PT= 14.1  INR=  1.0    0819 scouts taken by Cecy Gardner    0680 Timeout taken    0839 Chloro prep as skin prep, sterile drape applied, lidocaine 1% 10 milligrams per ml from kit was given for anesthetic affect 5 ml total given. Incision made with scalpel. 4848 5 Chadian 10 cm Yueh catheter placed RIGHT LATERAL abdomen    Fluid aspirated for labs  YES, cell count & culture     0844 Catheter connected  to tubing then connected to [place] [facility] Direct to [facility]. Draining begins continuously via  [place] [facility] HSPTL System. Clear yellow fluid return    0851 ~ 1.4 L removed, /76 HR 92    Cumulative fluid removal totals as follows:    0855 ~ 2.5 L removed, /69 HR 91    0856 Called Dr Pratik Bush office, requested standing order for paracentesis    0902 ~ 4.1 L removed, /72 HR 90    1913 ~ 6.9 L removed, /73 HR 85; Albumin # 1 started    6935 Draining completed; saw Dr Adrienne Olguin note, recommends stop at 6L. Patient re scanned, total amount drained 7737 ml. Drain was dc'd. Pressure to site for 5 minutes. Area was cleaned, steri-strips to site, covered with gauze & Tegaderm  . 7938 Albumin # 1 completed, Albumin # 2 started. I called Dr Pratik Bush office again re:Dr Alcazar's recommendation for max removal of 6L, requested this be included in standing order. 5879 [employer] system was cleaned after procedure by KATELYN Badillo, RN     1810 Albumin # 2 completed     0942 /72 HR 81      Post instructions was given verbally and written after visit summary sheet provided to patient.     0220 Discharged in wheelchair to reception area, [facility]

## 2023-08-08 NOTE — DISCHARGE INSTRUCTIONS
7737 ml removed    You may remove the transparent & gauze dressings in 2 days, remove the steri-strips in 5 days.

## 2023-08-08 NOTE — TELEPHONE ENCOUNTER
Chapin Amor called to update that pt liver doctor wants pt to have 6 liters taken off please follow up

## 2023-08-15 ENCOUNTER — HOSPITAL ENCOUNTER (OUTPATIENT)
Dept: ULTRASOUND IMAGING | Facility: HOSPITAL | Age: 63
Discharge: HOME | End: 2023-08-15
Attending: INTERNAL MEDICINE
Payer: COMMERCIAL

## 2023-08-15 DIAGNOSIS — K70.31 ASCITES DUE TO ALCOHOLIC CIRRHOSIS (HCC): ICD-10-CM

## 2023-08-15 LAB
BASOPHILS NFR PRT: 0 %
COLOR FLD: YELLOW
DEPRECATED RDW RBC AUTO: 57.3 FL (ref 35.1–46.3)
EOSINOPHIL NFR PRT: 0 %
ERYTHROCYTE [DISTWIDTH] IN BLOOD BY AUTOMATED COUNT: 23.3 % (ref 11–15)
HCT VFR BLD AUTO: 32.8 %
HGB BLD-MCNC: 9.4 G/DL
INR BLD: 1.09 (ref 0.85–1.16)
LYMPHOCYTES NFR PRT: 55 %
MCH RBC QN AUTO: 20.5 PG (ref 26–34)
MCHC RBC AUTO-ENTMCNC: 28.7 G/DL (ref 31–37)
MCV RBC AUTO: 71.5 FL
MESOTHL CELL NFR PRT: 1 %
MONOS+MACROS NFR PRT: 33 %
NEUTROPHILS NFR FLD: 11 %
PLATELET # BLD AUTO: 333 10(3)UL (ref 150–450)
PROTHROMBIN TIME: 14 SECONDS (ref 11.6–14.8)
RBC # BLD AUTO: 4.59 X10(6)UL
RBC # FLD: 60 /CUMM (ref ?–1)
TOTAL CELLS COUNTED FLD: 100
TOTAL CELLS COUNTED PRT: 405 /CUMM (ref ?–1)
WBC # BLD AUTO: 11.5 X10(3) UL (ref 4–11)
WBC # PRT: 401 /CUMM

## 2023-08-15 PROCEDURE — 36415 COLL VENOUS BLD VENIPUNCTURE: CPT | Performed by: INTERNAL MEDICINE

## 2023-08-15 PROCEDURE — 87070 CULTURE OTHR SPECIMN AEROBIC: CPT | Performed by: INTERNAL MEDICINE

## 2023-08-15 PROCEDURE — 89050 BODY FLUID CELL COUNT: CPT | Performed by: INTERNAL MEDICINE

## 2023-08-15 PROCEDURE — P9047 ALBUMIN (HUMAN), 25%, 50ML: HCPCS

## 2023-08-15 PROCEDURE — 85610 PROTHROMBIN TIME: CPT | Performed by: INTERNAL MEDICINE

## 2023-08-15 PROCEDURE — 89051 BODY FLUID CELL COUNT: CPT | Performed by: INTERNAL MEDICINE

## 2023-08-15 PROCEDURE — 87205 SMEAR GRAM STAIN: CPT | Performed by: INTERNAL MEDICINE

## 2023-08-15 PROCEDURE — 85027 COMPLETE CBC AUTOMATED: CPT | Performed by: INTERNAL MEDICINE

## 2023-08-15 PROCEDURE — 36591 DRAW BLOOD OFF VENOUS DEVICE: CPT

## 2023-08-15 PROCEDURE — 49083 ABD PARACENTESIS W/IMAGING: CPT | Performed by: INTERNAL MEDICINE

## 2023-08-15 RX ORDER — ALBUMIN (HUMAN) 12.5 G/50ML
100 SOLUTION INTRAVENOUS AS NEEDED
Status: DISCONTINUED | OUTPATIENT
Start: 2023-08-15 | End: 2023-08-17

## 2023-08-15 RX ORDER — ALBUMIN (HUMAN) 12.5 G/50ML
SOLUTION INTRAVENOUS
Status: COMPLETED
Start: 2023-08-15 | End: 2023-08-15

## 2023-08-15 RX ADMIN — ALBUMIN (HUMAN) 100 ML: 12.5 SOLUTION INTRAVENOUS at 08:45:00

## 2023-08-15 NOTE — IMAGING NOTE
0758 Pt to ultrasound room scouts taken by FELIPE CARRASCO    /82     Hx taken procedure explained questions answered    0814 R PIV 20 G PLACED- LABS DRAWN     0803 Patient consented. Pt to get albumin 25% 25 grams yes     Max Pump APN  Here to access- scanning completed and reviewed. PLATELETS =  920    PT=  14.1   INR= 1.1    0830 Timeout taken    0830 Chloro prep  as skin prep sterile drape applied lidocaine 1% 10 milligrams per ml from kit was given for anesthetic affect 5 ml total given. Incision made with scalpel. 0469 5 Serbian  10 Macedonian 10 cm Cinemacrafteh catheter placed  LEFT LOWER abdomen    Fluid aspirated for labs  YES CELL COUNT AND CULTURES    (IF CYTOLOGY FLUID NEEDED --COLLECT 1 LITER OF FLUID IN VACUUM BOTTLE) PER PATHOLOGY    7180 Catheter connected  to tubing then connected to [employer] to [facility] One. Draining begins continuously via  [email or web address] System. /87 HR 89 O2 %    ALBUMIN #1 STARTED @ 0852    /90 HR 87 PO2 SAT 99%    0925  Draining completed. Patient re scanned. total amount drained 6415 ml. Drain was dc'd. Pressure to site for 5 minutes. Area was cleaned steri strips to site. 0930 /82 HR 85       Post instructions was given verbally provided to patient.     0930 Discharged with wife, home via wheelchair    2840 [email or web address] system was cleaned after procedure by [place] RN

## 2023-09-05 ENCOUNTER — HOSPITAL ENCOUNTER (OUTPATIENT)
Dept: ULTRASOUND IMAGING | Facility: HOSPITAL | Age: 63
Discharge: HOME | End: 2023-09-05
Attending: INTERNAL MEDICINE
Payer: COMMERCIAL

## 2023-09-05 DIAGNOSIS — K70.31 ASCITES DUE TO ALCOHOLIC CIRRHOSIS (HCC): ICD-10-CM

## 2023-09-05 LAB
BASOPHILS NFR PRT: 1 %
COLOR FLD: YELLOW
EOSINOPHIL NFR PRT: 0 %
LYMPHOCYTES NFR PRT: 27 %
MESOTHL CELL NFR PRT: 1 %
MONOS+MACROS NFR PRT: 65 %
NEUTROPHILS NFR FLD: 6 %
RBC # FLD: 100 /CUMM (ref ?–1)
TOTAL CELLS COUNTED FLD: 100
TOTAL CELLS COUNTED PRT: 567 /CUMM (ref ?–1)
TURBIDITY CSF QL: CLEAR
WBC # PRT: 561 /CUMM

## 2023-09-05 PROCEDURE — 89050 BODY FLUID CELL COUNT: CPT | Performed by: INTERNAL MEDICINE

## 2023-09-05 PROCEDURE — 87205 SMEAR GRAM STAIN: CPT | Performed by: INTERNAL MEDICINE

## 2023-09-05 PROCEDURE — 49083 ABD PARACENTESIS W/IMAGING: CPT | Performed by: INTERNAL MEDICINE

## 2023-09-05 PROCEDURE — 89051 BODY FLUID CELL COUNT: CPT | Performed by: INTERNAL MEDICINE

## 2023-09-05 PROCEDURE — 87070 CULTURE OTHR SPECIMN AEROBIC: CPT | Performed by: INTERNAL MEDICINE

## 2023-09-05 PROCEDURE — P9047 ALBUMIN (HUMAN), 25%, 50ML: HCPCS

## 2023-09-05 RX ORDER — ALBUMIN (HUMAN) 12.5 G/50ML
100 SOLUTION INTRAVENOUS AS NEEDED
Status: DISCONTINUED | OUTPATIENT
Start: 2023-09-05 | End: 2023-09-07

## 2023-09-05 RX ORDER — ALBUMIN (HUMAN) 12.5 G/50ML
SOLUTION INTRAVENOUS
Status: COMPLETED
Start: 2023-09-05 | End: 2023-09-05

## 2023-09-05 RX ADMIN — ALBUMIN (HUMAN) 100 ML: 12.5 SOLUTION INTRAVENOUS at 08:36:00

## 2023-09-05 NOTE — IMAGING NOTE
0750 Pt to ultrasound room scouts taken by Peterson Goodwin RT    Hx taken procedure explained questions answered    /74 HR 92 O2 SAT 99%     0803 Patient consented. Pt to get albumin 25% 25 grams yes     Hannah Goldmann APN  Here to access- scanning completed and reviewed. PLATELETS = 651     PT=  13.7   INR= 1.05    0818 Timeout taken    0816 Chloro prep  as skin prep sterile drape applied lidocaine 1% 10 milligrams per ml from kit was given for anesthetic affect 5 ml total given. Incision made with scalpel. 0818 5 Occitan  10 Khmer 10 cm Weichaishi.comeh catheter placed RIGHT LOWER abdomen    Fluid aspirated for labs  YES     (IF CYTOLOGY FLUID NEEDED --COLLECT 1 LITER OF FLUID IN VACUUM BOTTLE) PER PATHOLOGY    0818 Catheter connected  to tubing then connected to [employer] to [facility]. Draining begins continuously via  [place] [facility] COM HSPTL System. /75 O2 100% SAT, HR 89     0832 L PIV 22 G PLACED    0836 ALBUMIN #1 STARTED    0850  Draining completed. Patient re scanned. total amount drained 5000 ml. Drain was dc'd. ALBUMIN COMPLETED @ 0845    /72 HR 84 O2  %      Pressure to site for 5 minutes. Area was cleaned steri strips to site. Post instructions was given verbally  provided to patient.     0900 Discharged    0900  [employer] system was cleaned after procedure by [employer] RN

## 2023-09-11 ENCOUNTER — HOSPITAL ENCOUNTER (OUTPATIENT)
Dept: ULTRASOUND IMAGING | Facility: HOSPITAL | Age: 63
Discharge: HOME | End: 2023-09-11
Attending: INTERNAL MEDICINE
Payer: COMMERCIAL

## 2023-09-11 DIAGNOSIS — K70.31 ASCITES DUE TO ALCOHOLIC CIRRHOSIS (HCC): ICD-10-CM

## 2023-09-11 LAB
BASOPHILS NFR PRT: 0 %
COLOR FLD: YELLOW
DEPRECATED RDW RBC AUTO: 62.7 FL (ref 35.1–46.3)
EOSINOPHIL NFR PRT: 0 %
ERYTHROCYTE [DISTWIDTH] IN BLOOD BY AUTOMATED COUNT: 22.8 % (ref 11–15)
HCT VFR BLD AUTO: 34.8 %
HGB BLD-MCNC: 10.4 G/DL
INR BLD: 1.09 (ref 0.85–1.16)
LYMPHOCYTES NFR PRT: 41 %
MCH RBC QN AUTO: 22.9 PG (ref 26–34)
MCHC RBC AUTO-ENTMCNC: 29.9 G/DL (ref 31–37)
MCV RBC AUTO: 76.5 FL
MESOTHL CELL NFR PRT: 1 %
MONOS+MACROS NFR PRT: 51 %
NEUTROPHILS NFR FLD: 7 %
PLATELET # BLD AUTO: 256 10(3)UL (ref 150–450)
PROTHROMBIN TIME: 14 SECONDS (ref 11.6–14.8)
RBC # BLD AUTO: 4.55 X10(6)UL
RBC # FLD: 85 /CUMM (ref ?–1)
TOTAL CELLS COUNTED FLD: 100
TOTAL CELLS COUNTED PRT: 998 /CUMM (ref ?–1)
TURBIDITY CSF QL: CLEAR
WBC # BLD AUTO: 10.1 X10(3) UL (ref 4–11)
WBC # PRT: 988 /CUMM

## 2023-09-11 PROCEDURE — 49083 ABD PARACENTESIS W/IMAGING: CPT | Performed by: INTERNAL MEDICINE

## 2023-09-11 PROCEDURE — 85027 COMPLETE CBC AUTOMATED: CPT | Performed by: INTERNAL MEDICINE

## 2023-09-11 PROCEDURE — 36415 COLL VENOUS BLD VENIPUNCTURE: CPT | Performed by: INTERNAL MEDICINE

## 2023-09-11 PROCEDURE — 87077 CULTURE AEROBIC IDENTIFY: CPT | Performed by: INTERNAL MEDICINE

## 2023-09-11 PROCEDURE — 87186 SC STD MICRODIL/AGAR DIL: CPT | Performed by: INTERNAL MEDICINE

## 2023-09-11 PROCEDURE — 89051 BODY FLUID CELL COUNT: CPT | Performed by: INTERNAL MEDICINE

## 2023-09-11 PROCEDURE — 87205 SMEAR GRAM STAIN: CPT | Performed by: INTERNAL MEDICINE

## 2023-09-11 PROCEDURE — 36591 DRAW BLOOD OFF VENOUS DEVICE: CPT

## 2023-09-11 PROCEDURE — 89050 BODY FLUID CELL COUNT: CPT | Performed by: INTERNAL MEDICINE

## 2023-09-11 PROCEDURE — 87070 CULTURE OTHR SPECIMN AEROBIC: CPT | Performed by: INTERNAL MEDICINE

## 2023-09-11 PROCEDURE — 85610 PROTHROMBIN TIME: CPT | Performed by: INTERNAL MEDICINE

## 2023-09-11 PROCEDURE — P9047 ALBUMIN (HUMAN), 25%, 50ML: HCPCS

## 2023-09-11 RX ORDER — ALBUMIN (HUMAN) 12.5 G/50ML
SOLUTION INTRAVENOUS
Status: COMPLETED
Start: 2023-09-11 | End: 2023-09-11

## 2023-09-12 ENCOUNTER — TELEPHONE (OUTPATIENT)
Facility: CLINIC | Age: 63
End: 2023-09-12

## 2023-09-18 ENCOUNTER — HOSPITAL ENCOUNTER (OUTPATIENT)
Dept: ULTRASOUND IMAGING | Facility: HOSPITAL | Age: 63
Discharge: HOME | End: 2023-09-18
Attending: INTERNAL MEDICINE
Payer: COMMERCIAL

## 2023-09-18 DIAGNOSIS — K70.31 ASCITES DUE TO ALCOHOLIC CIRRHOSIS (HCC): ICD-10-CM

## 2023-09-18 LAB
BASOPHILS NFR PRT: 0 %
COLOR FLD: YELLOW
EOSINOPHIL NFR PRT: 0 %
LYMPHOCYTES NFR PRT: 62 %
MONOS+MACROS NFR PRT: 36 %
NEUTROPHILS NFR FLD: 2 %
RBC # FLD: 888 /CUMM (ref ?–1)
TOTAL CELLS COUNTED FLD: 100
TOTAL CELLS COUNTED PRT: 195 /CUMM (ref ?–1)
TURBIDITY CSF QL: CLEAR
WBC # PRT: 195 /CUMM

## 2023-09-18 PROCEDURE — 87186 SC STD MICRODIL/AGAR DIL: CPT | Performed by: INTERNAL MEDICINE

## 2023-09-18 PROCEDURE — P9047 ALBUMIN (HUMAN), 25%, 50ML: HCPCS

## 2023-09-18 PROCEDURE — 87070 CULTURE OTHR SPECIMN AEROBIC: CPT | Performed by: INTERNAL MEDICINE

## 2023-09-18 PROCEDURE — 89050 BODY FLUID CELL COUNT: CPT | Performed by: INTERNAL MEDICINE

## 2023-09-18 PROCEDURE — 87077 CULTURE AEROBIC IDENTIFY: CPT | Performed by: INTERNAL MEDICINE

## 2023-09-18 PROCEDURE — 49083 ABD PARACENTESIS W/IMAGING: CPT | Performed by: INTERNAL MEDICINE

## 2023-09-18 PROCEDURE — 89051 BODY FLUID CELL COUNT: CPT | Performed by: INTERNAL MEDICINE

## 2023-09-18 PROCEDURE — 87205 SMEAR GRAM STAIN: CPT | Performed by: INTERNAL MEDICINE

## 2023-09-18 RX ORDER — ALBUMIN (HUMAN) 12.5 G/50ML
SOLUTION INTRAVENOUS
Status: COMPLETED
Start: 2023-09-18 | End: 2023-09-18

## 2023-09-18 RX ORDER — ALBUMIN (HUMAN) 12.5 G/50ML
100 SOLUTION INTRAVENOUS AS NEEDED
Status: DISCONTINUED | OUTPATIENT
Start: 2023-09-18 | End: 2023-09-20

## 2023-09-18 RX ADMIN — ALBUMIN (HUMAN) 100 ML: 12.5 SOLUTION INTRAVENOUS at 09:00:00

## 2023-09-18 NOTE — IMAGING NOTE
0755 Pt to ultrasound room scouts taken by FELIPE DURAN RT    Hx taken procedure explained questions answered      [id number] Patient consented.  O2 %, BP     Pt to get albumin 25% 25 grams yes     0818 S SAMUEL AMARO  Here to access- scanning completed and reviewed. PLATELETS = 211     PT=  14.0   INR= 1.09    0824 Timeout taken    0822 Chloro prep  as skin prep sterile drape applied lidocaine 1% 10 milligrams per ml from kit was given for anesthetic affect 5 ml total given. Incision made with scalpel. 5 Located within [facility]  10 Kinyarwanda 10 cm yueh catheter placed RIGHT LOWER abdomen    Fluid aspirated for labs  YES     (IF CYTOLOGY FLUID NEEDED --COLLECT 1 LITER OF FLUID IN VACUUM BOTTLE) PER PATHOLOGY    8815 Catheter connected  to tubing then connected to [employer] Direct to [facility]. Draining begins continuously via  [place] [facility]. 0827 HR 93 /83 O2 %\    0900 ALBUMIN #1 STARTED    0900 /47 HR 92 PO2 SAT 98%    0855  Draining completed. Patient re scanned. total amount drained 2484 ml. Drain was dc'd. Pressure to site for 5 minutes. Area was cleaned steri strips to site.       Post instructions was given verbally  provided to patient.- EXTEND TREATMENT ADDITIONAL WEEK- 2 WEEK GAP    0915 Discharged    3915  [facility] system was cleaned after procedure by [facility] OFELIA NAVAS RN

## 2023-09-18 NOTE — IMAGING NOTE
***Pt to ultrasound room scouts taken by ***    ***Hx taken procedure explained questions answered     ***Patient consented. Pt to get albumin 25% 25 grams yes     Monique AMARO  Here to access- scanning completed and reviewed. PLATELETS = 799     PT=  14.0   INR= 1.09    ***Timeout taken    ***Chloro prep  as skin prep sterile drape applied lidocaine 1% 10 milligrams per ml from kit was given for anesthetic affect *** ml total given. Incision made with scalpel. ***5 [place]  10 Citizen of [place] 10 cm yueh catheter placed *** abdomen    Fluid aspirated for labs  YES OR NO     (IF CYTOLOGY FLUID NEEDED --COLLECT 1 LITER OF FLUID IN VACUUM BOTTLE) PER PATHOLOGY    ***Catheter connected  to tubing then connected to [employer] to [facility]. Draining begins continuously via  [employer] System. *** Draining completed. Patient re scanned. total amount drained ***ml.  Drain was dc'd. Pressure to site for 5 minutes. Area was cleaned steri strips to site. ***Post instructions was given verbally and written  after visit summary sheet provided to patient.     ***Discharged    *** [facility] system was cleaned after procedure by ***

## 2023-09-19 ENCOUNTER — TELEPHONE (OUTPATIENT)
Facility: CLINIC | Age: 63
End: 2023-09-19

## 2023-09-19 NOTE — TELEPHONE ENCOUNTER
Dr Parker  (office on call)    Received a call from [facility] from reference lab for a critical value. Contains abnormal data Body Fluid Cult Aerobic and Anaerobic  Order: [id number]  Collected 9/18/2023  8:26 AM       Status: Preliminary result    Specimen Information: Ascites fluid; Body fluid, unspecified   0 Result Notes  Body Fluid Culture Result: Pending               Body Fluid Smear  Abnormal   2+ WBCs seen      No organisms seen      This is a cytocentrifuged smear.       Gram stain of positive bottle shows:      Gram positive cocci in clusters             Thank you

## 2023-09-19 NOTE — TELEPHONE ENCOUNTER
Office on call -- Chart reviewed, notation that the patient had a positive ascites tap about a week ago with same information, gram-positive in clusters and grew out staph epi which is usually considered a skin contaminant. Discussed this with the patient's wife via the phone this evening that the paracentesis from yesterday did also show the same gram positive findings. .  He does not really have any symptoms. No fevers. We will forward to Dr. Hugh Faria, possible contaminant but will get his input on this. Patient and/or his wife are to call if any signs of infection abdominal pain or decompensation.

## 2023-09-25 ENCOUNTER — HOSPITAL ENCOUNTER (OUTPATIENT)
Dept: ULTRASOUND IMAGING | Facility: HOSPITAL | Age: 63
Discharge: HOME | End: 2023-09-25
Attending: INTERNAL MEDICINE
Payer: COMMERCIAL

## 2023-10-02 ENCOUNTER — HOSPITAL ENCOUNTER (OUTPATIENT)
Dept: ULTRASOUND IMAGING | Facility: HOSPITAL | Age: 63
Discharge: HOME | End: 2023-10-02
Attending: INTERNAL MEDICINE
Payer: COMMERCIAL

## 2023-10-02 DIAGNOSIS — K70.31 ASCITES DUE TO ALCOHOLIC CIRRHOSIS (HCC): ICD-10-CM

## 2023-10-02 LAB
DEPRECATED RDW RBC AUTO: 54.4 FL (ref 35.1–46.3)
ERYTHROCYTE [DISTWIDTH] IN BLOOD BY AUTOMATED COUNT: 19.1 % (ref 11–15)
HCT VFR BLD AUTO: 38.1 %
HGB BLD-MCNC: 11.4 G/DL
INR BLD: 1.05 (ref 0.85–1.16)
MCH RBC QN AUTO: 23.2 PG (ref 26–34)
MCHC RBC AUTO-ENTMCNC: 29.9 G/DL (ref 31–37)
MCV RBC AUTO: 77.6 FL
PLATELET # BLD AUTO: 212 10(3)UL (ref 150–450)
PROTHROMBIN TIME: 14.3 SECONDS (ref 11.6–14.8)
RBC # BLD AUTO: 4.91 X10(6)UL
WBC # BLD AUTO: 11 X10(3) UL (ref 4–11)

## 2023-10-02 PROCEDURE — 36591 DRAW BLOOD OFF VENOUS DEVICE: CPT

## 2023-10-02 PROCEDURE — 85610 PROTHROMBIN TIME: CPT | Performed by: CLINICAL NURSE SPECIALIST

## 2023-10-02 PROCEDURE — 76705 ECHO EXAM OF ABDOMEN: CPT | Performed by: INTERNAL MEDICINE

## 2023-10-02 PROCEDURE — 85027 COMPLETE CBC AUTOMATED: CPT | Performed by: CLINICAL NURSE SPECIALIST

## 2023-10-02 PROCEDURE — 36415 COLL VENOUS BLD VENIPUNCTURE: CPT | Performed by: CLINICAL NURSE SPECIALIST

## 2023-10-02 RX ORDER — ALBUMIN (HUMAN) 12.5 G/50ML
100 SOLUTION INTRAVENOUS AS NEEDED
Status: DISCONTINUED | OUTPATIENT
Start: 2023-10-02 | End: 2023-10-04

## 2023-10-02 NOTE — IMAGING NOTE
0755 Pt to ultrasound room scouts taken by Adolfo Mitchell RT    Hx taken procedure explained questions answered    R PIV 20 G PLACED FOR ALBUMIN AND LAB DRAW.  SCAN DONE. SMALL AMOUNT OF FLUID NOTED AFTER A 2 WEEK GAP SINCE LAST TREATMENT. CINDY ALTAMIRANO NOTIFIED AND IT WAS AGREED UPON BY ALL PARTIES THAT TODAY'S PROCEDURE CANCELLED AND WILL RE-EVALUATE NEXT WEEK IF A TREATMENT IS NECESSARY. IV REMOVED AND PROCEDURE CANCELLED FOR TODAY.  DISCHARGE HOME WIT HIS WIFE    LABS SENT IN PREPARATION FOR NEXT WEEKS TREATMENT

## 2023-10-04 ENCOUNTER — TELEPHONE (OUTPATIENT)
Facility: CLINIC | Age: 63
End: 2023-10-04

## 2023-10-04 NOTE — TELEPHONE ENCOUNTER
Gabbie Robertson MD  10/2/2023 10:45 AM CDT Back to Top      Please contact the patient. I would have him record his weight at home today and monitor weight and abdominal girth. I would not proceed with repeat attempts at paracentesis unless the patient's weight/abdominal girth are increasing which would likely occur no sooner than in 2 weeks (if then). His liver function is likely improving which is resulting in decreased fluid accumulation. I spoke to the patient's spouse, Rg paniagua, patient's /name verified. I instructed Rg arcelia to record the patient's weight (same time and same clothes for accuracy) and abdominal girth daily. I advised Rg allcheo to cancel the appt on 10/9 for US paracentesis, but she planned to schedule the following week in case he needs it.

## 2023-10-09 ENCOUNTER — HOSPITAL ENCOUNTER (OUTPATIENT)
Dept: ULTRASOUND IMAGING | Facility: HOSPITAL | Age: 63
Discharge: HOME | End: 2023-10-09
Attending: INTERNAL MEDICINE
Payer: COMMERCIAL

## 2023-10-09 DIAGNOSIS — K70.31 ASCITES DUE TO ALCOHOLIC CIRRHOSIS (HCC): ICD-10-CM

## 2023-10-09 PROCEDURE — 76705 ECHO EXAM OF ABDOMEN: CPT | Performed by: INTERNAL MEDICINE

## 2023-10-09 NOTE — IMAGING NOTE
0752 Pt to ultrasound room 3 in wheelchair, spouse Jaswant Puente present. 0755 Hx taken, pt not feeling sensation of ascites, no symptoms. Pt  familiar w/procedure, no questions     5218 Patient consented. BP 98/62 (70), HR 93    10/2/23 PLATELETS =  688  PT=  14.3  INR= 1.05    0812 Scouts taken by Bailey Vickers; insufficient fluid; checked against last week's scan, no change     0816 Notified PREM Russo     [id number] Discharged in wheelchair; message left for  to make appt for 2 week return; pt to call if needs appt sooner.  Pt has upcoming appt w/liver specialist.

## 2023-10-23 ENCOUNTER — HOSPITAL ENCOUNTER (OUTPATIENT)
Dept: ULTRASOUND IMAGING | Facility: HOSPITAL | Age: 63
Discharge: HOME | End: 2023-10-23
Attending: INTERNAL MEDICINE

## 2023-10-23 DIAGNOSIS — K70.31 ASCITES DUE TO ALCOHOLIC CIRRHOSIS (HCC): ICD-10-CM

## 2023-10-23 PROCEDURE — 76705 ECHO EXAM OF ABDOMEN: CPT | Performed by: INTERNAL MEDICINE

## 2023-10-26 ENCOUNTER — APPOINTMENT (OUTPATIENT)
Dept: CT IMAGING | Facility: HOSPITAL | Age: 63
End: 2023-10-26
Attending: EMERGENCY MEDICINE
Payer: COMMERCIAL

## 2023-10-26 ENCOUNTER — HOSPITAL ENCOUNTER (INPATIENT)
Facility: HOSPITAL | Age: 63
LOS: 1 days | Discharge: HOME HEALTH | End: 2023-10-28
Attending: EMERGENCY MEDICINE | Admitting: INTERNAL MEDICINE
Payer: COMMERCIAL

## 2023-10-26 DIAGNOSIS — K76.82 HEPATIC ENCEPHALOPATHY (HCC): Primary | ICD-10-CM

## 2023-10-26 LAB
ALBUMIN SERPL-MCNC: 3.2 G/DL (ref 3.4–5)
ALP LIVER SERPL-CCNC: 134 U/L
ALT SERPL-CCNC: 30 U/L
AMMONIA PLAS-MCNC: 40 UMOL/L (ref 11–32)
ANION GAP SERPL CALC-SCNC: 8 MMOL/L (ref 0–18)
AST SERPL-CCNC: 40 U/L (ref 15–37)
BASOPHILS # BLD AUTO: 0.11 X10(3) UL (ref 0–0.2)
BASOPHILS NFR BLD AUTO: 1.1 %
BILIRUB DIRECT SERPL-MCNC: 0.2 MG/DL (ref 0–0.2)
BILIRUB SERPL-MCNC: 0.6 MG/DL (ref 0.1–2)
BUN BLD-MCNC: 31 MG/DL (ref 7–18)
BUN/CREAT SERPL: 21.4 (ref 10–20)
CALCIUM BLD-MCNC: 9 MG/DL (ref 8.5–10.1)
CHLORIDE SERPL-SCNC: 105 MMOL/L (ref 98–112)
CO2 SERPL-SCNC: 25 MMOL/L (ref 21–32)
CREAT BLD-MCNC: 1.45 MG/DL
DEPRECATED RDW RBC AUTO: 46.1 FL (ref 35.1–46.3)
EGFRCR SERPLBLD CKD-EPI 2021: 55 ML/MIN/1.73M2 (ref 60–?)
EOSINOPHIL # BLD AUTO: 0.21 X10(3) UL (ref 0–0.7)
EOSINOPHIL NFR BLD AUTO: 2 %
ERYTHROCYTE [DISTWIDTH] IN BLOOD BY AUTOMATED COUNT: 16.4 % (ref 11–15)
GLUCOSE BLD-MCNC: 139 MG/DL (ref 70–99)
GLUCOSE BLDC GLUCOMTR-MCNC: 153 MG/DL (ref 70–99)
HCT VFR BLD AUTO: 37.4 %
HGB BLD-MCNC: 12 G/DL
IMM GRANULOCYTES # BLD AUTO: 0.05 X10(3) UL (ref 0–1)
IMM GRANULOCYTES NFR BLD: 0.5 %
LIPASE SERPL-CCNC: 72 U/L (ref 13–75)
LYMPHOCYTES # BLD AUTO: 3.25 X10(3) UL (ref 1–4)
LYMPHOCYTES NFR BLD AUTO: 31.3 %
MCH RBC QN AUTO: 24.8 PG (ref 26–34)
MCHC RBC AUTO-ENTMCNC: 32.1 G/DL (ref 31–37)
MCV RBC AUTO: 77.3 FL
MONOCYTES # BLD AUTO: 1.02 X10(3) UL (ref 0.1–1)
MONOCYTES NFR BLD AUTO: 9.8 %
NEUTROPHILS # BLD AUTO: 5.73 X10 (3) UL (ref 1.5–7.7)
NEUTROPHILS # BLD AUTO: 5.73 X10(3) UL (ref 1.5–7.7)
NEUTROPHILS NFR BLD AUTO: 55.3 %
OSMOLALITY SERPL CALC.SUM OF ELEC: 295 MOSM/KG (ref 275–295)
PLATELET # BLD AUTO: 192 10(3)UL (ref 150–450)
POTASSIUM SERPL-SCNC: 3.9 MMOL/L (ref 3.5–5.1)
PROT SERPL-MCNC: 8 G/DL (ref 6.4–8.2)
RBC # BLD AUTO: 4.84 X10(6)UL
SODIUM SERPL-SCNC: 138 MMOL/L (ref 136–145)
WBC # BLD AUTO: 10.4 X10(3) UL (ref 4–11)

## 2023-10-26 PROCEDURE — 70450 CT HEAD/BRAIN W/O DYE: CPT | Performed by: EMERGENCY MEDICINE

## 2023-10-26 RX ORDER — DIPHENHYDRAMINE HYDROCHLORIDE 50 MG/ML
50 INJECTION INTRAMUSCULAR; INTRAVENOUS ONCE
Status: COMPLETED | OUTPATIENT
Start: 2023-10-26 | End: 2023-10-26

## 2023-10-26 RX ORDER — LACTULOSE 10 G/15ML
30 SOLUTION ORAL ONCE
Status: COMPLETED | OUTPATIENT
Start: 2023-10-26 | End: 2023-10-26

## 2023-10-27 PROBLEM — E72.20 HYPERAMMONEMIA (HCC): Status: ACTIVE | Noted: 2023-10-27

## 2023-10-27 PROBLEM — R41.0 DISORIENTATION: Status: ACTIVE | Noted: 2021-04-15

## 2023-10-27 LAB
EST. AVERAGE GLUCOSE BLD GHB EST-MCNC: 114 MG/DL (ref 68–126)
GLUCOSE BLDC GLUCOMTR-MCNC: 112 MG/DL (ref 70–99)
GLUCOSE BLDC GLUCOMTR-MCNC: 129 MG/DL (ref 70–99)
GLUCOSE BLDC GLUCOMTR-MCNC: 149 MG/DL (ref 70–99)
GLUCOSE BLDC GLUCOMTR-MCNC: 169 MG/DL (ref 70–99)
GLUCOSE BLDC GLUCOMTR-MCNC: 88 MG/DL (ref 70–99)
HBA1C MFR BLD: 5.6 % (ref ?–5.7)
TSI SER-ACNC: 3.29 MIU/ML (ref 0.36–3.74)
VIT B12 SERPL-MCNC: 602 PG/ML (ref 193–986)

## 2023-10-27 PROCEDURE — 99223 1ST HOSP IP/OBS HIGH 75: CPT | Performed by: STUDENT IN AN ORGANIZED HEALTH CARE EDUCATION/TRAINING PROGRAM

## 2023-10-27 PROCEDURE — 95816 EEG AWAKE AND DROWSY: CPT | Performed by: OTHER

## 2023-10-27 RX ORDER — PANTOPRAZOLE SODIUM 40 MG/1
40 TABLET, DELAYED RELEASE ORAL DAILY
Status: DISCONTINUED | OUTPATIENT
Start: 2023-10-27 | End: 2023-10-28

## 2023-10-27 RX ORDER — LORAZEPAM 1 MG/1
1 TABLET ORAL ONCE AS NEEDED
Status: ACTIVE | OUTPATIENT
Start: 2023-10-27 | End: 2023-10-27

## 2023-10-27 RX ORDER — FUROSEMIDE 20 MG/1
20 TABLET ORAL DAILY
Status: DISCONTINUED | OUTPATIENT
Start: 2023-10-27 | End: 2023-10-28

## 2023-10-27 RX ORDER — MELATONIN
100 DAILY
Status: DISCONTINUED | OUTPATIENT
Start: 2023-10-27 | End: 2023-10-28

## 2023-10-27 RX ORDER — PROCHLORPERAZINE EDISYLATE 5 MG/ML
5 INJECTION INTRAMUSCULAR; INTRAVENOUS EVERY 8 HOURS PRN
Status: DISCONTINUED | OUTPATIENT
Start: 2023-10-27 | End: 2023-10-28

## 2023-10-27 RX ORDER — MULTIPLE VITAMINS W/ MINERALS TAB 9MG-400MCG
1 TAB ORAL DAILY
Status: DISCONTINUED | OUTPATIENT
Start: 2023-10-27 | End: 2023-10-28

## 2023-10-27 RX ORDER — ACETAMINOPHEN 500 MG
500 TABLET ORAL EVERY 4 HOURS PRN
Status: DISCONTINUED | OUTPATIENT
Start: 2023-10-27 | End: 2023-10-28

## 2023-10-27 RX ORDER — ONDANSETRON 2 MG/ML
4 INJECTION INTRAMUSCULAR; INTRAVENOUS EVERY 6 HOURS PRN
Status: DISCONTINUED | OUTPATIENT
Start: 2023-10-27 | End: 2023-10-28

## 2023-10-27 RX ORDER — GABAPENTIN 300 MG/1
300 CAPSULE ORAL 2 TIMES DAILY
Status: DISCONTINUED | OUTPATIENT
Start: 2023-10-27 | End: 2023-10-28

## 2023-10-27 RX ORDER — LACTULOSE 10 G/15ML
10 SOLUTION ORAL DAILY
Status: DISCONTINUED | OUTPATIENT
Start: 2023-10-28 | End: 2023-10-28

## 2023-10-27 RX ORDER — MIDODRINE HYDROCHLORIDE 5 MG/1
10 TABLET ORAL 3 TIMES DAILY
Status: DISCONTINUED | OUTPATIENT
Start: 2023-10-27 | End: 2023-10-28

## 2023-10-27 RX ORDER — FOLIC ACID 1 MG/1
1 TABLET ORAL DAILY
Status: DISCONTINUED | OUTPATIENT
Start: 2023-10-27 | End: 2023-10-28

## 2023-10-27 RX ORDER — ATORVASTATIN CALCIUM 40 MG/1
40 TABLET, FILM COATED ORAL NIGHTLY
COMMUNITY

## 2023-10-27 RX ORDER — SPIRONOLACTONE 25 MG/1
25 TABLET ORAL DAILY
Status: DISCONTINUED | OUTPATIENT
Start: 2023-10-27 | End: 2023-10-28

## 2023-10-27 RX ORDER — HEPARIN SODIUM 5000 [USP'U]/ML
5000 INJECTION, SOLUTION INTRAVENOUS; SUBCUTANEOUS EVERY 8 HOURS SCHEDULED
Status: DISCONTINUED | OUTPATIENT
Start: 2023-10-27 | End: 2023-10-28

## 2023-10-27 NOTE — ED QUICK NOTES
Orders for admission, patient is aware of plan and ready to go upstairs. Any questions, please call ED RN Gordon Arreaga at extension [phone]. Patient Covid vaccination status: Unvaccinated     COVID Test Ordered in ED: None    COVID Suspicion at Admission: N/A    Running Infusions:  None    Mental Status/LOC at time of transport: AOx2    Other pertinent information: patient confused and irritable.   CIWA score: N/A   NIH score:  1

## 2023-10-27 NOTE — ED QUICK NOTES
Care endorsed to [facility]. Patient resting on stretcher, on monitor, has no current needs at this time.    Wife at bedside

## 2023-10-27 NOTE — CM/SW NOTE
Addendum:Patient is current with [facility] for PT. [facility] is reserved in [place]. Upload PT/OT notes when available. Social work received an update from therapy that they are recommending home health. Social work met with the patient at bedside to discuss recommendations. The patient is agreeable to home health. Social work advised the patient that a [facility] list would be provided when available. Social work sent [facility] referrals in [address] [place] [address]. PT/OT notes pending. SW/CM to remain available for support and/or discharge planning.      Hermelinda Vitale MSW, [facility]  Discharge Planner [id number]

## 2023-10-27 NOTE — PLAN OF CARE
No acute changes overnight. Problem: Patient Centered Care  Goal: Patient preferences are identified and integrated in the patient's plan of care  Description: Interventions:  - What would you like us to know as we care for you?   - Provide timely, complete, and accurate information to patient/family  - Incorporate patient and family knowledge, values, beliefs, and cultural backgrounds into the planning and delivery of care  - Encourage patient/family to participate in care and decision-making at the level they choose  - Honor patient and family perspectives and choices  Outcome: Progressing     Problem: SAFETY ADULT - FALL  Goal: Free from fall injury  Description: INTERVENTIONS:  - Assess pt frequently for physical needs  - Identify cognitive and physical deficits and behaviors that affect risk of falls.   - [place] fall precautions as indicated by assessment.  - Educate pt/family on patient safety including physical limitations  - Instruct pt to call for assistance with activity based on assessment  - Modify environment to reduce risk of injury  - Provide assistive devices as appropriate  - Consider OT/PT consult to assist with strengthening/mobility  - Encourage toileting schedule  Outcome: Progressing     Problem: GASTROINTESTINAL - ADULT  Goal: Minimal or absence of nausea and vomiting  Description: INTERVENTIONS:  - Maintain adequate hydration with IV or PO as ordered and tolerated  - Nasogastric tube to low intermittent suction as ordered  - Evaluate effectiveness of ordered antiemetic medications  - Provide nonpharmacologic comfort measures as appropriate  - Advance diet as tolerated, if ordered  - Obtain nutritional consult as needed  - Evaluate fluid balance  Outcome: Progressing     Problem: SKIN/TISSUE INTEGRITY - ADULT  Goal: Skin integrity remains intact  Description: INTERVENTIONS  - Assess and document risk factors for pressure ulcer development  - Assess and document skin integrity  - Monitor for areas of redness and/or skin breakdown  - Initiate interventions, skin care algorithm/standards of care as needed  Outcome: Progressing     Problem: NEUROLOGICAL - ADULT  Goal: Achieves stable or improved neurological status  Description: INTERVENTIONS  - Assess for and report changes in neurological status  - Initiate measures to prevent increased intracranial pressure  - Maintain blood pressure and fluid volume within ordered parameters to optimize cerebral perfusion and minimize risk of hemorrhage  - Monitor temperature, glucose, and sodium.  Initiate appropriate interventions as ordered  Outcome: Progressing     Problem: Impaired Functional Mobility  Goal: Achieve highest/safest level of mobility/gait  Description: Interventions:  - Assess patient's functional ability and stability  - Promote increasing activity/tolerance for mobility and gait  - Educate and engage patient/family in tolerated activity level and precautions    Outcome: Progressing

## 2023-10-27 NOTE — ED INITIAL ASSESSMENT (HPI)
Patient presents to ER via EMS for concerns of acute aphagia. Per medics patient was out walking with wife, when he had trouble finding his words.    Aox1-2 on arrival. LKW 830pm  HX TIA

## 2023-10-27 NOTE — DISCHARGE INSTRUCTIONS
Sometimes managing your health at home requires assistance. The [facility] team has recognized your preference to use [facility]. They can be reached at [phone]. The fax number for your reference is [phone]. A representative from the home health agency will contact you or your family to schedule your first visit.

## 2023-10-27 NOTE — PLAN OF CARE
Patient is A&Ox2/3, room air, 1 with walker. EEG to be done along with MRI. MRI screening completed, pt will need to get medication prior to MRI d/t claustrophobia called dept and notified them. Call light within reach and fall precautions in place. Problem: Patient Centered Care  Goal: Patient preferences are identified and integrated in the patient's plan of care  Description: Interventions:  - What would you like us to know as we care for you? From home with wife  - Provide timely, complete, and accurate information to patient/family  - Incorporate patient and family knowledge, values, beliefs, and cultural backgrounds into the planning and delivery of care  - Encourage patient/family to participate in care and decision-making at the level they choose  - Honor patient and family perspectives and choices  Outcome: Progressing     Problem: SAFETY ADULT - FALL  Goal: Free from fall injury  Description: INTERVENTIONS:  - Assess pt frequently for physical needs  - Identify cognitive and physical deficits and behaviors that affect risk of falls.   - [place] fall precautions as indicated by assessment.  - Educate pt/family on patient safety including physical limitations  - Instruct pt to call for assistance with activity based on assessment  - Modify environment to reduce risk of injury  - Provide assistive devices as appropriate  - Consider OT/PT consult to assist with strengthening/mobility  - Encourage toileting schedule  Outcome: Progressing     Problem: GASTROINTESTINAL - ADULT  Goal: Minimal or absence of nausea and vomiting  Description: INTERVENTIONS:  - Maintain adequate hydration with IV or PO as ordered and tolerated  - Nasogastric tube to low intermittent suction as ordered  - Evaluate effectiveness of ordered antiemetic medications  - Provide nonpharmacologic comfort measures as appropriate  - Advance diet as tolerated, if ordered  - Obtain nutritional consult as needed  - Evaluate fluid balance  Outcome: Progressing     Problem: SKIN/TISSUE INTEGRITY - ADULT  Goal: Skin integrity remains intact  Description: INTERVENTIONS  - Assess and document risk factors for pressure ulcer development  - Assess and document skin integrity  - Monitor for areas of redness and/or skin breakdown  - Initiate interventions, skin care algorithm/standards of care as needed  Outcome: Progressing     Problem: NEUROLOGICAL - ADULT  Goal: Achieves stable or improved neurological status  Description: INTERVENTIONS  - Assess for and report changes in neurological status  - Initiate measures to prevent increased intracranial pressure  - Maintain blood pressure and fluid volume within ordered parameters to optimize cerebral perfusion and minimize risk of hemorrhage  - Monitor temperature, glucose, and sodium.  Initiate appropriate interventions as ordered  Outcome: Progressing     Problem: Impaired Functional Mobility  Goal: Achieve highest/safest level of mobility/gait  Description: Interventions:  - Assess patient's functional ability and stability  - Promote increasing activity/tolerance for mobility and gait  - Educate and engage patient/family in tolerated activity level and precautions  - Elevate right upper extremity  Outcome: Progressing

## 2023-10-27 NOTE — PHYSICAL THERAPY NOTE
PHYSICAL THERAPY EVALUATION - INPATIENT     Room Number: 328/328-A  Evaluation Date: 10/27/2023  Type of Evaluation: Initial   Physician Order: PT Eval and Treat    Presenting Problem: hepatic encephalopathy, confusion  Co-Morbidities : ETOH cirrhosis, history of ascites, HE, eso varices, CKD  Reason for Therapy: Mobility Dysfunction and Discharge Planning    PHYSICAL THERAPY ASSESSMENT     Patient is a 63year old male admitted 10/26/2023 for hepatic encephalopathy, confusion. Patient's current functional deficits include impaired bed mobility, transfers, gait, balance, left foot drop (baseline per patient), and tolerance for activity, which are below the patient's pre-admission status. Patient in bed upon arrival. RN approved activity. Educated patient on POC and benefits of mobilization. Agreeable to participate. Patient reporting chronic back pain, not quantified per the pain scale. Patient A&O x 3 this session. Patient reports that he always has someone at home to help him, caregiver assist with wife is working. Patient unable to tolerate sitting in bedside chair this session due to chronic back pain, insisting on returning back to bed after approx 3 minutes of sitting in bedside chair. Bed Mobility: Min A supine>EOB. Patient tolerated sitting EOB approx 6 minutes, requiring BUE support and CGA in order to maintain static sitting balance. CGA return to supine. Transfers: Min A sit<>stand with RW from EOB; cues provided for appropriate UE placement during functional transfers. Instruction on activity pacing upon standing to allow body time to acclimate to change in position. Tolerated static standing with BUE support on RW and CGA for approx 1 minute prior to mobilization. Mod A sit<>stand with RW from bedside chair for first trial. Min A sit<>stand with RW from bedside chair for second trial. Patient reports that he utilizes a reclining lift chair at home.    Ambulation: CGA with RW for 25 ft x 2; L Foot drag, shuffled steps, decreased hodan speed, decreased step length, flexed posture. Cues for safe management of RW with turns. Patient in bed at end of session. Needs in reach and alarm activated. RN aware. The patient's Approx Degree of Impairment: 54.16% has been calculated based on documentation in the [facility] '6 clicks' Inpatient Basic Mobility Short Form. Research supports that patients with this level of impairment may benefit from CHEO, however, patient receives assistance with all ADLs and mobility at baseline. Recommend home-health PT with initial 24 hour supervision/care in order to optimize functional independence. Patient will benefit from continued IP PT services to address these deficits in preparation for discharge. DISCHARGE RECOMMENDATIONS  PT Discharge Recommendations: Home with home health PT;24 hour care/supervision    PLAN  PT Treatment Plan: Bed mobility; Body mechanics; Coordination; Endurance; Energy conservation;Patient education;Gait training;Strengthening;Transfer training;Balance training  Rehab Potential : Good  Frequency (Obs): 5x/week       PHYSICAL THERAPY MEDICAL/SOCIAL HISTORY     Problem List  Principal Problem:    Hepatic encephalopathy ([address]      HOME SITUATION  Home Situation  Type of Home: House  Home Layout: Able to live on main level  Lives With: Spouse; Other (Comment) (CG 8am - 3pm)  Drives: No  Patient Owned Equipment: Rolling walker; Wheelchair     Prior Level of [place]: Assist from wife and caregiver with ADLs (toileting and LE dressing at bed level) and SBA with RW for functional mobility with wheelchair follow     SUBJECTIVE  \"My wife spoils me\"    PHYSICAL THERAPY EXAMINATION     OBJECTIVE  Precautions: Bed/chair alarm  Fall Risk: Standard fall risk    PAIN ASSESSMENT  Rating: Unable to rate  Location: chronic back pain  Management Techniques: Activity promotion; Body mechanics;Repositioning    COGNITION  Following Commands:  follows one step commands with repetition and follows one step commands consistently    RANGE OF MOTION AND STRENGTH ASSESSMENT  Upper extremity ROM and strength are within functional limits   Lower extremity ROM is within functional limits   Lower extremity strength is within functional limits except for Left DF approx 2+/5    BALANCE  Static Sitting: Good  Dynamic Sitting: Fair +  Static Standing: Fair  Dynamic Standing: Fair -    ACTIVITY TOLERANCE  Pulse: 92  Heart Rate Source: Monitor     BP: 126/74  BP Location: Right arm  BP Method: Automatic  Patient Position: Lying    O2 WALK  Oxygen Therapy  SPO2% on Room Air at Rest: 98    AM-PAC '6-Clicks' INPATIENT SHORT FORM - BASIC MOBILITY  How much difficulty does the patient currently have. .. Patient Difficulty: Turning over in bed (including adjusting bedclothes, sheets and blankets)?: A Little   Patient Difficulty: Sitting down on and standing up from a chair with arms (e.g., wheelchair, bedside commode, etc.): A Lot   Patient Difficulty: Moving from lying on back to sitting on the side of the bed?: A Little   How much help from another person does the patient currently need. ..    Help from Another: Moving to and from a bed to a chair (including a wheelchair)?: A Little   Help from Another: Need to walk in hospital room?: A Little   Help from Another: Climbing 3-5 steps with a railing?: A Lot     AM-PAC Score:  Raw Score: 16   Approx Degree of Impairment: 54.16%   Standardized Score (AM-PAC Scale): 40.78   CMS Modifier (G-Code): CK    FUNCTIONAL ABILITY STATUS  Functional Mobility/Gait Assessment  Gait Assistance: Contact guard assist  Distance (ft): 20 ft x 2  Assistive Device: Rolling walker  Pattern: L Foot drag;Shuffle (decreased hodan speed, decreased step length, flexed posture)    Exercise/Education Provided:  Bed mobility  Body mechanics  Energy conservation  Functional activity tolerated  Gait training  Posture  Transfer training    Patient End of Session: In bed;Needs met;Call light within reach;RN aware of session/findings; All patient questions and concerns addressed; Alarm set    CURRENT GOALS    Goals to be met by: 11/3/23  Patient Goal Patient's self-stated goal is: go home   Goal #1 Patient is able to demonstrate supine - sit EOB @ level: supervision     Goal #1   Current Status    Goal #2 Patient is able to demonstrate transfers Sit to/from Stand at assistance level: CGA with walker - rolling     Goal #2  Current Status    Goal #3 Patient is able to ambulate 100 feet with assist device: walker - rolling at assistance level: SBA   Goal #3   Current Status    Goal #4 Patient to demonstrate independence with home activity/exercise instructions provided to patient in preparation for discharge.    Goal #4   Current Status      Patient Evaluation Complexity Level:  History Moderate - 1 or 2 personal factors and/or co-morbidities   Examination of body systems Moderate - addressing a total of 3 or more elements   Clinical Presentation Moderate - Evolving   Clinical Decision Making Moderate Complexity       Gait Training: 10 minutes  Therapeutic Activity: 15 minutes

## 2023-10-28 VITALS
OXYGEN SATURATION: 95 % | HEART RATE: 84 BPM | DIASTOLIC BLOOD PRESSURE: 78 MMHG | SYSTOLIC BLOOD PRESSURE: 136 MMHG | BODY MASS INDEX: 27.65 KG/M2 | TEMPERATURE: 98 F | WEIGHT: 208.63 LBS | HEIGHT: 73 IN | RESPIRATION RATE: 18 BRPM

## 2023-10-28 PROBLEM — R41.0 DISORIENTATION: Status: RESOLVED | Noted: 2021-04-15 | Resolved: 2023-10-28

## 2023-10-28 PROBLEM — E72.20 HYPERAMMONEMIA (HCC): Status: RESOLVED | Noted: 2023-10-27 | Resolved: 2023-10-28

## 2023-10-28 PROBLEM — K76.82 HEPATIC ENCEPHALOPATHY (HCC): Status: RESOLVED | Noted: 2023-10-26 | Resolved: 2023-10-28

## 2023-10-28 LAB
ALBUMIN SERPL-MCNC: 3.5 G/DL (ref 3.4–5)
ALBUMIN/GLOB SERPL: 0.8 {RATIO} (ref 1–2)
ALP LIVER SERPL-CCNC: 124 U/L
ALT SERPL-CCNC: 27 U/L
AMMONIA PLAS-MCNC: 27 UMOL/L (ref 11–32)
ANION GAP SERPL CALC-SCNC: 8 MMOL/L (ref 0–18)
AST SERPL-CCNC: 30 U/L (ref 15–37)
BASOPHILS # BLD AUTO: 0.12 X10(3) UL (ref 0–0.2)
BASOPHILS NFR BLD AUTO: 1.4 %
BILIRUB SERPL-MCNC: 0.7 MG/DL (ref 0.1–2)
BUN BLD-MCNC: 30 MG/DL (ref 7–18)
BUN/CREAT SERPL: 22.2 (ref 10–20)
CALCIUM BLD-MCNC: 9.6 MG/DL (ref 8.5–10.1)
CHLORIDE SERPL-SCNC: 107 MMOL/L (ref 98–112)
CO2 SERPL-SCNC: 24 MMOL/L (ref 21–32)
CREAT BLD-MCNC: 1.35 MG/DL
DEPRECATED RDW RBC AUTO: 47.3 FL (ref 35.1–46.3)
EGFRCR SERPLBLD CKD-EPI 2021: 60 ML/MIN/1.73M2 (ref 60–?)
EOSINOPHIL # BLD AUTO: 0.23 X10(3) UL (ref 0–0.7)
EOSINOPHIL NFR BLD AUTO: 2.6 %
ERYTHROCYTE [DISTWIDTH] IN BLOOD BY AUTOMATED COUNT: 16.8 % (ref 11–15)
GLOBULIN PLAS-MCNC: 4.6 G/DL (ref 2.8–4.4)
GLUCOSE BLD-MCNC: 88 MG/DL (ref 70–99)
HCT VFR BLD AUTO: 39.2 %
HGB BLD-MCNC: 12.3 G/DL
IMM GRANULOCYTES # BLD AUTO: 0.03 X10(3) UL (ref 0–1)
IMM GRANULOCYTES NFR BLD: 0.3 %
LYMPHOCYTES # BLD AUTO: 2.84 X10(3) UL (ref 1–4)
LYMPHOCYTES NFR BLD AUTO: 32.3 %
MCH RBC QN AUTO: 24.6 PG (ref 26–34)
MCHC RBC AUTO-ENTMCNC: 31.4 G/DL (ref 31–37)
MCV RBC AUTO: 78.4 FL
MONOCYTES # BLD AUTO: 0.95 X10(3) UL (ref 0.1–1)
MONOCYTES NFR BLD AUTO: 10.8 %
NEUTROPHILS # BLD AUTO: 4.61 X10 (3) UL (ref 1.5–7.7)
NEUTROPHILS # BLD AUTO: 4.61 X10(3) UL (ref 1.5–7.7)
NEUTROPHILS NFR BLD AUTO: 52.6 %
OSMOLALITY SERPL CALC.SUM OF ELEC: 294 MOSM/KG (ref 275–295)
PLATELET # BLD AUTO: 195 10(3)UL (ref 150–450)
POTASSIUM SERPL-SCNC: 4 MMOL/L (ref 3.5–5.1)
PROT SERPL-MCNC: 8.1 G/DL (ref 6.4–8.2)
RBC # BLD AUTO: 5 X10(6)UL
SODIUM SERPL-SCNC: 139 MMOL/L (ref 136–145)
WBC # BLD AUTO: 8.8 X10(3) UL (ref 4–11)

## 2023-10-28 PROCEDURE — 99232 SBSQ HOSP IP/OBS MODERATE 35: CPT | Performed by: OTHER

## 2023-10-28 NOTE — PLAN OF CARE
Patient cleared for discharge. Discharge RN went over discharge paperwork with patient and wife at the beside. All questions addressed and answered. Tele and IV removed by this RN. All belongings sent with patient. Patient wheeled out by RN. Problem: Patient Centered Care  Goal: Patient preferences are identified and integrated in the patient's plan of care  Description: Interventions:  - What would you like us to know as we care for you? From home with wife  - Provide timely, complete, and accurate information to patient/family  - Incorporate patient and family knowledge, values, beliefs, and cultural backgrounds into the planning and delivery of care  - Encourage patient/family to participate in care and decision-making at the level they choose  - Honor patient and family perspectives and choices  Outcome: Completed     Problem: SAFETY ADULT - FALL  Goal: Free from fall injury  Description: INTERVENTIONS:  - Assess pt frequently for physical needs  - Identify cognitive and physical deficits and behaviors that affect risk of falls.   - [place] fall precautions as indicated by assessment.  - Educate pt/family on patient safety including physical limitations  - Instruct pt to call for assistance with activity based on assessment  - Modify environment to reduce risk of injury  - Provide assistive devices as appropriate  - Consider OT/PT consult to assist with strengthening/mobility  - Encourage toileting schedule  Outcome: Completed     Problem: GASTROINTESTINAL - ADULT  Goal: Minimal or absence of nausea and vomiting  Description: INTERVENTIONS:  - Maintain adequate hydration with IV or PO as ordered and tolerated  - Nasogastric tube to low intermittent suction as ordered  - Evaluate effectiveness of ordered antiemetic medications  - Provide nonpharmacologic comfort measures as appropriate  - Advance diet as tolerated, if ordered  - Obtain nutritional consult as needed  - Evaluate fluid balance  Outcome: Completed     Problem: SKIN/TISSUE INTEGRITY - ADULT  Goal: Skin integrity remains intact  Description: INTERVENTIONS  - Assess and document risk factors for pressure ulcer development  - Assess and document skin integrity  - Monitor for areas of redness and/or skin breakdown  - Initiate interventions, skin care algorithm/standards of care as needed  Outcome: Completed     Problem: NEUROLOGICAL - ADULT  Goal: Achieves stable or improved neurological status  Description: INTERVENTIONS  - Assess for and report changes in neurological status  - Initiate measures to prevent increased intracranial pressure  - Maintain blood pressure and fluid volume within ordered parameters to optimize cerebral perfusion and minimize risk of hemorrhage  - Monitor temperature, glucose, and sodium.  Initiate appropriate interventions as ordered  Outcome: Completed     Problem: Impaired Functional Mobility  Goal: Achieve highest/safest level of mobility/gait  Description: Interventions:  - Assess patient's functional ability and stability  - Promote increasing activity/tolerance for mobility and gait  - Educate and engage patient/family in tolerated activity level and precautions  - Recommend patient transfer to bedside chair toward strongest side  - When transferring patient, block weaker knee for safety  Outcome: Completed

## 2023-10-28 NOTE — CM/SW NOTE
10/28/23 1000   Discharge disposition   Expected discharge disposition Home-Health   Post Acute Care Provider   ([place])   Discharge transportation Private car     Pt discussed during nursing rounds. Pt is stable for PR today. MD PR order entered. [address] will provide RN and therapy services at [facility], agency notified of PR home today. Pt's spouse will provide transport at PR. Plan: Home w/spouse with [facility] today. / to remain available for support and/or discharge planning.      XIN Sprague    [phone]

## 2023-10-28 NOTE — DISCHARGE PLANNING
Patient was provided with discharge instructions, education, and follow up information. Patient's wife present for discharge instructions with patient's consent. No new prescriptions at time of discharge. Patient verbalizes understanding of follow up information, specifically PCP. Patient has no questions after reviewing all instructions and will be going home with home health.      Mita Calhoun, Discharge Leader [id number]

## 2023-10-28 NOTE — PLAN OF CARE
Problem: Patient Centered Care  Goal: Patient preferences are identified and integrated in the patient's plan of care  Description: Interventions:  - What would you like us to know as we care for you? From home with wife  - Provide timely, complete, and accurate information to patient/family  - Incorporate patient and family knowledge, values, beliefs, and cultural backgrounds into the planning and delivery of care  - Encourage patient/family to participate in care and decision-making at the level they choose  - Honor patient and family perspectives and choices  Outcome: Progressing     Problem: SAFETY ADULT - FALL  Goal: Free from fall injury  Description: INTERVENTIONS:  - Assess pt frequently for physical needs  - Identify cognitive and physical deficits and behaviors that affect risk of falls.   - [place] fall precautions as indicated by assessment.  - Educate pt/family on patient safety including physical limitations  - Instruct pt to call for assistance with activity based on assessment  - Modify environment to reduce risk of injury  - Provide assistive devices as appropriate  - Consider OT/PT consult to assist with strengthening/mobility  - Encourage toileting schedule  Outcome: Progressing     Problem: GASTROINTESTINAL - ADULT  Goal: Minimal or absence of nausea and vomiting  Description: INTERVENTIONS:  - Maintain adequate hydration with IV or PO as ordered and tolerated  - Nasogastric tube to low intermittent suction as ordered  - Evaluate effectiveness of ordered antiemetic medications  - Provide nonpharmacologic comfort measures as appropriate  - Advance diet as tolerated, if ordered  - Obtain nutritional consult as needed  - Evaluate fluid balance  Outcome: Progressing     Problem: SKIN/TISSUE INTEGRITY - ADULT  Goal: Skin integrity remains intact  Description: INTERVENTIONS  - Assess and document risk factors for pressure ulcer development  - Assess and document skin integrity  - Monitor for areas of redness and/or skin breakdown  - Initiate interventions, skin care algorithm/standards of care as needed  Outcome: Progressing     Problem: NEUROLOGICAL - ADULT  Goal: Achieves stable or improved neurological status  Description: INTERVENTIONS  - Assess for and report changes in neurological status  - Initiate measures to prevent increased intracranial pressure  - Maintain blood pressure and fluid volume within ordered parameters to optimize cerebral perfusion and minimize risk of hemorrhage  - Monitor temperature, glucose, and sodium.  Initiate appropriate interventions as ordered  Outcome: Progressing     Problem: Impaired Functional Mobility  Goal: Achieve highest/safest level of mobility/gait  Description: Interventions:  - Assess patient's functional ability and stability  - Promote increasing activity/tolerance for mobility and gait  - Educate and engage patient/family in tolerated activity level and precautions    Outcome: Progressing

## 2023-10-28 NOTE — PROCEDURES
EEG report    REFERRING PHYSICIAN: Mendel Haver, MD    PCP and phone number:  Fidelina Parker  [phone]    TECHNIQUE: 21 channels of EEG, 2 channels of EOG, and 1 channel of EKG were recorded utilizing the [employer] 10/20 System. The recording was performed in a digitized monopolar referential format and playback was reformatted into various referential and bipolar montages utilizing appropriate filter settings. Automatic seizure and spike detection programs were utilized throughout the recording. Video was not recorded during the study    CLINICAL DATA:  Patient is sent for the evaluation of possible seizures. MEDICATION:  Continuous Medications:      Scheduled Medications:  No current outpatient medications on file. PRN Medications:  acetaminophen, ondansetron, prochlorperazine    ACTIVATION:  Hyperventilation: Not done  Photic stimulation: Not done  Sleep: Normal sleep architecture was seen. BACKGROUND  While the patient was awake, the posterior dominant rhythm consisted of poorly-regulated 6-7 Hz rhythmic waveforms, symmetrically distributed over both posterior quadrants and was reactive to eye opening. EEG ABNORMALITY  None    IMPRESSION:  This is a slightly abnormal EEG (PDR was slower than normal) indicating some mild degree of encephalopathy. No focal, lateralized, or epileptiform features are noted. Clinical correlation required.

## 2023-11-06 ENCOUNTER — HOSPITAL ENCOUNTER (OUTPATIENT)
Dept: ULTRASOUND IMAGING | Facility: HOSPITAL | Age: 63
Discharge: HOME | End: 2023-11-06
Attending: INTERNAL MEDICINE
Payer: COMMERCIAL

## 2023-11-06 DIAGNOSIS — K70.31 ASCITES DUE TO ALCOHOLIC CIRRHOSIS (HCC): ICD-10-CM

## 2023-11-06 PROCEDURE — 76705 ECHO EXAM OF ABDOMEN: CPT | Performed by: INTERNAL MEDICINE

## 2023-12-09 ENCOUNTER — HOSPITAL ENCOUNTER (INPATIENT)
Facility: HOSPITAL | Age: 63
LOS: 1 days | Discharge: HOME | End: 2023-12-10
Attending: STUDENT IN AN ORGANIZED HEALTH CARE EDUCATION/TRAINING PROGRAM | Admitting: HOSPITALIST
Payer: COMMERCIAL

## 2023-12-09 DIAGNOSIS — N18.9 ACUTE KIDNEY INJURY SUPERIMPOSED ON CKD  (HCC): ICD-10-CM

## 2023-12-09 DIAGNOSIS — E87.1 HYPONATREMIA: ICD-10-CM

## 2023-12-09 DIAGNOSIS — K70.30 ALCOHOLIC CIRRHOSIS, UNSPECIFIED WHETHER ASCITES PRESENT (HCC): ICD-10-CM

## 2023-12-09 DIAGNOSIS — N17.9 ACUTE KIDNEY INJURY SUPERIMPOSED ON CKD  (HCC): ICD-10-CM

## 2023-12-09 DIAGNOSIS — R73.9 HYPERGLYCEMIA: Primary | ICD-10-CM

## 2023-12-09 LAB
ALBUMIN SERPL-MCNC: 4 G/DL (ref 3.2–4.8)
ALP LIVER SERPL-CCNC: 172 U/L
ALT SERPL-CCNC: 30 U/L
ANION GAP SERPL CALC-SCNC: 7 MMOL/L (ref 0–18)
AST SERPL-CCNC: 37 U/L (ref ?–34)
BASE EXCESS BLD CALC-SCNC: 2.4 MMOL/L (ref ?–2)
BASOPHILS # BLD AUTO: 0.1 X10(3) UL (ref 0–0.2)
BASOPHILS NFR BLD AUTO: 0.9 %
BILIRUB DIRECT SERPL-MCNC: 0.4 MG/DL (ref ?–0.3)
BILIRUB SERPL-MCNC: 1.1 MG/DL (ref 0.2–1.1)
BILIRUB UR QL: NEGATIVE
BUN BLD-MCNC: 31 MG/DL (ref 9–23)
BUN/CREAT SERPL: 17.9 (ref 10–20)
CALCIUM BLD-MCNC: 9.9 MG/DL (ref 8.7–10.4)
CHLORIDE SERPL-SCNC: 92 MMOL/L (ref 98–112)
CLARITY UR: CLEAR
CO2 SERPL-SCNC: 27 MMOL/L (ref 21–32)
CREAT BLD-MCNC: 1.73 MG/DL
DEPRECATED RDW RBC AUTO: 41.8 FL (ref 35.1–46.3)
EGFRCR SERPLBLD CKD-EPI 2021: 45 ML/MIN/1.73M2 (ref 60–?)
EOSINOPHIL # BLD AUTO: 0.18 X10(3) UL (ref 0–0.7)
EOSINOPHIL NFR BLD AUTO: 1.5 %
ERYTHROCYTE [DISTWIDTH] IN BLOOD BY AUTOMATED COUNT: 15 % (ref 11–15)
GLUCOSE BLD-MCNC: 475 MG/DL (ref 70–99)
GLUCOSE BLDC GLUCOMTR-MCNC: 340 MG/DL (ref 70–99)
GLUCOSE BLDC GLUCOMTR-MCNC: 402 MG/DL (ref 70–99)
GLUCOSE BLDC GLUCOMTR-MCNC: 438 MG/DL (ref 70–99)
GLUCOSE BLDC GLUCOMTR-MCNC: 512 MG/DL (ref 70–99)
GLUCOSE BLDC GLUCOMTR-MCNC: 542 MG/DL (ref 70–99)
GLUCOSE UR-MCNC: >1000 MG/DL
HCO3 BLDV-SCNC: 26.5 MEQ/L (ref 22–26)
HCT VFR BLD AUTO: 38.1 %
HGB BLD-MCNC: 13 G/DL
HGB UR QL STRIP.AUTO: NEGATIVE
IMM GRANULOCYTES # BLD AUTO: 0.03 X10(3) UL (ref 0–1)
IMM GRANULOCYTES NFR BLD: 0.3 %
KETONES UR-MCNC: NEGATIVE MG/DL
LEUKOCYTE ESTERASE UR QL STRIP.AUTO: NEGATIVE
LYMPHOCYTES # BLD AUTO: 2.89 X10(3) UL (ref 1–4)
LYMPHOCYTES NFR BLD AUTO: 24.6 %
MAGNESIUM SERPL-MCNC: 1.6 MG/DL (ref 1.6–2.6)
MCH RBC QN AUTO: 26.3 PG (ref 26–34)
MCHC RBC AUTO-ENTMCNC: 34.1 G/DL (ref 31–37)
MCV RBC AUTO: 77 FL
MONOCYTES # BLD AUTO: 1.18 X10(3) UL (ref 0.1–1)
MONOCYTES NFR BLD AUTO: 10.1 %
NEUTROPHILS # BLD AUTO: 7.35 X10 (3) UL (ref 1.5–7.7)
NEUTROPHILS # BLD AUTO: 7.35 X10(3) UL (ref 1.5–7.7)
NEUTROPHILS NFR BLD AUTO: 62.6 %
NITRITE UR QL STRIP.AUTO: NEGATIVE
OSMOLALITY SERPL CALC.SUM OF ELEC: 289 MOSM/KG (ref 275–295)
PCO2 BLDV: 42 MM HG (ref 38–50)
PH BLDV: 7.42 [PH] (ref 7.32–7.43)
PH UR: 5 [PH] (ref 5–8)
PLATELET # BLD AUTO: 175 10(3)UL (ref 150–450)
PO2 BLDV: 53 MM HG (ref 35–40)
POTASSIUM SERPL-SCNC: 4.7 MMOL/L (ref 3.5–5.1)
PROT SERPL-MCNC: 7.6 G/DL (ref 5.7–8.2)
PROT UR-MCNC: NEGATIVE MG/DL
PUNCTURE CHARGE: NO
RBC # BLD AUTO: 4.95 X10(6)UL
SAO2 % BLDV: 87.4 % (ref 60–85)
SODIUM SERPL-SCNC: 126 MMOL/L (ref 136–145)
SP GR UR STRIP: 1.02 (ref 1–1.03)
UROBILINOGEN UR STRIP-ACNC: NORMAL
WBC # BLD AUTO: 11.7 X10(3) UL (ref 4–11)

## 2023-12-09 RX ORDER — MAGNESIUM OXIDE 400 MG/1
400 TABLET ORAL 2 TIMES DAILY
Status: DISCONTINUED | OUTPATIENT
Start: 2023-12-09 | End: 2023-12-10

## 2023-12-09 RX ORDER — LACTULOSE 10 G/15ML
10 SOLUTION ORAL DAILY
Status: DISCONTINUED | OUTPATIENT
Start: 2023-12-10 | End: 2023-12-10

## 2023-12-09 RX ORDER — SODIUM CHLORIDE 9 MG/ML
INJECTION, SOLUTION INTRAVENOUS CONTINUOUS
Status: DISCONTINUED | OUTPATIENT
Start: 2023-12-09 | End: 2023-12-10

## 2023-12-09 RX ORDER — BISACODYL 10 MG
10 SUPPOSITORY, RECTAL RECTAL
Status: DISCONTINUED | OUTPATIENT
Start: 2023-12-09 | End: 2023-12-10

## 2023-12-09 RX ORDER — MELATONIN
100 DAILY
Status: DISCONTINUED | OUTPATIENT
Start: 2023-12-10 | End: 2023-12-10

## 2023-12-09 RX ORDER — MAGNESIUM OXIDE 400 MG/1
400 TABLET ORAL ONCE
Status: DISCONTINUED | OUTPATIENT
Start: 2023-12-09 | End: 2023-12-10

## 2023-12-09 RX ORDER — INSULIN ASPART 100 [IU]/ML
7 INJECTION, SOLUTION INTRAVENOUS; SUBCUTANEOUS ONCE
Status: COMPLETED | OUTPATIENT
Start: 2023-12-09 | End: 2023-12-09

## 2023-12-09 RX ORDER — POLYETHYLENE GLYCOL 3350 17 G/17G
17 POWDER, FOR SOLUTION ORAL DAILY PRN
Status: DISCONTINUED | OUTPATIENT
Start: 2023-12-09 | End: 2023-12-10

## 2023-12-09 RX ORDER — PANTOPRAZOLE SODIUM 40 MG/1
40 TABLET, DELAYED RELEASE ORAL DAILY
Status: DISCONTINUED | OUTPATIENT
Start: 2023-12-09 | End: 2023-12-10

## 2023-12-09 RX ORDER — FOLIC ACID 1 MG/1
1 TABLET ORAL DAILY
Status: DISCONTINUED | OUTPATIENT
Start: 2023-12-10 | End: 2023-12-10

## 2023-12-09 RX ORDER — ONDANSETRON 2 MG/ML
4 INJECTION INTRAMUSCULAR; INTRAVENOUS EVERY 6 HOURS PRN
Status: DISCONTINUED | OUTPATIENT
Start: 2023-12-09 | End: 2023-12-10

## 2023-12-09 RX ORDER — SENNOSIDES 8.6 MG
17.2 TABLET ORAL NIGHTLY PRN
Status: DISCONTINUED | OUTPATIENT
Start: 2023-12-09 | End: 2023-12-10

## 2023-12-09 RX ORDER — NICOTINE POLACRILEX 4 MG
15 LOZENGE BUCCAL
Status: DISCONTINUED | OUTPATIENT
Start: 2023-12-09 | End: 2023-12-10

## 2023-12-09 RX ORDER — ATORVASTATIN CALCIUM 40 MG/1
40 TABLET, FILM COATED ORAL NIGHTLY
Status: DISCONTINUED | OUTPATIENT
Start: 2023-12-09 | End: 2023-12-10

## 2023-12-09 RX ORDER — MORPHINE SULFATE 2 MG/ML
2 INJECTION, SOLUTION INTRAMUSCULAR; INTRAVENOUS ONCE
Status: COMPLETED | OUTPATIENT
Start: 2023-12-09 | End: 2023-12-09

## 2023-12-09 RX ORDER — ACETAMINOPHEN 500 MG
500 TABLET ORAL EVERY 8 HOURS PRN
Status: DISCONTINUED | OUTPATIENT
Start: 2023-12-09 | End: 2023-12-10

## 2023-12-09 RX ORDER — GABAPENTIN 300 MG/1
300 CAPSULE ORAL 2 TIMES DAILY
Status: DISCONTINUED | OUTPATIENT
Start: 2023-12-09 | End: 2023-12-10

## 2023-12-09 RX ORDER — MELATONIN
325
Status: DISCONTINUED | OUTPATIENT
Start: 2023-12-10 | End: 2023-12-10

## 2023-12-09 RX ORDER — DEXTROSE MONOHYDRATE 25 G/50ML
50 INJECTION, SOLUTION INTRAVENOUS
Status: DISCONTINUED | OUTPATIENT
Start: 2023-12-09 | End: 2023-12-10

## 2023-12-09 RX ORDER — NICOTINE POLACRILEX 4 MG
30 LOZENGE BUCCAL
Status: DISCONTINUED | OUTPATIENT
Start: 2023-12-09 | End: 2023-12-10

## 2023-12-09 RX ORDER — HEPARIN SODIUM 5000 [USP'U]/ML
5000 INJECTION, SOLUTION INTRAVENOUS; SUBCUTANEOUS EVERY 8 HOURS SCHEDULED
Status: DISCONTINUED | OUTPATIENT
Start: 2023-12-09 | End: 2023-12-10

## 2023-12-09 RX ORDER — MIDODRINE HYDROCHLORIDE 5 MG/1
10 TABLET ORAL
Status: DISCONTINUED | OUTPATIENT
Start: 2023-12-10 | End: 2023-12-10

## 2023-12-09 RX ORDER — SUCRALFATE ORAL 1 G/10ML
1 SUSPENSION ORAL
Status: DISCONTINUED | OUTPATIENT
Start: 2023-12-10 | End: 2023-12-10

## 2023-12-09 NOTE — ED QUICK NOTES
Patient able to stand and pivot with assist from wheelchair to cart (normally uses walker)    Spouse states patient had labs drawn today for hepatology appointment Monday (history ascites), was called back due to hyperglycemia.       Has been off diabetic medications \"for a while\" as sugars have been under control, currently on amoxicillin for tooth infection

## 2023-12-09 NOTE — ED QUICK NOTES
Orders for admission, patient is aware of plan and ready to go upstairs. Any questions, please call ED RN Hoa at extension [phone].      Patient Covid vaccination status: Unvaccinated     COVID Test Ordered in ED: None    COVID Suspicion at Admission: N/A    Running Infusions:    sodium chloride          Mental Status/LOC at time of transport: AO x4    Other pertinent information:   CIWA score: N/A   NIH score:  N/A

## 2023-12-09 NOTE — ED INITIAL ASSESSMENT (HPI)
Pt presents from home via EMS for c/o elevated blood sugar to over 500 at home. EMS noted sugar to be 386 s/p 200cc fluid bolus.  in triage.

## 2023-12-10 ENCOUNTER — TELEPHONE (OUTPATIENT)
Dept: ENDOCRINOLOGY CLINIC | Facility: CLINIC | Age: 63
End: 2023-12-10

## 2023-12-10 VITALS
HEIGHT: 72 IN | TEMPERATURE: 98 F | SYSTOLIC BLOOD PRESSURE: 138 MMHG | OXYGEN SATURATION: 96 % | HEART RATE: 82 BPM | WEIGHT: 207.38 LBS | RESPIRATION RATE: 18 BRPM | BODY MASS INDEX: 28.09 KG/M2 | DIASTOLIC BLOOD PRESSURE: 87 MMHG

## 2023-12-10 LAB
ANION GAP SERPL CALC-SCNC: 5 MMOL/L (ref 0–18)
BASOPHILS # BLD AUTO: 0.08 X10(3) UL (ref 0–0.2)
BASOPHILS NFR BLD AUTO: 0.9 %
BUN BLD-MCNC: 20 MG/DL (ref 9–23)
BUN/CREAT SERPL: 16.5 (ref 10–20)
CALCIUM BLD-MCNC: 9.5 MG/DL (ref 8.7–10.4)
CHLORIDE SERPL-SCNC: 102 MMOL/L (ref 98–112)
CO2 SERPL-SCNC: 26 MMOL/L (ref 21–32)
CREAT BLD-MCNC: 1.21 MG/DL
DEPRECATED RDW RBC AUTO: 43.5 FL (ref 35.1–46.3)
EGFRCR SERPLBLD CKD-EPI 2021: 69 ML/MIN/1.73M2 (ref 60–?)
EOSINOPHIL # BLD AUTO: 0.2 X10(3) UL (ref 0–0.7)
EOSINOPHIL NFR BLD AUTO: 2.2 %
ERYTHROCYTE [DISTWIDTH] IN BLOOD BY AUTOMATED COUNT: 15.2 % (ref 11–15)
EST. AVERAGE GLUCOSE BLD GHB EST-MCNC: 223 MG/DL (ref 68–126)
GLUCOSE BLD-MCNC: 198 MG/DL (ref 70–99)
GLUCOSE BLDC GLUCOMTR-MCNC: 177 MG/DL (ref 70–99)
GLUCOSE BLDC GLUCOMTR-MCNC: 235 MG/DL (ref 70–99)
GLUCOSE BLDC GLUCOMTR-MCNC: 306 MG/DL (ref 70–99)
HBA1C MFR BLD: 9.4 % (ref ?–5.7)
HCT VFR BLD AUTO: 36.7 %
HGB BLD-MCNC: 11.9 G/DL
IMM GRANULOCYTES # BLD AUTO: 0.03 X10(3) UL (ref 0–1)
IMM GRANULOCYTES NFR BLD: 0.3 %
LYMPHOCYTES # BLD AUTO: 2.64 X10(3) UL (ref 1–4)
LYMPHOCYTES NFR BLD AUTO: 29.1 %
MAGNESIUM SERPL-MCNC: 1.7 MG/DL (ref 1.6–2.6)
MCH RBC QN AUTO: 25.6 PG (ref 26–34)
MCHC RBC AUTO-ENTMCNC: 32.4 G/DL (ref 31–37)
MCV RBC AUTO: 78.9 FL
MONOCYTES # BLD AUTO: 0.89 X10(3) UL (ref 0.1–1)
MONOCYTES NFR BLD AUTO: 9.8 %
NEUTROPHILS # BLD AUTO: 5.22 X10 (3) UL (ref 1.5–7.7)
NEUTROPHILS # BLD AUTO: 5.22 X10(3) UL (ref 1.5–7.7)
NEUTROPHILS NFR BLD AUTO: 57.7 %
OSMOLALITY SERPL CALC.SUM OF ELEC: 284 MOSM/KG (ref 275–295)
PLATELET # BLD AUTO: 174 10(3)UL (ref 150–450)
POTASSIUM SERPL-SCNC: 4.1 MMOL/L (ref 3.5–5.1)
RBC # BLD AUTO: 4.65 X10(6)UL
SODIUM SERPL-SCNC: 133 MMOL/L (ref 136–145)
WBC # BLD AUTO: 9.1 X10(3) UL (ref 4–11)

## 2023-12-10 PROCEDURE — 99222 1ST HOSP IP/OBS MODERATE 55: CPT | Performed by: INTERNAL MEDICINE

## 2023-12-10 RX ORDER — AMOXICILLIN 875 MG/1
875 TABLET, COATED ORAL 2 TIMES DAILY
COMMUNITY
Start: 2023-12-09 | End: 2023-12-15

## 2023-12-10 NOTE — PLAN OF CARE
Problem: Patient Centered Care  Goal: Patient preferences are identified and integrated in the patient's plan of care  Description: Interventions:  - What would you like us to know as we care for you?  Live with my wife    - Provide timely, complete, and accurate information to patient/family  - Incorporate patient and family knowledge, values, beliefs, and cultural backgrounds into the planning and delivery of care  - Encourage patient/family to participate in care and decision-making at the level they choose  - Honor patient and family perspectives and choices  Outcome: Progressing     Problem: Diabetes/Glucose Control  Goal: Glucose maintained within prescribed range  Description: INTERVENTIONS:  - Monitor Blood Glucose as ordered  - Assess for signs and symptoms of hyperglycemia and hypoglycemia  - Administer ordered medications to maintain glucose within target range  - Assess barriers to adequate nutritional intake and initiate nutrition consult as needed  - Instruct patient on self management of diabetes  Outcome: Progressing     Problem: Patient/Family Goals  Goal: Patient/Family Long Term Goal  Description: Patient's Long Term Goal: go home    Interventions:  -follow md orders  -administer medication  -monitor labs vital signs  - See additional Care Plan goals for specific interventions  Outcome: Progressing  Goal: Patient/Family Short Term Goal  Description: Patient's Short Term Goal: feel better    Interventions:   - follow md orders  -administer medication  - monitor labs/ vital signs  - See additional Care Plan goals for specific interventions  Outcome: Progressing     Problem: PAIN - ADULT  Goal: Verbalizes/displays adequate comfort level or patient's stated pain goal  Description: INTERVENTIONS:  - Encourage pt to monitor pain and request assistance  - Assess pain using appropriate pain scale  - Administer analgesics based on type and severity of pain and evaluate response  - Implement non-pharmacological measures as appropriate and evaluate response  - Consider cultural and social influences on pain and pain management  - Manage/alleviate anxiety  - Utilize distraction and/or relaxation techniques  - Monitor for opioid side effects  - Notify MD/LIP if interventions unsuccessful or patient reports new pain  - Anticipate increased pain with activity and pre-medicate as appropriate  Outcome: Progressing     Problem: SAFETY ADULT - FALL  Goal: Free from fall injury  Description: INTERVENTIONS:  - Assess pt frequently for physical needs  - Identify cognitive and physical deficits and behaviors that affect risk of falls.   - [place] fall precautions as indicated by assessment.  - Educate pt/family on patient safety including physical limitations  - Instruct pt to call for assistance with activity based on assessment  - Modify environment to reduce risk of injury  - Provide assistive devices as appropriate  - Consider OT/PT consult to assist with strengthening/mobility  - Encourage toileting schedule  Outcome: Progressing     Problem: DISCHARGE PLANNING  Goal: Discharge to home or other facility with appropriate resources  Description: INTERVENTIONS:  - Identify barriers to discharge w/pt and caregiver  - Include patient/family/discharge partner in discharge planning  - Arrange for needed discharge resources and transportation as appropriate  - Identify discharge learning needs (meds, wound care, etc)  - Arrange for interpreters to assist at discharge as needed  - Consider post-discharge preferences of patient/family/discharge partner  - Complete POLST form as appropriate  - Assess patient's ability to be responsible for managing their own health  - Refer to Case Management Department for coordinating discharge planning if the patient needs post-hospital services based on physician/LIP order or complex needs related to functional status, cognitive ability or social support system  Outcome: Progressing    Patient arrived to unit from ED, vital signs stable, monitoring blood glucose as per orders, bed locked and in lowest position, call light within reach, all needs met at this time, plan of care ongoing.

## 2023-12-10 NOTE — DISCHARGE INSTRUCTIONS
Check sugars before breakfast and dinner    Please call PCP if Blood sugars consistently > 250, or < 70    Please follow up with PCP and endocrine in 1 week    Ozempic 0.25mg weekly starting tonight

## 2023-12-10 NOTE — PLAN OF CARE
Patient appropriate for discharge per MD. No acute changes. PIV removed. Paperwork and belongings sent with patient and wife. Problem: Patient Centered Care  Goal: Patient preferences are identified and integrated in the patient's plan of care  Description: Interventions:  - What would you like us to know as we care for you?  Live with my wife    - Provide timely, complete, and accurate information to patient/family  - Incorporate patient and family knowledge, values, beliefs, and cultural backgrounds into the planning and delivery of care  - Encourage patient/family to participate in care and decision-making at the level they choose  - Honor patient and family perspectives and choices  Outcome: Adequate for Discharge     Problem: Diabetes/Glucose Control  Goal: Glucose maintained within prescribed range  Description: INTERVENTIONS:  - Monitor Blood Glucose as ordered  - Assess for signs and symptoms of hyperglycemia and hypoglycemia  - Administer ordered medications to maintain glucose within target range  - Assess barriers to adequate nutritional intake and initiate nutrition consult as needed  - Instruct patient on self management of diabetes  Outcome: Adequate for Discharge     Problem: Patient/Family Goals  Goal: Patient/Family Long Term Goal  Description: Patient's Long Term Goal: go home    Interventions:  -follow md orders  -administer medication  -monitor labs vital signs  - See additional Care Plan goals for specific interventions  Outcome: Adequate for Discharge  Goal: Patient/Family Short Term Goal  Description: Patient's Short Term Goal: feel better    Interventions:   - follow md orders  -administer medication  - monitor labs/ vital signs  - See additional Care Plan goals for specific interventions  Outcome: Adequate for Discharge     Problem: PAIN - ADULT  Goal: Verbalizes/displays adequate comfort level or patient's stated pain goal  Description: INTERVENTIONS:  - Encourage pt to monitor pain and request assistance  - Assess pain using appropriate pain scale  - Administer analgesics based on type and severity of pain and evaluate response  - Implement non-pharmacological measures as appropriate and evaluate response  - Consider cultural and social influences on pain and pain management  - Manage/alleviate anxiety  - Utilize distraction and/or relaxation techniques  - Monitor for opioid side effects  - Notify MD/LIP if interventions unsuccessful or patient reports new pain  - Anticipate increased pain with activity and pre-medicate as appropriate  Outcome: Adequate for Discharge     Problem: SAFETY ADULT - FALL  Goal: Free from fall injury  Description: INTERVENTIONS:  - Assess pt frequently for physical needs  - Identify cognitive and physical deficits and behaviors that affect risk of falls.   - [place] fall precautions as indicated by assessment.  - Educate pt/family on patient safety including physical limitations  - Instruct pt to call for assistance with activity based on assessment  - Modify environment to reduce risk of injury  - Provide assistive devices as appropriate  - Consider OT/PT consult to assist with strengthening/mobility  - Encourage toileting schedule  Outcome: Adequate for Discharge     Problem: DISCHARGE PLANNING  Goal: Discharge to home or other facility with appropriate resources  Description: INTERVENTIONS:  - Identify barriers to discharge w/pt and caregiver  - Include patient/family/discharge partner in discharge planning  - Arrange for needed discharge resources and transportation as appropriate  - Identify discharge learning needs (meds, wound care, etc)  - Arrange for interpreters to assist at discharge as needed  - Consider post-discharge preferences of patient/family/discharge partner  - Complete POLST form as appropriate  - Assess patient's ability to be responsible for managing their own health  - Refer to Case Management Department for coordinating discharge planning if the patient needs post-hospital services based on physician/LIP order or complex needs related to functional status, cognitive ability or social support system  Outcome: Adequate for Discharge

## 2023-12-11 NOTE — TELEPHONE ENCOUNTER
Noted since sugars are coming down ( compared to hospital ) please FU with PCP in one week as discussed  Thanks

## 2023-12-11 NOTE — TELEPHONE ENCOUNTER
Patient was discharged on MTF  mg Bf and dinner and ozempci 0.25 mg weekly   Please FU on BG   Thanks

## 2023-12-11 NOTE — TELEPHONE ENCOUNTER
Dr. Emely Bunn,  Update from patient:  When we got home last night his blood sugar was 242. I gave him Ozempic and metformin.  I just took it again this morning and it was 192     Please advise -thanks

## 2023-12-13 NOTE — PAYOR COMM NOTE
--------------  DISCHARGE REVIEW    Payor: Brisa Wharton 150 PPO  Subscriber #:  [id number]  Authorization Number: [id number]    Admit date: 12/9/23  Admit time:   8:53 PM  Discharge Date: 12/10/2023  5:33 PM     Admitting Physician: Karen Gonzalez MD  Attending Physician:  No att. providers found  Primary Care Physician: Jeremias Tyson          Discharge Summary Notes        Discharge Summary signed by Karen Gonzalez MD at 12/10/2023  4:32 PM       Author: Karen Gonzalez MD Specialty: HOSPITALIST, Internal Medicine Author Type: Physician    Filed: 12/10/2023  4:32 PM Date of Service: 12/10/2023  4:28 PM Status: Signed    : Karen Gonzalez MD (Physician)           General Medicine Discharge Summary     Patient ID:  Owen Flores  63year old  12/17/1960    Admit date: 12/9/2023    Discharge date and time: 12/10/2023    Attending Physician: Karen Gonzalez MD     Consults: IP CONSULT TO HOSPITALIST  IP CONSULT TO ENDOCRINOLOGY  [address] [place] [address] CONSULT TO DIABETES EDUCATION  NURSING CONSULT TO DIETITIAN    Primary Care Physician: Jayleen Crowley     Reason for admission: hyperglycemia     Risk For Readmission: low    Discharge Diagnoses: Hyponatremia [E87.1]  Hyperglycemia [R73.9]  Acute kidney injury superimposed on CKD  [A61.4, Y03.4]  Alcoholic cirrhosis, unspecified whether ascites present ([facility] [address] [K70.30]  See Additional Discharge Diagnoses in Hospital Course    Discharged Condition: good    Follow-up with labs/images appointments:   Check sugars before breakfast and dinner    Please call PCP if Blood sugars consistently > 250, or < 70    Please follow up with PCP and endocrine in 1 week    Ozempic 0.25mg weekly starting tonight    Exam  Gen: No acute distress  Pulm: Lungs clear, normal respiratory effort  CV: Heart with regular rate and rhythm  Abd: Abdomen soft,   EXT: no edema     HPI:   History of Present Illness: Mr. Seymour Terrell is a 63year old male with PMH sig for ETOH cirrhosis, hs ascites and hepatic encephalopathy w/ esophageal varices, CKD 3, past ETOH use, type 2 DM recently taken off meds, now presents with Hyperglycemia, BS in the 500's. Patient states he had routine lab work done today per his hepatologist, and was noted to have a blood sugar in the 500s, and told to come to the ER. He states he has been feeling fairly well, except for generalized fatigue, also notes some polydipsia, and polyuria. No chest pain or shortness of breath, no fever or chills, no headaches or vision changes. He does state that he has been treated for tooth infection with antibiotics recently. Hospital Course:   Mr. Lenny Ewing is a 63year old male with PMH sig for ETOH cirrhosis, hs ascites and hepatic encephalopathy w/ esophageal varices, CKD 3, past ETOH use, type 2 DM recently taken off meds, now presents with Hyperglycemia, BS in the 500's, insulin started with improvement, A1c 9.4, endo consulted, recommended Metformin and ozempic on DC, close fu and close monitoring of BS on DC. Cleared by endo for DC, fu with PCP and endo on DC. No chest pain or sob, n/v prior to DC.          Type 2 diabetes  Hyperglycemia  HHS  -No signs of DKA, blood sugars in the 500s  -Was on medications for diabetes but weaned off about 6 months to 1 year ago  -Has been drinking Gatorade regularly  -Also with active tooth infection  -A1c 9.4  -Endocrinology consulted recommending metformin on DC and ozempic  -fu with ENDO and PCP in 1 week, check BS regularly      SADA on CKD stage III  -Baseline creatinine 1.3, creatinine on admission 1.7  -cre improved, 1.2, stop IVF     Pseudohyponatremia  -Sodium 126 likely pseudohyponatremia secondary to hyperglycemia  -Corrected sodium was 135  -NA improved 841     Alcoholic cirrhosis  C/b hepatic encephalopathy, ascites, EV  -Follows with hepatology at [facility]  -Continue lactulose and rifaximin  -Continue folic acid and thiamine  -Continue will follow-up at [facility]     GERD  -Continue PPI     microcytic anemia  -Stable     HLD  -Statin on discharge    Operative Procedures:      Imaging: No results found. Disposition: home    Activity: activity as tolerated  Diet: diabetic diet  Wound Care: as directed  Code Status: Full Code      Home Medication Changes:     Med list     Medication List        START taking these medications      metFORMIN 500 MG Tabs  Commonly known as: Glucophage  Take 1 tablet (500 mg total) by mouth 2 (two) times daily with meals. CONTINUE taking these medications      amoxicillin 875 MG Tabs  Commonly known as: Amoxil     atorvastatin 40 MG Tabs  Commonly known as: Lipitor     citalopram 10 MG Tabs  Commonly known as: CeleXA     ferrous sulfate 325 (65 FE) MG Tbec     folic acid 1 MG Tabs  Commonly known as: Folvite  Take 1 tablet (1 mg total) by mouth daily. furosemide 40 MG Tabs  Commonly known as: Lasix  Take 0.5 tablets (20 mg total) by mouth daily. gabapentin 300 MG Caps  Commonly known as: Neurontin     lactulose 10 GM/15ML Soln  Commonly known as: CHRONULAC     magnesium oxide 400 MG Tabs  Commonly known as: Mag-Ox  Take 1 tablet (400 mg total) by mouth 2 (two) times daily. midodrine 10 MG Tabs  Commonly known as: ProAmatine     pantoprazole 40 MG Tbec  Commonly known as: Protonix     rifAXIMin 200 MG Tabs  Commonly known as: Xifaxan     spironolactone 25 MG Tabs  Commonly known as: Aldactone  Take 1 tablet (25 mg total) by mouth daily. sucralfate 1 GM/10ML Susp  Commonly known as: Carafate     thiamine 100 MG Tabs  Commonly known as: Vitamin B1  Take 1 tablet (100 mg total) by mouth daily. Where to Get Your Medications        [facility], [address]  [address] [facility], [phone], [address], JEAN [address] [address]      Hours: 24-hours Phone: [phone]   metFORMIN 500 MG Tabs         FU   Follow-up Information       Daina Stanton.  Schedule an appointment as soon as possible for a visit in 1 week(s). Specialty: [facility] information:  FAMILY [facility] [place]  [address] [address] [phone]               [address] an appointment as soon as possible for a visit in 1 week(s). Specialty: Hepatology  Contact information:  [phone] [facility] DELFINOSusie Mathur MD. Schedule an appointment as soon as possible for a visit in 1 week(s). Specialty: ENDOCRINOLOGY  Contact information:  [address]  [phone]                             ME instructions: Other Discharge Instructions:         Check sugars before breakfast and dinner    Please call PCP if Blood sugars consistently > 250, or < 70    Please follow up with PCP and endocrine in 1 week    Ozempic 0.25mg weekly starting tonight            I reconciled current and discharge medications on day of discharge, discussed changes with patient and noted changes above.        Total Time Coordinating Care: Greater than 30 minutes    Patient had opportunity to ask questions and state understand and agree with therapeutic plan as outlined    Thank You,    Ronaldo Gamino MD   Hospitalist with [facility]         Electronically signed by Bear Díaz MD on 12/10/2023  4:32 PM         REVIEWER COMMENTS

## 2023-12-14 ENCOUNTER — PATIENT OUTREACH (OUTPATIENT)
Dept: INTERNAL MEDICINE CLINIC | Facility: CLINIC | Age: 63
End: 2023-12-14

## 2023-12-14 NOTE — PROGRESS NOTES
Called pt to scheduled a hospital follow-up appt :    LISA Request :    Dr. Nj Barksdale. Snehal Keller  Schedule an appointment as soon as possible for a visit in 1 week(s).     Specialty: ENDOCRINOLOGY  Contact information:  [address] [place] [address] [phone]     No answer, LVM to call back for assistance with scheduling

## 2023-12-18 NOTE — PROGRESS NOTES
Again, called pt to scheduled a hospital follow-up appt :    DM Request :    Dr. Iraj Johnson. Vanda Mejia  Schedule an appointment as soon as possible for a visit in 1 week(s). Specialty: ENDOCRINOLOGY  Contact information:  [facility]  [phone]     Spoke with spouse who declined scheduling at this time. SHe states, they are going to follow-up with the pt's PCP.   I did share the contact information for Dr. Vanda Mejia, to call if they decide to follow-up with this Endocrinologist.     Closing encounter

## 2023-12-24 ENCOUNTER — APPOINTMENT (OUTPATIENT)
Dept: CT IMAGING | Facility: HOSPITAL | Age: 63
End: 2023-12-24
Attending: EMERGENCY MEDICINE
Payer: COMMERCIAL

## 2023-12-24 ENCOUNTER — HOSPITAL ENCOUNTER (INPATIENT)
Facility: HOSPITAL | Age: 63
LOS: 4 days | Discharge: HOME HEALTH | End: 2023-12-28
Attending: EMERGENCY MEDICINE | Admitting: INTERNAL MEDICINE
Payer: COMMERCIAL

## 2023-12-24 ENCOUNTER — HOSPITAL ENCOUNTER (INPATIENT)
Facility: HOSPITAL | Age: 63
LOS: 4 days | Discharge: HOME | End: 2023-12-28
Attending: EMERGENCY MEDICINE | Admitting: INTERNAL MEDICINE
Payer: COMMERCIAL

## 2023-12-24 DIAGNOSIS — E87.1 HYPONATREMIA: ICD-10-CM

## 2023-12-24 DIAGNOSIS — K59.00 CONSTIPATION, UNSPECIFIED CONSTIPATION TYPE: ICD-10-CM

## 2023-12-24 DIAGNOSIS — K81.0 ACUTE CHOLECYSTITIS: Primary | ICD-10-CM

## 2023-12-24 DIAGNOSIS — E83.42 HYPOMAGNESEMIA: ICD-10-CM

## 2023-12-24 LAB
ALBUMIN SERPL-MCNC: 4.4 G/DL (ref 3.2–4.8)
ALBUMIN/GLOB SERPL: 1.2 {RATIO} (ref 1–2)
ALP LIVER SERPL-CCNC: 150 U/L
ALT SERPL-CCNC: 13 U/L
ANION GAP SERPL CALC-SCNC: 7 MMOL/L (ref 0–18)
AST SERPL-CCNC: 18 U/L (ref ?–34)
BASOPHILS # BLD AUTO: 0.1 X10(3) UL (ref 0–0.2)
BASOPHILS NFR BLD AUTO: 0.5 %
BILIRUB SERPL-MCNC: 1.9 MG/DL (ref 0.2–1.1)
BUN BLD-MCNC: 18 MG/DL (ref 9–23)
BUN/CREAT SERPL: 15.1 (ref 10–20)
CALCIUM BLD-MCNC: 9.9 MG/DL (ref 8.7–10.4)
CHLORIDE SERPL-SCNC: 96 MMOL/L (ref 98–112)
CO2 SERPL-SCNC: 27 MMOL/L (ref 21–32)
CREAT BLD-MCNC: 1.19 MG/DL
DEPRECATED RDW RBC AUTO: 44.1 FL (ref 35.1–46.3)
EGFRCR SERPLBLD CKD-EPI 2021: 69 ML/MIN/1.73M2 (ref 60–?)
EOSINOPHIL # BLD AUTO: 0.05 X10(3) UL (ref 0–0.7)
EOSINOPHIL NFR BLD AUTO: 0.3 %
ERYTHROCYTE [DISTWIDTH] IN BLOOD BY AUTOMATED COUNT: 15.5 % (ref 11–15)
GLOBULIN PLAS-MCNC: 3.8 G/DL (ref 2.8–4.4)
GLUCOSE BLD-MCNC: 208 MG/DL (ref 70–99)
HCT VFR BLD AUTO: 38.4 %
HGB BLD-MCNC: 13.2 G/DL
IMM GRANULOCYTES # BLD AUTO: 0.08 X10(3) UL (ref 0–1)
IMM GRANULOCYTES NFR BLD: 0.4 %
LACTATE SERPL-SCNC: 1.5 MMOL/L (ref 0.5–2)
LIPASE SERPL-CCNC: 50 U/L (ref 13–75)
LYMPHOCYTES # BLD AUTO: 2.97 X10(3) UL (ref 1–4)
LYMPHOCYTES NFR BLD AUTO: 16.3 %
MAGNESIUM SERPL-MCNC: 1.5 MG/DL (ref 1.6–2.6)
MCH RBC QN AUTO: 27.2 PG (ref 26–34)
MCHC RBC AUTO-ENTMCNC: 34.4 G/DL (ref 31–37)
MCV RBC AUTO: 79 FL
MONOCYTES # BLD AUTO: 1.76 X10(3) UL (ref 0.1–1)
MONOCYTES NFR BLD AUTO: 9.6 %
NEUTROPHILS # BLD AUTO: 13.29 X10 (3) UL (ref 1.5–7.7)
NEUTROPHILS # BLD AUTO: 13.29 X10(3) UL (ref 1.5–7.7)
NEUTROPHILS NFR BLD AUTO: 72.9 %
OSMOLALITY SERPL CALC.SUM OF ELEC: 278 MOSM/KG (ref 275–295)
PLATELET # BLD AUTO: 201 10(3)UL (ref 150–450)
POTASSIUM SERPL-SCNC: 4.4 MMOL/L (ref 3.5–5.1)
PROT SERPL-MCNC: 8.2 G/DL (ref 5.7–8.2)
RBC # BLD AUTO: 4.86 X10(6)UL
SODIUM SERPL-SCNC: 130 MMOL/L (ref 136–145)
WBC # BLD AUTO: 18.3 X10(3) UL (ref 4–11)

## 2023-12-24 PROCEDURE — 74177 CT ABD & PELVIS W/CONTRAST: CPT | Performed by: EMERGENCY MEDICINE

## 2023-12-24 PROCEDURE — 99254 IP/OBS CNSLTJ NEW/EST MOD 60: CPT | Performed by: SURGERY

## 2023-12-24 RX ORDER — MAGNESIUM SULFATE HEPTAHYDRATE 40 MG/ML
2 INJECTION, SOLUTION INTRAVENOUS ONCE
Status: COMPLETED | OUTPATIENT
Start: 2023-12-24 | End: 2023-12-24

## 2023-12-24 RX ORDER — ONDANSETRON 2 MG/ML
4 INJECTION INTRAMUSCULAR; INTRAVENOUS ONCE
Status: COMPLETED | OUTPATIENT
Start: 2023-12-24 | End: 2023-12-24

## 2023-12-24 RX ORDER — ONDANSETRON 2 MG/ML
4 INJECTION INTRAMUSCULAR; INTRAVENOUS EVERY 6 HOURS PRN
Status: DISCONTINUED | OUTPATIENT
Start: 2023-12-24 | End: 2023-12-28

## 2023-12-24 RX ORDER — GABAPENTIN 300 MG/1
300 CAPSULE ORAL 2 TIMES DAILY
Status: DISCONTINUED | OUTPATIENT
Start: 2023-12-24 | End: 2023-12-28

## 2023-12-24 RX ORDER — MIDODRINE HYDROCHLORIDE 5 MG/1
10 TABLET ORAL
Status: DISCONTINUED | OUTPATIENT
Start: 2023-12-25 | End: 2023-12-28

## 2023-12-24 RX ORDER — PANTOPRAZOLE SODIUM 40 MG/1
40 TABLET, DELAYED RELEASE ORAL
Status: DISCONTINUED | OUTPATIENT
Start: 2023-12-25 | End: 2023-12-28

## 2023-12-24 RX ORDER — ONDANSETRON 2 MG/ML
INJECTION INTRAMUSCULAR; INTRAVENOUS
Status: COMPLETED
Start: 2023-12-24 | End: 2023-12-24

## 2023-12-24 RX ORDER — LACTULOSE 10 G/15ML
10 SOLUTION ORAL DAILY
Status: DISCONTINUED | OUTPATIENT
Start: 2023-12-25 | End: 2023-12-28

## 2023-12-24 RX ORDER — MORPHINE SULFATE 2 MG/ML
1 INJECTION, SOLUTION INTRAMUSCULAR; INTRAVENOUS EVERY 2 HOUR PRN
Status: DISCONTINUED | OUTPATIENT
Start: 2023-12-24 | End: 2023-12-28

## 2023-12-24 RX ORDER — MORPHINE SULFATE 4 MG/ML
4 INJECTION, SOLUTION INTRAMUSCULAR; INTRAVENOUS EVERY 2 HOUR PRN
Status: DISCONTINUED | OUTPATIENT
Start: 2023-12-24 | End: 2023-12-28

## 2023-12-24 RX ORDER — SODIUM CHLORIDE 9 MG/ML
INJECTION, SOLUTION INTRAVENOUS CONTINUOUS
Status: DISCONTINUED | OUTPATIENT
Start: 2023-12-24 | End: 2023-12-25

## 2023-12-24 RX ORDER — MORPHINE SULFATE 2 MG/ML
2 INJECTION, SOLUTION INTRAMUSCULAR; INTRAVENOUS EVERY 2 HOUR PRN
Status: DISCONTINUED | OUTPATIENT
Start: 2023-12-24 | End: 2023-12-28

## 2023-12-24 RX ORDER — PROCHLORPERAZINE EDISYLATE 5 MG/ML
5 INJECTION INTRAMUSCULAR; INTRAVENOUS EVERY 8 HOURS PRN
Status: DISCONTINUED | OUTPATIENT
Start: 2023-12-24 | End: 2023-12-28

## 2023-12-24 NOTE — ED INITIAL ASSESSMENT (HPI)
Pt arrives via EMS from home, c/o RUQ abd pain since last night, also vomited x1 last night. Pt denies urinary symptoms, nausea or diarrhea at this time.

## 2023-12-25 ENCOUNTER — APPOINTMENT (OUTPATIENT)
Dept: CT IMAGING | Facility: HOSPITAL | Age: 63
End: 2023-12-25
Attending: INTERNAL MEDICINE
Payer: COMMERCIAL

## 2023-12-25 LAB
ALBUMIN SERPL-MCNC: 3.7 G/DL (ref 3.2–4.8)
ALBUMIN/GLOB SERPL: 1 {RATIO} (ref 1–2)
ALP LIVER SERPL-CCNC: 123 U/L
ALT SERPL-CCNC: 9 U/L
ANION GAP SERPL CALC-SCNC: 4 MMOL/L (ref 0–18)
AST SERPL-CCNC: 13 U/L (ref ?–34)
BASOPHILS # BLD AUTO: 0.08 X10(3) UL (ref 0–0.2)
BASOPHILS NFR BLD AUTO: 0.5 %
BILIRUB SERPL-MCNC: 2 MG/DL (ref 0.2–1.1)
BILIRUB UR QL: NEGATIVE
BILIRUB UR QL: NEGATIVE
BUN BLD-MCNC: 14 MG/DL (ref 9–23)
BUN/CREAT SERPL: 11.9 (ref 10–20)
CALCIUM BLD-MCNC: 9.2 MG/DL (ref 8.7–10.4)
CHLORIDE SERPL-SCNC: 102 MMOL/L (ref 98–112)
CLARITY UR: CLEAR
CLARITY UR: CLEAR
CO2 SERPL-SCNC: 26 MMOL/L (ref 21–32)
COLOR UR: YELLOW
COLOR UR: YELLOW
CREAT BLD-MCNC: 1.18 MG/DL
DEPRECATED RDW RBC AUTO: 46.3 FL (ref 35.1–46.3)
EGFRCR SERPLBLD CKD-EPI 2021: 70 ML/MIN/1.73M2 (ref 60–?)
EOSINOPHIL # BLD AUTO: 0.08 X10(3) UL (ref 0–0.7)
EOSINOPHIL NFR BLD AUTO: 0.5 %
ERYTHROCYTE [DISTWIDTH] IN BLOOD BY AUTOMATED COUNT: 15.8 % (ref 11–15)
GGT SERPL-CCNC: 45 U/L
GLOBULIN PLAS-MCNC: 3.6 G/DL (ref 2.8–4.4)
GLUCOSE BLD-MCNC: 141 MG/DL (ref 70–99)
GLUCOSE BLDC GLUCOMTR-MCNC: 137 MG/DL (ref 70–99)
GLUCOSE BLDC GLUCOMTR-MCNC: 156 MG/DL (ref 70–99)
GLUCOSE BLDC GLUCOMTR-MCNC: 92 MG/DL (ref 70–99)
GLUCOSE UR-MCNC: NORMAL MG/DL
GLUCOSE UR-MCNC: NORMAL MG/DL
HCT VFR BLD AUTO: 36.3 %
HGB BLD-MCNC: 12.1 G/DL
HGB UR QL STRIP.AUTO: NEGATIVE
HYALINE CASTS #/AREA URNS AUTO: PRESENT /LPF
IMM GRANULOCYTES # BLD AUTO: 0.1 X10(3) UL (ref 0–1)
IMM GRANULOCYTES NFR BLD: 0.6 %
INR BLD: 1.22 (ref 0.8–1.2)
KETONES UR-MCNC: NEGATIVE MG/DL
KETONES UR-MCNC: NEGATIVE MG/DL
LEUKOCYTE ESTERASE UR QL STRIP.AUTO: 250
LEUKOCYTE ESTERASE UR QL STRIP.AUTO: 250
LYMPHOCYTES # BLD AUTO: 1.73 X10(3) UL (ref 1–4)
LYMPHOCYTES NFR BLD AUTO: 10.4 %
MAGNESIUM SERPL-MCNC: 1.8 MG/DL (ref 1.6–2.6)
MCH RBC QN AUTO: 27.1 PG (ref 26–34)
MCHC RBC AUTO-ENTMCNC: 33.3 G/DL (ref 31–37)
MCV RBC AUTO: 81.2 FL
MONOCYTES # BLD AUTO: 1.71 X10(3) UL (ref 0.1–1)
MONOCYTES NFR BLD AUTO: 10.2 %
NEUTROPHILS # BLD AUTO: 12.99 X10 (3) UL (ref 1.5–7.7)
NEUTROPHILS # BLD AUTO: 12.99 X10(3) UL (ref 1.5–7.7)
NEUTROPHILS NFR BLD AUTO: 77.8 %
NITRITE UR QL STRIP.AUTO: NEGATIVE
NITRITE UR QL STRIP.AUTO: NEGATIVE
OSMOLALITY SERPL CALC.SUM OF ELEC: 277 MOSM/KG (ref 275–295)
PH UR: 5 [PH] (ref 5–8)
PH UR: 5 [PH] (ref 5–8)
PHOSPHATE SERPL-MCNC: 3.6 MG/DL (ref 2.4–5.1)
PLATELET # BLD AUTO: 179 10(3)UL (ref 150–450)
POTASSIUM SERPL-SCNC: 4.4 MMOL/L (ref 3.5–5.1)
PROT SERPL-MCNC: 7.3 G/DL (ref 5.7–8.2)
PROT UR-MCNC: NEGATIVE MG/DL
PROT UR-MCNC: NEGATIVE MG/DL
PROTHROMBIN TIME: 16.2 SECONDS (ref 11.6–14.8)
RBC # BLD AUTO: 4.47 X10(6)UL
SODIUM SERPL-SCNC: 132 MMOL/L (ref 136–145)
SP GR UR STRIP: 1.02 (ref 1–1.03)
SP GR UR STRIP: 1.02 (ref 1–1.03)
UROBILINOGEN UR STRIP-ACNC: NORMAL
UROBILINOGEN UR STRIP-ACNC: NORMAL
WBC # BLD AUTO: 16.7 X10(3) UL (ref 4–11)

## 2023-12-25 PROCEDURE — 99233 SBSQ HOSP IP/OBS HIGH 50: CPT | Performed by: SURGERY

## 2023-12-25 PROCEDURE — 70486 CT MAXILLOFACIAL W/O DYE: CPT | Performed by: INTERNAL MEDICINE

## 2023-12-25 RX ORDER — SPIRONOLACTONE 25 MG/1
25 TABLET ORAL DAILY
Status: DISCONTINUED | OUTPATIENT
Start: 2023-12-25 | End: 2023-12-28

## 2023-12-25 RX ORDER — AMOXICILLIN AND CLAVULANATE POTASSIUM 875; 125 MG/1; MG/1
875 TABLET, FILM COATED ORAL EVERY 12 HOURS SCHEDULED
Status: DISCONTINUED | OUTPATIENT
Start: 2023-12-25 | End: 2023-12-25

## 2023-12-25 RX ORDER — NICOTINE POLACRILEX 4 MG
30 LOZENGE BUCCAL
Status: DISCONTINUED | OUTPATIENT
Start: 2023-12-25 | End: 2023-12-28

## 2023-12-25 RX ORDER — FOLIC ACID 1 MG/1
1 TABLET ORAL DAILY
Status: DISCONTINUED | OUTPATIENT
Start: 2023-12-25 | End: 2023-12-28

## 2023-12-25 RX ORDER — FUROSEMIDE 20 MG/1
20 TABLET ORAL DAILY
Status: DISCONTINUED | OUTPATIENT
Start: 2023-12-25 | End: 2023-12-28

## 2023-12-25 RX ORDER — NICOTINE POLACRILEX 4 MG
15 LOZENGE BUCCAL
Status: DISCONTINUED | OUTPATIENT
Start: 2023-12-25 | End: 2023-12-28

## 2023-12-25 RX ORDER — DEXTROSE MONOHYDRATE 25 G/50ML
50 INJECTION, SOLUTION INTRAVENOUS
Status: DISCONTINUED | OUTPATIENT
Start: 2023-12-25 | End: 2023-12-28

## 2023-12-25 RX ORDER — HEPARIN SODIUM 5000 [USP'U]/ML
5000 INJECTION, SOLUTION INTRAVENOUS; SUBCUTANEOUS EVERY 8 HOURS SCHEDULED
Status: DISCONTINUED | OUTPATIENT
Start: 2023-12-25 | End: 2023-12-28

## 2023-12-25 RX ORDER — ESCITALOPRAM OXALATE 5 MG/1
5 TABLET ORAL DAILY
Status: DISCONTINUED | OUTPATIENT
Start: 2023-12-26 | End: 2023-12-28

## 2023-12-25 RX ORDER — MELATONIN
100 DAILY
Status: DISCONTINUED | OUTPATIENT
Start: 2023-12-25 | End: 2023-12-28

## 2023-12-25 RX ORDER — SUCRALFATE ORAL 1 G/10ML
1 SUSPENSION ORAL
Status: DISCONTINUED | OUTPATIENT
Start: 2023-12-25 | End: 2023-12-27

## 2023-12-25 RX ORDER — MAGNESIUM SULFATE HEPTAHYDRATE 40 MG/ML
2 INJECTION, SOLUTION INTRAVENOUS ONCE
Status: COMPLETED | OUTPATIENT
Start: 2023-12-25 | End: 2023-12-25

## 2023-12-25 RX ORDER — MAGNESIUM OXIDE 400 MG/1
400 TABLET ORAL 2 TIMES DAILY
Status: DISCONTINUED | OUTPATIENT
Start: 2023-12-25 | End: 2023-12-28

## 2023-12-25 NOTE — ED QUICK NOTES
Orders for admission, patient is aware of plan and ready to go upstairs. Any questions, please call ED RN Vanesa at [address].      Patient Covid vaccination status: Unvaccinated     COVID Test Ordered in ED: None    COVID Suspicion at Admission: N/A    Running Infusions:  None    Mental Status/LOC at time of transport: A&Ox4/4    Other pertinent information:   CIWA score: N/A   NIH score:  N/A

## 2023-12-25 NOTE — PROGRESS NOTES
General surgery consult    Consult will be dictated, chart reviewed. Cholethiasis in a patient with advanced cirrhosis. G.I. to see. Interventional radiology to place cholecystostomy tube. NPO, IV. antibiotics.    High operative risk

## 2023-12-25 NOTE — CONSULTS
[facility]    PATIENT'S NAME: Ray Cox   ATTENDING PHYSICIAN: Jose Mendoza DO   CONSULTING PHYSICIAN: Norbert Ross MD   PATIENT ACCOUNT#:   [de-identified]    LOCATION:  [address] [place] [address] [address] #:   [id number]       YOB: 1960  ADMISSION DATE:       12/24/2023      CONSULT DATE:  12/24/2023    REPORT OF CONSULTATION      REASON FOR CONSULTATION:  Cholecystitis. HISTORY OF PRESENT ILLNESS:  The patient is a 66, history of alcoholic cirrhosis, decompensated hypertension, and hyperlipidemia, presents with upper abdominal pain, nausea, vomiting. A CT scan demonstrates advanced cirrhosis, cholelithiasis with possible cholecystitis although nonspecific, large fecal impaction, hiatal hernia. PAST MEDICAL HISTORY:  Above, reflux, neuropathy. ALLERGIES:  No allergies. SOCIAL HISTORY:  . Does not smoke. Quit drinking 1 year ago. Denies illicit drugs. REVIEW OF SYSTEMS:  Significant for abdominal pain, nausea. PHYSICAL EXAMINATION:    GENERAL:  He is alert and oriented, appears uncomfortable but in no distress. HEENT:  Nonicteric sclerae. EOMI, PERRLA. NECK:  Supple without lymphadenopathy. LUNGS:  Clear to auscultation. No rhonchi or wheezing. HEART:  Regular rate and rhythm. No murmur, rubs, or gallops. ABDOMEN:  Soft. Slightly tender in the epigastrium. No rebound or guarding. Negative Scott's. EXTREMITIES:  Without clubbing, cyanosis, or edema. LABORATORY DATA:  White count of 18.3. IMPRESSION:  Possible cholecystitis. The patient is not an operative candidate due to his high mortality from his cirrhosis. If concern for acute cholecystitis, consult Interventional Radiology for cholecystostomy tube. Would cover with antibiotics. Thank you for this consultation.     Dictated By Norbert Ross MD  d: 12/25/2023 09:04:41  t: 12/25/2023 09:29:57  Job [id number]  HE/    cc: Jose Mendoza DO

## 2023-12-25 NOTE — PLAN OF CARE
Patient alert and oriented x4. Forgetful at times. RA. Remote tele in place. NPO w/sips of meds. PRN IV morphine for pain given. On IV zosyn for antibiotic coverage. IV fluids infusing. Blood cultures pending. Voiding in small amounts throughout the night. Straight cath provided for urine retention. General surgery on consult. Call light within reach . Safety measures in place. Problem: PAIN - ADULT  Goal: Verbalizes/displays adequate comfort level or patient's stated pain goal  Description: INTERVENTIONS:  - Encourage pt to monitor pain and request assistance  - Assess pain using appropriate pain scale  - Administer analgesics based on type and severity of pain and evaluate response  - Implement non-pharmacological measures as appropriate and evaluate response  - Consider cultural and social influences on pain and pain management  - Manage/alleviate anxiety  - Utilize distraction and/or relaxation techniques  - Monitor for opioid side effects  - Notify MD/LIP if interventions unsuccessful or patient reports new pain  - Anticipate increased pain with activity and pre-medicate as appropriate  Outcome: Progressing     Problem: RISK FOR INFECTION - ADULT  Goal: Absence of fever/infection during anticipated neutropenic period  Description: INTERVENTIONS  - Monitor WBC  - Administer growth factors as ordered  - Implement neutropenic guidelines  Outcome: Progressing     Problem: SAFETY ADULT - FALL  Goal: Free from fall injury  Description: INTERVENTIONS:  - Assess pt frequently for physical needs  - Identify cognitive and physical deficits and behaviors that affect risk of falls.   - [place] fall precautions as indicated by assessment.  - Educate pt/family on patient safety including physical limitations  - Instruct pt to call for assistance with activity based on assessment  - Modify environment to reduce risk of injury  - Provide assistive devices as appropriate  - Consider OT/PT consult to assist with strengthening/mobility  - Encourage toileting schedule  Outcome: Progressing     Problem: DISCHARGE PLANNING  Goal: Discharge to home or other facility with appropriate resources  Description: INTERVENTIONS:  - Identify barriers to discharge w/pt and caregiver  - Include patient/family/discharge partner in discharge planning  - Arrange for needed discharge resources and transportation as appropriate  - Identify discharge learning needs (meds, wound care, etc)  - Arrange for interpreters to assist at discharge as needed  - Consider post-discharge preferences of patient/family/discharge partner  - Complete POLST form as appropriate  - Assess patient's ability to be responsible for managing their own health  - Refer to Case Management Department for coordinating discharge planning if the patient needs post-hospital services based on physician/LIP order or complex needs related to functional status, cognitive ability or social support system  Outcome: Progressing

## 2023-12-26 ENCOUNTER — APPOINTMENT (OUTPATIENT)
Dept: NUCLEAR MEDICINE | Facility: HOSPITAL | Age: 63
End: 2023-12-26
Attending: INTERNAL MEDICINE
Payer: COMMERCIAL

## 2023-12-26 ENCOUNTER — APPOINTMENT (OUTPATIENT)
Dept: CV DIAGNOSTICS | Facility: HOSPITAL | Age: 63
End: 2023-12-26
Attending: INTERNAL MEDICINE
Payer: COMMERCIAL

## 2023-12-26 LAB
ALBUMIN SERPL-MCNC: 3.8 G/DL (ref 3.2–4.8)
ALBUMIN/GLOB SERPL: 1.1 {RATIO} (ref 1–2)
ALP LIVER SERPL-CCNC: 110 U/L
ALT SERPL-CCNC: 10 U/L
ANION GAP SERPL CALC-SCNC: 8 MMOL/L (ref 0–18)
AST SERPL-CCNC: 21 U/L (ref ?–34)
BILIRUB SERPL-MCNC: 1.5 MG/DL (ref 0.2–1.1)
BUN BLD-MCNC: 15 MG/DL (ref 9–23)
BUN/CREAT SERPL: 13.8 (ref 10–20)
CALCIUM BLD-MCNC: 9 MG/DL (ref 8.7–10.4)
CHLORIDE SERPL-SCNC: 97 MMOL/L (ref 98–112)
CO2 SERPL-SCNC: 26 MMOL/L (ref 21–32)
CREAT BLD-MCNC: 1.09 MG/DL
DEPRECATED RDW RBC AUTO: 46.7 FL (ref 35.1–46.3)
EGFRCR SERPLBLD CKD-EPI 2021: 77 ML/MIN/1.73M2 (ref 60–?)
ERYTHROCYTE [DISTWIDTH] IN BLOOD BY AUTOMATED COUNT: 15.7 % (ref 11–15)
GLOBULIN PLAS-MCNC: 3.4 G/DL (ref 2.8–4.4)
GLUCOSE BLD-MCNC: 104 MG/DL (ref 70–99)
GLUCOSE BLDC GLUCOMTR-MCNC: 117 MG/DL (ref 70–99)
GLUCOSE BLDC GLUCOMTR-MCNC: 128 MG/DL (ref 70–99)
GLUCOSE BLDC GLUCOMTR-MCNC: 157 MG/DL (ref 70–99)
GLUCOSE BLDC GLUCOMTR-MCNC: 178 MG/DL (ref 70–99)
HCT VFR BLD AUTO: 33.4 %
HGB BLD-MCNC: 11.2 G/DL
MAGNESIUM SERPL-MCNC: 1.8 MG/DL (ref 1.6–2.6)
MCH RBC QN AUTO: 27.2 PG (ref 26–34)
MCHC RBC AUTO-ENTMCNC: 33.5 G/DL (ref 31–37)
MCV RBC AUTO: 81.1 FL
OSMOLALITY SERPL CALC.SUM OF ELEC: 273 MOSM/KG (ref 275–295)
PLATELET # BLD AUTO: 183 10(3)UL (ref 150–450)
POTASSIUM SERPL-SCNC: 4 MMOL/L (ref 3.5–5.1)
PROT SERPL-MCNC: 7.2 G/DL (ref 5.7–8.2)
RBC # BLD AUTO: 4.12 X10(6)UL
SODIUM SERPL-SCNC: 131 MMOL/L (ref 136–145)
WBC # BLD AUTO: 12 X10(3) UL (ref 4–11)

## 2023-12-26 PROCEDURE — 93306 TTE W/DOPPLER COMPLETE: CPT | Performed by: INTERNAL MEDICINE

## 2023-12-26 PROCEDURE — 78226 HEPATOBILIARY SYSTEM IMAGING: CPT | Performed by: INTERNAL MEDICINE

## 2023-12-26 RX ORDER — METRONIDAZOLE 500 MG/1
500 TABLET ORAL EVERY 8 HOURS SCHEDULED
Status: DISCONTINUED | OUTPATIENT
Start: 2023-12-26 | End: 2023-12-28

## 2023-12-26 RX ORDER — MAGNESIUM OXIDE 400 MG/1
400 TABLET ORAL ONCE
Status: DISCONTINUED | OUTPATIENT
Start: 2023-12-26 | End: 2023-12-26

## 2023-12-26 NOTE — CM/SW NOTE
12/26/23 1400   CM/SW Referral Data   Referral Source    Reason for Referral Discharge planning   Informant Patient   Readmission Assessment   Factors that patient feels contributed to this readmission Acute/Chronic Clinical Presentation   Pt's living situation prior to admission? Home with family   Pt's level of independence at discharge? Some assist (mod)   Medical Hx   Does patient have an established PCP? Yes   Patient Info   Patient's Current Mental Status at Time of Assessment Alert;Oriented   Patient's [address]   Patient lives with Spouse/Significant other   Patient Status Prior to Admission   Independent with ADLs and Mobility No   Pt. requires assistance with Housework;Driving;Meals; Ambulating; Bathing   Discharge Needs   Anticipated D/C needs Home health care   Choice of Post-Acute Provider   Informed patient of right to choose their preferred provider Yes   List of appropriate post-acute services provided to patient/family with quality data Yes   Patient/family choice [facility]     PT recommending HH on dc. Met with Gabby Bullard at bedside. Pt has hx with [facility]. He would like to use them again on dc. HH ref sent via [employer]. [facility] res. F2f entered. Plan: Home w/ HH when stable. / to remain available for support and/or discharge planning. Bautista Valero.  Viktor Tate RN, BSN  Nurse   [phone]

## 2023-12-26 NOTE — PLAN OF CARE
A/O x 3-4. Forgetful/confused at times. Tolerating clear liquid diet, advanced to full liquids. Up to chair this afternoon with PT, assisted back to bed 2 person stand pivot, unsteady gait. Voiding via primofit. BM on bedpan. Denies pain. Rocephin and PO flagyl per orders. Wife at bedside and updated on plan of care. Bed in lowest position, call light in reach, frequent rounding, nonskid footwear, fall precautions in place.

## 2023-12-26 NOTE — PHYSICAL THERAPY NOTE
PHYSICAL THERAPY EVALUATION - INPATIENT     Room Number: 453/453-A  Evaluation Date: 12/26/2023  Type of Evaluation: Initial   Physician Order: PT Eval and Treat    Presenting Problem: s/p ivana tube  Reason for Therapy: Mobility Dysfunction and Discharge Planning    PHYSICAL THERAPY ASSESSMENT   Orders received and chart reviewed. ANTONIO Bangura approved participation in physical therapy. Session coordinated with OT. PPE worn by therapist: mask and gloves. Patient was not wearing a mask during session. Patient is a 66year old male admitted 12/24/2023 for Presenting Problem: s/p ivana tube. Patient presented in bed with pain. Education provided on Physical therapy plan of care and physiological benefits of out of bed mobility. Patient with good carryover. Patient on room air. Patient currently with CGA to min A x 2 for mobility during the session with rolling walker. Patient able to transfer to edge of bed with SBA, able to sit to/from stand with min A x 1 and able to ambulate about 3ft to bedside chair with rolling walker with min A x 2 for posterior loss of balance. Patient is currently functioning below baseline with bed mobility, transfers, gait, and stair negotiation as a result of the following impairments: decreased functional strength and medical status. Patient history and/or personal factors that may impact the plan of care include home accessibility concerns. Based on the physical therapy examination of the noted systems and functional activity/participation limitations, the patient presentation is evolving given the patient demonstrates worsening of previously stable condition and demonstrates worsening of co-morbidities. Patient would benefit from continued skilled physical therapy interventions to maximize patient safety and functional independence. Updated nurse on results of session, patient left in bedside chair, all lines intact, all needs met with call light in reach.  Next session anticipate to Pt provided with discharge paperwork and educated on recommended follow-up with PCP. Pt educated on how to manage symptoms at home. Pt voiced understanding and denied any questions at discharge. Pt brought to lobby in wheelchair.   progress bed mobility, transfers, and gait. Bed mobility: Supervision  Transfers: Min assist  Gait Assistance: Minimum assistance (steps to bedside chair)  Distance (ft): steps to bedside chair  Assistive Device: Rolling walker             Patient was left in bedside chair at end of session with all needs in reach. Patient's current functional deficits include bed mobility, transfers, and gait. Patient does have the physical skills to return to prior living environment upon D/C from the hospital. Anticipate patient will benefit from continued skilled physical therapy while in the hospital and upon D/C from the hospital with home health and 24 hour assist. The patient's Approx Degree of Impairment: 57.7% has been calculated based on documentation in the [facility] '6 clicks' Inpatient Basic Mobility Short Form. Research supports that patients with this level of impairment may benefit from Subacute Rehab. However, patient would benefit from home health and 24 hour supervision/assist initially. RN aware of patient status post session. Patient will benefit from continued IP PT services to address these deficits in preparation for discharge. DISCHARGE RECOMMENDATIONS  PT Discharge Recommendations: 24 hour care/supervision;Home with home health PT    PLAN  PT Treatment Plan: Bed mobility; Body mechanics; Patient education;Gait training;Transfer training;Balance training  Rehab Potential : Good  Frequency (Obs): 5x/week       PHYSICAL THERAPY MEDICAL/SOCIAL HISTORY   Problem List  Principal Problem:    Acute cholecystitis  Active Problems:    Hyponatremia    Hypomagnesemia    Constipation, unspecified constipation type    Past Medical History  Past Medical History:   Diagnosis Date    Alcoholic cirrhosis ([facility] [address]     Esophageal reflux     High blood pressure     High cholesterol     Neuropathy      Past Surgical History  History reviewed. No pertinent surgical history.     HOME SITUATION  Type of Home: House   Home Layout: One level  Stairs to Enter : 0  Railing: No  Stairs to Bedroom: 0  Railing: No  Lives With: Spouse  Drives: No  Patient Owned Equipment: Rolling walker; Wheelchair  Patient Regularly Uses: None    Prior Level of [place]: Patient reports assist with ADL's and ambulation with rolling walker household distances prior to admission to the hospital.     SUBJECTIVE  \"I was moving all over this morning\"    PHYSICAL THERAPY EXAMINATION     OBJECTIVE  Precautions: None  Fall Risk: Standard fall risk    WEIGHT BEARING RESTRICTION  Weight Bearing Restriction: None                PAIN ASSESSMENT  Ratin          COGNITION  Overall Cognitive Status:  WFL - within functional limits    RANGE OF MOTION AND STRENGTH ASSESSMENT    Lower extremity ROM is within functional limits  BLE WNL    Lower extremity strength is within functional limits  BLE WNL    BALANCE  Static Sitting: Good  Dynamic Sitting: Fair +  Static Standing: Fair  Dynamic Standing: Poor +    AM-PAC '6-Clicks' INPATIENT SHORT FORM - BASIC MOBILITY  How much difficulty does the patient currently have. .. Patient Difficulty: Turning over in bed (including adjusting bedclothes, sheets and blankets)?: A Little   Patient Difficulty: Sitting down on and standing up from a chair with arms (e.g., wheelchair, bedside commode, etc.): A Little   Patient Difficulty: Moving from lying on back to sitting on the side of the bed?: A Little   How much help from another person does the patient currently need. ..    Help from Another: Moving to and from a bed to a chair (including a wheelchair)?: A Lot   Help from Another: Need to walk in hospital room?: A Lot   Help from Another: Climbing 3-5 steps with a railing?: A Lot     AM-PAC Score:  Raw Score: 15   Approx Degree of Impairment: 57.7%   Standardized Score (AM-PAC Scale): 39.45   CMS Modifier (G-Code): CK    Exercise/Education Provided:  Bed mobility  Body mechanics  Transfer training    Patient End of Session: Up in chair;Needs met;Call light within reach;RN aware of session/findings; All patient questions and concerns addressed    CURRENT GOALS  Goals to be met by: 1/7/24  Patient Goal Patient's self-stated goal is: to go home   Goal #1 Patient is able to demonstrate supine - sit EOB @ level: independent     Goal #1   Current Status    Goal #2 Patient is able to demonstrate transfers Sit to/from Stand at assistance level: modified independent with walker - rolling     Goal #2  Current Status    Goal #3 Patient is able to ambulate 10 feet with assist device: walker - rolling at assistance level: supervision   Goal #3   Current Status    Goal #4    Goal #4   Current Status    Goal #5 Patient to demonstrate independence with home activity/exercise instructions provided to patient in preparation for discharge.    Goal #5   Current Status    Goal #6    Goal #6  Current Status     Patient Evaluation Complexity Level:  History Low - no personal factors and/or co-morbidities   Examination of body systems Low - addressing 1-2 elements   Clinical Presentation Moderate - Evolving   Clinical Decision Making Low Complexity     Therapeutic Activity: 10 minutes  Therapeutic Exercise: 13 minutes

## 2023-12-26 NOTE — PLAN OF CARE
Tolerating clear liquids, A&O x3/forgetful at times, drowsy during the day, no complaints of RUQ pain,surgery is monitoring,  blood culture +, ua/culture, repeat blood cultures and ID on consult, zosyn, 2d echo, ct maxilofacil ordered, magnesium replaced per protocol, voiding freely/checked pvr, bmx2, plan for PT/OT to see patient tomorrow. Patient and family updated on care plan.     Problem: Patient Centered Care  Goal: Patient preferences are identified and integrated in the patient's plan of care  Description: Interventions:  - What would you like us to know as we care for you? -  - Provide timely, complete, and accurate information to patient/family  - Incorporate patient and family knowledge, values, beliefs, and cultural backgrounds into the planning and delivery of care  - Encourage patient/family to participate in care and decision-making at the level they choose  - Honor patient and family perspectives and choices  Outcome: Progressing     Problem: Patient/Family Goals  Goal: Patient/Family Long Term Goal  Description: Patient's Long Term Goal: -    Interventions:  - -  - See additional Care Plan goals for specific interventions  Outcome: Progressing  Goal: Patient/Family Short Term Goal  Description: Patient's Short Term Goal: -    Interventions:   - -  - See additional Care Plan goals for specific interventions  Outcome: Progressing     Problem: PAIN - ADULT  Goal: Verbalizes/displays adequate comfort level or patient's stated pain goal  Description: INTERVENTIONS:  - Encourage pt to monitor pain and request assistance  - Assess pain using appropriate pain scale  - Administer analgesics based on type and severity of pain and evaluate response  - Implement non-pharmacological measures as appropriate and evaluate response  - Consider cultural and social influences on pain and pain management  - Manage/alleviate anxiety  - Utilize distraction and/or relaxation techniques  - Monitor for opioid side effects  - Notify MD/LIP if interventions unsuccessful or patient reports new pain  - Anticipate increased pain with activity and pre-medicate as appropriate  Outcome: Progressing     Problem: RISK FOR INFECTION - ADULT  Goal: Absence of fever/infection during anticipated neutropenic period  Description: INTERVENTIONS  - Monitor WBC  - Administer growth factors as ordered  - Implement neutropenic guidelines  Outcome: Progressing     Problem: SAFETY ADULT - FALL  Goal: Free from fall injury  Description: INTERVENTIONS:  - Assess pt frequently for physical needs  - Identify cognitive and physical deficits and behaviors that affect risk of falls.   - [place] fall precautions as indicated by assessment.  - Educate pt/family on patient safety including physical limitations  - Instruct pt to call for assistance with activity based on assessment  - Modify environment to reduce risk of injury  - Provide assistive devices as appropriate  - Consider OT/PT consult to assist with strengthening/mobility  - Encourage toileting schedule  Outcome: Progressing     Problem: DISCHARGE PLANNING  Goal: Discharge to home or other facility with appropriate resources  Description: INTERVENTIONS:  - Identify barriers to discharge w/pt and caregiver  - Include patient/family/discharge partner in discharge planning  - Arrange for needed discharge resources and transportation as appropriate  - Identify discharge learning needs (meds, wound care, etc)  - Arrange for interpreters to assist at discharge as needed  - Consider post-discharge preferences of patient/family/discharge partner  - Complete POLST form as appropriate  - Assess patient's ability to be responsible for managing their own health  - Refer to Case Management Department for coordinating discharge planning if the patient needs post-hospital services based on physician/LIP order or complex needs related to functional status, cognitive ability or social support system  Outcome: Progressing

## 2023-12-26 NOTE — OCCUPATIONAL THERAPY NOTE
OT orders rcvd, pt is off the floor in nuclear medicine; will re-attempt later schedule permitting.      Bridgett Duffy, Occupational Therapist, OTR/L ext [phone]

## 2023-12-27 ENCOUNTER — APPOINTMENT (OUTPATIENT)
Dept: PICC SERVICES | Facility: HOSPITAL | Age: 63
End: 2023-12-27
Attending: NURSE PRACTITIONER
Payer: COMMERCIAL

## 2023-12-27 LAB
ALBUMIN SERPL-MCNC: 3.9 G/DL (ref 3.2–4.8)
ALBUMIN/GLOB SERPL: 1.1 {RATIO} (ref 1–2)
ALP LIVER SERPL-CCNC: 118 U/L
ALT SERPL-CCNC: 11 U/L
ANION GAP SERPL CALC-SCNC: 7 MMOL/L (ref 0–18)
AST SERPL-CCNC: 19 U/L (ref ?–34)
BASOPHILS # BLD AUTO: 0.08 X10(3) UL (ref 0–0.2)
BASOPHILS NFR BLD AUTO: 0.7 %
BILIRUB SERPL-MCNC: 1 MG/DL (ref 0.2–1.1)
BUN BLD-MCNC: 12 MG/DL (ref 9–23)
BUN/CREAT SERPL: 10.9 (ref 10–20)
CALCIUM BLD-MCNC: 9.2 MG/DL (ref 8.7–10.4)
CHLORIDE SERPL-SCNC: 99 MMOL/L (ref 98–112)
CO2 SERPL-SCNC: 27 MMOL/L (ref 21–32)
CREAT BLD-MCNC: 1.1 MG/DL
DEPRECATED RDW RBC AUTO: 45.2 FL (ref 35.1–46.3)
EGFRCR SERPLBLD CKD-EPI 2021: 76 ML/MIN/1.73M2 (ref 60–?)
EOSINOPHIL # BLD AUTO: 0.4 X10(3) UL (ref 0–0.7)
EOSINOPHIL NFR BLD AUTO: 3.7 %
ERYTHROCYTE [DISTWIDTH] IN BLOOD BY AUTOMATED COUNT: 15.3 % (ref 11–15)
GLOBULIN PLAS-MCNC: 3.7 G/DL (ref 2.8–4.4)
GLUCOSE BLD-MCNC: 116 MG/DL (ref 70–99)
GLUCOSE BLDC GLUCOMTR-MCNC: 122 MG/DL (ref 70–99)
GLUCOSE BLDC GLUCOMTR-MCNC: 131 MG/DL (ref 70–99)
GLUCOSE BLDC GLUCOMTR-MCNC: 145 MG/DL (ref 70–99)
GLUCOSE BLDC GLUCOMTR-MCNC: 205 MG/DL (ref 70–99)
HCT VFR BLD AUTO: 33.6 %
HGB BLD-MCNC: 11.2 G/DL
IMM GRANULOCYTES # BLD AUTO: 0.06 X10(3) UL (ref 0–1)
IMM GRANULOCYTES NFR BLD: 0.5 %
LYMPHOCYTES # BLD AUTO: 2.26 X10(3) UL (ref 1–4)
LYMPHOCYTES NFR BLD AUTO: 20.7 %
MAGNESIUM SERPL-MCNC: 1.7 MG/DL (ref 1.6–2.6)
MCH RBC QN AUTO: 27.1 PG (ref 26–34)
MCHC RBC AUTO-ENTMCNC: 33.3 G/DL (ref 31–37)
MCV RBC AUTO: 81.2 FL
MONOCYTES # BLD AUTO: 1.01 X10(3) UL (ref 0.1–1)
MONOCYTES NFR BLD AUTO: 9.2 %
NEUTROPHILS # BLD AUTO: 7.11 X10 (3) UL (ref 1.5–7.7)
NEUTROPHILS # BLD AUTO: 7.11 X10(3) UL (ref 1.5–7.7)
NEUTROPHILS NFR BLD AUTO: 65.2 %
OSMOLALITY SERPL CALC.SUM OF ELEC: 277 MOSM/KG (ref 275–295)
PLATELET # BLD AUTO: 202 10(3)UL (ref 150–450)
POTASSIUM SERPL-SCNC: 3.8 MMOL/L (ref 3.5–5.1)
PROT SERPL-MCNC: 7.6 G/DL (ref 5.7–8.2)
RBC # BLD AUTO: 4.14 X10(6)UL
SODIUM SERPL-SCNC: 133 MMOL/L (ref 136–145)
STREPTOCOCCUS DNA BLD POS NAA+NON-PROBE: DETECTED
WBC # BLD AUTO: 10.9 X10(3) UL (ref 4–11)

## 2023-12-27 PROCEDURE — 05HB33Z INSERTION OF INFUSION DEVICE INTO RIGHT BASILIC VEIN, PERCUTANEOUS APPROACH: ICD-10-PCS | Performed by: INTERNAL MEDICINE

## 2023-12-27 RX ORDER — SUCRALFATE 1 G/1
1 TABLET ORAL 3 TIMES DAILY
Status: DISCONTINUED | OUTPATIENT
Start: 2023-12-27 | End: 2023-12-28

## 2023-12-27 RX ORDER — CEFTRIAXONE SODIUM 2 G/50ML
2 INJECTION, SOLUTION INTRAVENOUS EVERY 24 HOURS
Qty: 600 ML | Refills: 0 | Status: SHIPPED | OUTPATIENT
Start: 2023-12-27 | End: 2024-01-08

## 2023-12-27 RX ORDER — MAGNESIUM OXIDE 400 MG/1
400 TABLET ORAL ONCE
Status: COMPLETED | OUTPATIENT
Start: 2023-12-27 | End: 2023-12-27

## 2023-12-27 RX ORDER — METRONIDAZOLE 500 MG/1
500 TABLET ORAL EVERY 8 HOURS SCHEDULED
Qty: 36 TABLET | Refills: 0 | Status: SHIPPED | OUTPATIENT
Start: 2023-12-27 | End: 2024-01-08

## 2023-12-27 NOTE — PHYSICAL THERAPY NOTE
Attempted to see patient for therapy treatment this AM, pt declining all OOB mobility options offered by therapist this AM. States \"I don't like you physical therapists. \" Agreeable to therapy follow up another time, will carmela-attempt as pt schedule and pt participation permit. Thank you.        Audelia Jones, PT

## 2023-12-27 NOTE — PLAN OF CARE
A/O x 3-4, forgetful/confused at times. Tolerating full liquid diet, advanced to low fiber soft and tolerating well. Denies pain. Denies nausea. Voiding via primofit. BM on bedpan. Midline placed with plan to discharge on IV abx. Rocephin per orders. Wife at bedside and updated on plan of care. Bed in lowest position, call light in reach, frequent rounding, nonskid footwear, fall precautions in place.

## 2023-12-28 VITALS
OXYGEN SATURATION: 93 % | SYSTOLIC BLOOD PRESSURE: 118 MMHG | HEART RATE: 87 BPM | BODY MASS INDEX: 28 KG/M2 | DIASTOLIC BLOOD PRESSURE: 82 MMHG | RESPIRATION RATE: 16 BRPM | TEMPERATURE: 98 F | WEIGHT: 209 LBS

## 2023-12-28 LAB
ALBUMIN SERPL-MCNC: 3.9 G/DL (ref 3.2–4.8)
ALBUMIN/GLOB SERPL: 1.1 {RATIO} (ref 1–2)
ALP LIVER SERPL-CCNC: 120 U/L
ALT SERPL-CCNC: 12 U/L
ANION GAP SERPL CALC-SCNC: 7 MMOL/L (ref 0–18)
AST SERPL-CCNC: 29 U/L (ref ?–34)
BASOPHILS # BLD AUTO: 0.1 X10(3) UL (ref 0–0.2)
BASOPHILS NFR BLD AUTO: 1.1 %
BILIRUB SERPL-MCNC: 0.6 MG/DL (ref 0.2–1.1)
BUN BLD-MCNC: 12 MG/DL (ref 9–23)
BUN/CREAT SERPL: 11.3 (ref 10–20)
CALCIUM BLD-MCNC: 9.2 MG/DL (ref 8.7–10.4)
CHLORIDE SERPL-SCNC: 100 MMOL/L (ref 98–112)
CO2 SERPL-SCNC: 28 MMOL/L (ref 21–32)
CREAT BLD-MCNC: 1.06 MG/DL
DEPRECATED RDW RBC AUTO: 45.5 FL (ref 35.1–46.3)
EGFRCR SERPLBLD CKD-EPI 2021: 80 ML/MIN/1.73M2 (ref 60–?)
EOSINOPHIL # BLD AUTO: 0.29 X10(3) UL (ref 0–0.7)
EOSINOPHIL NFR BLD AUTO: 3.1 %
ERYTHROCYTE [DISTWIDTH] IN BLOOD BY AUTOMATED COUNT: 15.3 % (ref 11–15)
GLOBULIN PLAS-MCNC: 3.5 G/DL (ref 2.8–4.4)
GLUCOSE BLD-MCNC: 107 MG/DL (ref 70–99)
GLUCOSE BLDC GLUCOMTR-MCNC: 117 MG/DL (ref 70–99)
HCT VFR BLD AUTO: 33.4 %
HGB BLD-MCNC: 10.8 G/DL
IMM GRANULOCYTES # BLD AUTO: 0.04 X10(3) UL (ref 0–1)
IMM GRANULOCYTES NFR BLD: 0.4 %
LYMPHOCYTES # BLD AUTO: 2.56 X10(3) UL (ref 1–4)
LYMPHOCYTES NFR BLD AUTO: 27.1 %
MAGNESIUM SERPL-MCNC: 1.6 MG/DL (ref 1.6–2.6)
MCH RBC QN AUTO: 26.3 PG (ref 26–34)
MCHC RBC AUTO-ENTMCNC: 32.3 G/DL (ref 31–37)
MCV RBC AUTO: 81.5 FL
MONOCYTES # BLD AUTO: 0.96 X10(3) UL (ref 0.1–1)
MONOCYTES NFR BLD AUTO: 10.2 %
NEUTROPHILS # BLD AUTO: 5.5 X10 (3) UL (ref 1.5–7.7)
NEUTROPHILS # BLD AUTO: 5.5 X10(3) UL (ref 1.5–7.7)
NEUTROPHILS NFR BLD AUTO: 58.1 %
OSMOLALITY SERPL CALC.SUM OF ELEC: 280 MOSM/KG (ref 275–295)
PLATELET # BLD AUTO: 185 10(3)UL (ref 150–450)
POTASSIUM SERPL-SCNC: 4.1 MMOL/L (ref 3.5–5.1)
PROT SERPL-MCNC: 7.4 G/DL (ref 5.7–8.2)
RBC # BLD AUTO: 4.1 X10(6)UL
SODIUM SERPL-SCNC: 135 MMOL/L (ref 136–145)
WBC # BLD AUTO: 9.5 X10(3) UL (ref 4–11)

## 2023-12-28 RX ORDER — MAGNESIUM OXIDE 400 MG/1
400 TABLET ORAL ONCE
Status: COMPLETED | OUTPATIENT
Start: 2023-12-28 | End: 2023-12-28

## 2023-12-28 NOTE — DISCHARGE INSTRUCTIONS
[facility] will provide [facility]/PT    [address]  Phone: [phone]  Fax: [phone]    [facility] will provide IV meds and supplies    [address]  Phone: [phone]  Fax: [phone]

## 2023-12-28 NOTE — PHYSICAL THERAPY NOTE
PHYSICAL THERAPY TREATMENT NOTE - INPATIENT     Room Number: 453/453-A       Presenting Problem: s/p ivana tube    Problem List  Principal Problem:    Acute cholecystitis  Active Problems:    Hyponatremia    Hypomagnesemia    Constipation, unspecified constipation type      PHYSICAL THERAPY ASSESSMENT   Chart reviewed. ANTONIO Schaffer approved participation in physical therapy. Juju Fallen PPE worn by therapist: mask, gloves, and goggles. Patient was not wearing a mask during session. Patient presented in bed with unrated pain. Patient with fair  progress towards goals during this session. Education provided on Physical therapy plan of care and physiological benefits of out of bed mobility. Patient with fair carryover. Coordinated session with OT to maximize pt outcomes. Wife at bedside providing support and encouragement. Pt agitated and verbally abusive towards therapists throughout session, not receptive to pt education or interventions from therapists, though apologetic at end of session. Patient progressing as demonstrated by improvement in bed mobility and transfers as well as standing balance for pericares. Patient continues to require verbal cueing and redirection to stay on task and for safety. Goals remain in progress. Patient continues to benefit from continued skilled therapy services at discharge to promote functional independence and safety at home with additional support and home-health PT. Patient with fair  progress towards goals and fair carryover during session. Patient continues to function below baseline with bed mobility and gait. Contributing factors to remaining limitations include decreased compliance/participation. Anticipated therapy needs remain appropriate based on the patient's performance, personal factors, and remaining functional impairments. Will continue to follow patient for duration of hospital stay per plan of care.       Bed mobility: Min assist supine to sit  Transfers: Contact guard assist for STS transfers   Gait Assistance: Contact guard assist  Distance (ft): side steps towards [facility]  Assistive Device: Rolling walker  Pattern: Shuffle          Patient was left in bed and alarm activated at end of session with all needs in reach. The patient's Approx Degree of Impairment: 54.16% has been calculated based on documentation in the [facility] '6 clicks' Inpatient Basic Mobility Short Form. Research supports that patients with this level of impairment may benefit from Subacute Rehab, however anticipate pt to make adequate progress towards returning home with family assist and HHPT. RN aware of patient status post session. DISCHARGE RECOMMENDATIONS  PT Discharge Recommendations: 24 hour care/supervision;Home with home health PT     PLAN  PT Treatment Plan: Bed mobility; Body mechanics; Patient education;Gait training;Transfer training;Balance training    SUBJECTIVE  Okay, enough with having an answer for everything    OBJECTIVE  Precautions: None    WEIGHT BEARING RESTRICTION  Weight Bearing Restriction: None                PAIN ASSESSMENT   Rating: Unable to rate     Management Techniques: Activity promotion    BALANCE                                                                                                                       Static Sitting: Good  Dynamic Sitting: Fair +           Static Standing: Fair  Dynamic Standing: Poor +        AM-PAC '6-Clicks' INPATIENT SHORT FORM - BASIC MOBILITY  How much difficulty does the patient currently have. .. Patient Difficulty: Turning over in bed (including adjusting bedclothes, sheets and blankets)?: A Little   Patient Difficulty: Sitting down on and standing up from a chair with arms (e.g., wheelchair, bedside commode, etc.): A Little   Patient Difficulty: Moving from lying on back to sitting on the side of the bed?: A Little   How much help from another person does the patient currently need. ..    Help from Another: Moving to and from a bed to a chair (including a wheelchair)?: A Little   Help from Another: Need to walk in hospital room?: A Lot   Help from Another: Climbing 3-5 steps with a railing?: A Lot     AM-PAC Score:  Raw Score: 16   Approx Degree of Impairment: 54.16%   Standardized Score (AM-PAC Scale): 40.78   CMS Modifier (G-Code): CK        Patient End of Session: Needs met; In bed;RN aware of session/findings;Call light within reach; Alarm set    CURRENT GOALS   Goals to be met by: 1/7/24  Patient Goal Patient's self-stated goal is: to go home   Goal #1 Patient is able to demonstrate supine - sit EOB @ level: independent      Goal #1   Current Status  Min A    Goal #2 Patient is able to demonstrate transfers Sit to/from Stand at assistance level: modified independent with walker - rolling      Goal #2  Current Status  CGA with RW    Goal #3 Patient is able to ambulate 10 feet with assist device: walker - rolling at assistance level: supervision   Goal #3   Current Status  2 ft side steps towards HOB with RW, CGA   Goal #4     Goal #4   Current Status     Goal #5 Patient to demonstrate independence with home activity/exercise instructions provided to patient in preparation for discharge.    Goal #5   Current Status  ongoing    Goal #6     Goal #6  Current Status         Therapeutic Activity: 15 minutes    Samir Mcdonough  [facility]  [phone]

## 2023-12-28 NOTE — CM/SW NOTE
Cm met with pt and spouse to review dc plans and provide choice for inf provider. Wife has a hx of doing IV inf about 15 yr ago but, is agreeable to learn inf process at home and is agreeable to [facility]. CM sent script in aidin and notified [facility] and [facility] of above. Requested soc for tomorrow by noon. RN updated with above, per RN wife will transport pt home. Plan  Home with wife, [address] [place] [address] inf and [facility]    / to remain available for support and/or discharge planning.      Shameka Maria RN    Ext [phone]

## 2023-12-29 ENCOUNTER — PATIENT OUTREACH (OUTPATIENT)
Dept: CASE MANAGEMENT | Age: 63
End: 2023-12-29

## 2023-12-29 NOTE — PROGRESS NOTES
Hospital follow up. Last A1C Value: 9.4% Date: [12/10/2023]    PREM Fajardo  Endocrinology, Diabetes and Metabolism  [facility]  [place] [phone]  Thursday 2/8 @10    Confirmed with patient's spouse. Closing encounter.

## 2024-01-02 NOTE — PAYOR COMM NOTE
--------------  DISCHARGE REVIEW    Payor: [facility]  Subscriber #:  [id number]  Authorization Number: [id number]    Admit date: 12/24/23  Admit time:   8:12 PM  Discharge Date: 12/28/2023  2:09 PM     Admitting Physician: Shashank Castellanos MD  Attending Physician:  No att. providers found  Primary Care Physician: Sanjeev Montanez          Discharge Summary Notes        Discharge Summary signed by Arlet Corona MD at 12/28/2023  1:11 PM       Author: Arlet Corona MD Specialty: HOSPITALIST Author Type: Physician    Filed: 12/28/2023  1:11 PM Date of Service: 12/28/2023  1:04 PM Status: Signed    : Arlet Corona MD (Physician)         [facility] Internal Medicine Discharge Summary   Patient ID:  Rhys Bird  [id number]  63 year old  12/17/1960    Admit date: 12/24/2023    Discharge date and time: 12/28/2023    Attending Physician: Arlet Corona MD     Primary Care Physician: SANJEEV MONTANEZ     Reason for admission possible cholecystitis, bacteremia (see HPI on HP for further detail)    Discharged Condition: stable    Disposition:  Home with IV antibiotics    Risk of Readmission Lace+ Score: 82  59-90 High Risk  29-58 Medium Risk  0-28   Low Risk    Important follow up:  -Patient is to follow-up with Dr. Wilks for intervention for multiple dental.,  Wife is aware  - Follow-up with PCP in 1 week for post hospital stay  -  ID in 2 weeks for completion of antibiotics  - Follow-up with surgery seeking further intervention needed for gallbladder, responded to conservative management with just antibiotics.  Uremia thought to be related to teeth and not gallbladder due to cultures  Discharge meds  I reviewed and reconciled current and discharge medications on the day of discharge with the changes reflected below.       Medication List        START taking these medications      cefTRIAXone in dextrose 5% IVPB premix  Commonly known as: Rocephin  Inject 50 mL (2 g total) into  the vein daily for 12 days. Midline care per protocol. Please draw weekly CBC with diff, CMP and CRP for duration of abx therapy and fax results to [facility] at [phone].     metRONIDAZOLE 500 MG Tabs  Commonly known as: Flagyl  Take 1 tablet (500 mg total) by mouth every 8 (eight) hours for 12 days.            CONTINUE taking these medications      atorvastatin 40 MG Tabs  Commonly known as: Lipitor     citalopram 10 MG Tabs  Commonly known as: CeleXA     ferrous sulfate 325 (65 FE) MG Tbec     folic acid 1 MG Tabs  Commonly known as: Folvite  Take 1 tablet (1 mg total) by mouth daily.     furosemide 40 MG Tabs  Commonly known as: Lasix  Take 0.5 tablets (20 mg total) by mouth daily.     gabapentin 300 MG Caps  Commonly known as: Neurontin     lactulose 10 GM/15ML Soln  Commonly known as: CHRONULAC     magnesium oxide 400 MG Tabs  Commonly known as: Mag-Ox  Take 1 tablet (400 mg total) by mouth 2 (two) times daily.     metFORMIN 500 MG Tabs  Commonly known as: Glucophage  Take 1 tablet (500 mg total) by mouth 2 (two) times daily with meals.     midodrine 10 MG Tabs  Commonly known as: ProAmatine     pantoprazole 40 MG Tbec  Commonly known as: Protonix     rifAXIMin 200 MG Tabs  Commonly known as: Xifaxan     semaglutide 2 MG/3ML Sopn  Commonly known as: Ozempic     spironolactone 25 MG Tabs  Commonly known as: Aldactone  Take 1 tablet (25 mg total) by mouth daily.     sucralfate 1 GM/10ML Susp  Commonly known as: Carafate     thiamine 100 MG Tabs  Commonly known as: Vitamin B1  Take 1 tablet (100 mg total) by mouth daily.            STOP taking these medications      amoxicillin 875 MG Tabs  Commonly known as: Amoxil               Where to Get Your Medications        These medications were sent to [id number] - [place], [place] - [address] AT [facility], [phone], [phone]  [address] , [place] [address]      Phone: [phone]   metRONIDAZOLE  500 MG Tabs       You can get these medications from any pharmacy    Bring a paper prescription for each of these medications  cefTRIAXone in dextrose 5% IVPB premix       HPI per chart  Mr. Bird is a 62 year old male with PMH sig for ETOH cirrhosis, with ascites and hepatic encephalopathy w/ esophageal varices, CKD 3, past ETOH use, type 2 DM recent admission for hyperglycemia started on ozempic who presents with RUQ abd pain, he states starting Saturday night he developed RUQ pain with mild nausea, this was sharp in nature, never happened before, this persisted so he came to hospital for further evaluation , CT revealed non specific but possible acute ivana, was seen by Gen surg who suggested medical management due to his liver disease, started on antibiotics. This morning he has no pain and was asking to try to eat something. He is resting comfortably.     Hospital Course  Patient was seen by general surgery, ID, IR and due to clinical improvement with just IV antibiotics and resolution of right upper quadrant pain decision made to treat with just antibiotics and no drain.  Patient did have a positive HIDA scan.  Patient tolerating diet no right upper quadrant pain.  Did however have positive blood cultures with gram-positive strep bacteremia.  ID was consulted and will DC home on ceftriaxone and p.o. Flagyl.  Discharge Diagnoses:   RUQ abd pain, not clear if acute ivana or biliary colic or other etiology   -Pain resolved, not surgical candidate due to liver disease, would need IR for ivana tub if pain persistent   Follow-up cultures   Changed zosyn to Augmentin  CLD- consider ADAT   Hold on HIDA per Gen surg   Stop IVFs given ascites history   Alk phos and bili elevated, could be from stone but also may be from chronic liver disease as noted previously., now improved   Does not appear to have classic ivana like symptoms  Trend leukocytosis but did have elevated WBC on last admission as well - no other  infectious signs noted   Seen by surgery and IR, medical management for now, if worsens then would need drain      Strep bacteremia  -cont flagyl and ceftriaxone  -dc on ceftriaxone IV until 24     DM2  A1c 9.4 on last admission  Taking metformin and ozempic at home  CF insulin today, may need levemir while inpatient   Endo follow-up      Alcoholic cirrhosis  C/b hepatic encephalopathy, ascites, EV  -Follows with hepatology at [place]  -Hyponatremia noted   -Continue lactulose , lasix, archana, midodrine and rifaximin  -Continue folic acid and thiamine     GERD  -Continue PPI     HLD  -Statin on discharge     Weakness  PT OT     Consults: IP CONSULT TO GENERAL SURGERY  IP CONSULT TO INFECTIOUS DISEASE  IP CONSULT TO INTERVENTIONAL RADIOLOGY  IP CONSULT TO VASCULAR ACCESS TEAM  IP CONSULT TO SOCIAL WORK    Radiology: CARD ECHO 2D DOPPLER CONTRAST (CPT=93306)    Result Date: 2023  Transthoracic Echocardiogram Name:Rhys Bird Date: 2023 :  1960 Ht:  (72in)  BP: 120 / 79 MRN:  [id number]    Age:  63years    Wt:  (209lb) HR: 84bpm Loc:  [facility]       Gndr: M          BSA: 2.17m^2 Sonographer: Buck Ordering:    Shalonda Dominique Consulting:  Rdori Bhakta ---------------------------------------------------------------------------- History/Indications:   Risk factors:  Bacteremia. Dental Infection. Evaluate for vegetation. ---------------------------------------------------------------------------- Procedure information:  A transthoracic complete 2D study was performed. Additional evaluation included M-mode, complete spectral Doppler, and color Doppler.  Patient status:  Inpatient.  Location:  Echo laboratory. Comparison was made to the study of 2023.    This was a routine study. Transthoracic echocardiography for diagnosis and ventricular function evaluation. Image quality was suboptimal. Intravenous contrast (Definity) was administered to evaluate left ventricular function and opacify the  LV. ECG rhythm:   Normal sinus ---------------------------------------------------------------------------- Conclusions: 1. Left ventricle: The cavity size was normal. Wall thickness was mildly    increased. Systolic function was normal. The estimated ejection fraction    was 55-60%, by visual assessment. No diagnostic evidence for regional    wall motion abnormalities. Features are consistent with a pseudonormal    left ventricular filling pattern, with concomitant abnormal relaxation    and increased filling pressure - grade 2 diastolic dysfunction. 2. Mitral valve: The findings were consistent with mild stenosis. The mean    diastolic gradient was 4mm Hg. 3. Thickened and calcified aortic and mitral valve leaflets but no obvious    vegetation seen. Impressions:  This study is compared with previous dated 06/06/2023: * ---------------------------------------------------------------------------- * Findings: Left ventricle:  The cavity size was normal. Wall thickness was mildly increased. Systolic function was normal. The estimated ejection fraction was 55-60%, by visual assessment. No diagnostic evidence for regional wall motion abnormalities. Features are consistent with a pseudonormal left ventricular filling pattern, with concomitant abnormal relaxation and increased filling pressure - grade 2 diastolic dysfunction. Left atrium:  Not well visualized. The left atrial volume was upper normal. Right ventricle:  Not well visualized. Systolic function was normal. Right atrium:  Poorly visualized. Mitral valve:  The leaflets were mildly thickened and mildly calcified. Leaflet separation was normal.  Doppler:   The findings were consistent with mild stenosis.   There was trace regurgitation.    The valve area by pressure half-time was 2.39cm^2. The valve area index by pressure half-time was 1.1cm^2/m^2.    The mean diastolic gradient was 4mm Hg. Aortic valve:  The annulus was mildly calcified. The valve was trileaflet.  The leaflets were mildly thickened and mildly calcified. Cusp separation was normal.  Doppler:  Transvalvular velocity was within the normal range. There was no evidence for stenosis. There was no significant regurgitation. Tricuspid valve:  The valve is structurally normal. Leaflet separation was normal.  Doppler:  Transvalvular velocity was within the normal range. There was no evidence for stenosis. There was trivial regurgitation. Pulmonic valve:   The valve is structurally normal. Cusp separation was normal.  Doppler:  Transvalvular velocity was within the normal range. There was no evidence for stenosis. There was no significant regurgitation. Pericardium:   There was no pericardial effusion. Aorta: Aortic root: The aortic root was normal. Ascending aorta: The ascending aorta was normal. Pulmonary arteries: Systolic pressure was within the normal range, estimated to be 31mm Hg. Systemic veins:  Central venous respirophasic diameter changes are in the normal range (>50%). Inferior vena cava: The IVC was dilated. ---------------------------------------------------------------------------- Measurements  Left ventricle       Value          Ref       06/06/2023  IVS thickness,   (H) 1.1   cm       0.6 - 1.0 0.9  ED, PLAX  LV ID, ED, PLAX  (L) 3.4   cm       4.2 - 5.8 3.9  LV ID, ES, PLAX  (L) 2.2   cm       2.5 - 4.0 2.8  LV PW thickness, (H) 1.1   cm       0.6 - 1.0 0.9  ED, PLAX  IVS/LV PW ratio,     1.00           --------- 1.00  ED, PLAX  LV PW/LV ID          0.32           --------- 0.23  ratio, ED, PLAX  LV ejection          66    %        52 - 72   55  fraction  LV e', lateral   (L) 4.1   cm/sec   >=10.0    5.3  LV E/e', lateral (H) 26             <=13      28  LV e', medial    (L) 3.5   cm/sec   >=7.0     5.4  LV E/e', medial      31             --------- 28  LV e', average       3.8   cm/sec   --------- 5.4  LV E/e', average (H) 28             <=14      28  LVOT                 Value          Ref        06/06/2023  LVOT peak            0.95  m/sec    --------- 1.47  velocity, S  LVOT VTI, S          16.7  cm       --------- 26.6  LVOT peak            4     mm Hg    --------- 9  gradient, S  LVOT mean            2     mm Hg    --------- 5  gradient, S  Aortic root          Value          Ref       06/06/2023  Aortic root ID       3.6   cm       2.8 - 4.3 ----------  Ascending aorta      Value          Ref       06/06/2023  Ascending aorta      3.3   cm       2.2 - 3.8 3.8  ID  Left atrium          Value          Ref       06/06/2023  LA volume, S     (H) 62    ml       18 - 58   105  LA volume/bsa, S     29    ml/m^2   16 - 34   45  LA volume, ES,   (H) 97    ml       18 - 58   106  1-p A4C  LA volume, ES,       35    ml       18 - 58   102  1-p A2C  LA volume, ES,       65    ml       --------- ----------  A/L  LA volume/bsa,       30    ml/m^2   16 - 34   ----------  ES, A/L  Mitral valve         Value          Ref       06/06/2023  Mitral E-wave        1.08  m/sec    --------- 1.5  peak velocity  Mitral A-wave        1.15  m/sec    --------- 1.33  peak velocity  Mitral               339   ms       --------- 229  deceleration  time  Mitral pressure      86    ms       --------- 120  half-time  Mitral mean          4     mm Hg    --------- 6  gradient, D  Mitral peak          7     mm Hg    --------- 12  gradient, D  Mitral E/A           0.9            --------- 1.1  ratio, peak  Mitral valve         2.39  cm^2     --------- 1.83  area, PHT, DP  Mitral valve         1.1   cm^2/m^2 --------- 0.78  area/bsa, PHT,  DP  Pulmonary artery     Value          Ref       06/06/2023  PA pressure, S,      31    mm Hg    --------- ----------  DP  Tricuspid valve      Value          Ref       06/06/2023  Tricuspid regurg     2.38  m/sec    <=2.8     2.5  peak velocity  Tricuspid peak       23    mm Hg    --------- 37  RV-RA gradient  Systemic veins       Value          Ref       06/06/2023  Estimated CVP        8     mm Hg     --------- 8  Inferior vena        Value          Ref       06/06/2023  cava  ID               (H) 2.3   cm       <=2.1     ----------  Right ventricle      Value          Ref       06/06/2023  TAPSE, 2D            2.20  cm       >=1.70    2.00  TAPSE, MM            2.20  cm       >=1.70    1.97  RV pressure, S,      31    mm Hg    --------- 45  DP  RV s', lateral       14.0  cm/sec   >=9.5     13.2 Legend: (L)  and  (H)  holly values outside specified reference range. ---------------------------------------------------------------------------- Prepared and electronically signed by Yehuda Carbajal 12/26/2023 16:53     NM GB HEPATOBILIARY SCAN  (CPT=78226)    Result Date: 12/26/2023  PROCEDURE: NM GB HEPATOBILIARY SCAN (CPT=78226)  COMPARISON: [facility], CT ABDOMEN PELVIS IV CONTRAST NO ORAL (ER), 12/24/2023, 4:06 PM.  INDICATIONS: Right upper quadrant abdominal pain and vomiting  TECHNIQUE: Hepatobiliary imaging was performed after the injection of 5 point millicuries of Technetium-99m Choletec into the left antecubital vein.   FINDINGS:   LIVER: There is normal uptake of radiotracer by the liver on the immediate and 5 minute images.  BILIARY DUCTS: Normal.  The central biliary structures are seen 10 minutes after tracer injection.  GALLBLADDER: Gallbladder is not visualized at at 2 hours. INTESTINE: Normal, with evidence of tracer by the 10 minute image.  This is confirmed on subsequent images.          CONCLUSION:  1. Nonvisualization of gallbladder compatible with acute cholecystitis.    Dictated by (CST): Andrae Camejo MD on 12/26/2023 at 10:59 AM     Finalized by (CST): Andrae Camejo MD on 12/26/2023 at 11:02 AM          CT MAXILLOFACIAL (CPT=70486)    Result Date: 12/25/2023  PROCEDURE: CT MAXILLO FACIAL (CPT=70486)  COMPARISON: [facility], CT STROKE BRAIN (NO IV) (CPT=70450), 10/26/2023, 10:01 PM.  INDICATIONS: Strep sepsis, poor dentition, evaluate for periapical abscess.   TECHNIQUE:   CT images were obtained without intravenous contrast.  Automated exposure control for dose reduction was used.  Dose information is transmitted to the ACR (American College of Radiology) NRDR (National Radiology Data Registry) which includes  the Dose Index Registry.   FINDINGS:  ORBITS: Normal.  No visible mass, hematoma, or fracture.  SINUSES: There is moderate mucosal thickening in the right greater than left maxillary sinus.  FACIAL BONES: Normal.  No bony lesion or fracture.  Mild bilateral TMJ osteoarthritis.  NASAL CAVITY: Normal.  No mass, fracture, or significant septal deviation.  SALIVARY GLANDS: Normal.  The parotid and submandibular glands are unremarkable.  UPPER AIRWAY: Normal.  The nasopharynx, oropharynx, and oral cavity are unremarkable.  NECK: No lymphadenopathy.  OTHER:   There are extensive dental caries present.  There are small periapical lucencies involving right maxillary molars (series 6 image 35).  There is periapical lucency involving right maxillary incisor.  There are numerous dental caries involving most teeth.  There is a small periapical lucency involving right mandibular molar.  There are extremely extensive vascular calcifications present.  Prominence of the ventricles intracranially is partially imaged, similar to the comparison October 2023 CT brain.          CONCLUSION:  Numerous dental caries, as well as periapical lucencies involving right mandibular and maxillary molars as well as right maxillary incisor.  Dental consultation/follow-up is recommended.    Dictated by (CST): Ryann Ellington MD on 12/25/2023 at 8:18 PM     Finalized by (CST): Ryann Ellington MD on 12/25/2023 at 8:23 PM          CT ABDOMEN PELVIS IV CONTRAST, NO ORAL (ER)    Result Date: 12/24/2023  PROCEDURE: CT ABDOMEN PELVIS IV CONTRAST NO ORAL (ER)  COMPARISON: [facility], CT ABDOMEN PELVIS IV CONTRAST NO ORAL (ER), 7/21/2023, 7:52 PM.  [facility], CT ABDOMEN  PELVIS IV CONTRAST NO ORAL (ER), 6/05/2023, 9:05 PM.  [facility], US ASCITES (CPT=76705), 11/06/2023, 7:49 AM.  INDICATIONS: Right upper quadrant abdominal pain and vomiting.  TECHNIQUE: Multidetector CT images of the abdomen and pelvis were obtained with non-ionic intravenous contrast material. Automated exposure control for dose reduction was used. Adjustment of the mA and/or kV was done based on the patient's size. Iterative reconstruction technique for dose reduction was employed. Dose information was transmitted to the ACR (American College of Radiology) NRDR (National Radiology Data Registry), which includes the Dose Index Registry. Oral contrast was not ingested.  FINDINGS: COMMENT: Overall examination quality is degraded by beam hardening artifact throughout the imaged volume secondary to patient body habitus and contact of the right lateral body wall with the scanner gantry. LUNG BASES: The heart is normal in size. Aortic annular and valvular calcifications are observed. Atherosclerotic vascular calcifications are present in the coronary vessels. There is dependent subsegmental atelectasis bilaterally. Additional scattered ground-glass and reticular opacities are present and may be atelectatic in origin. LIVER: Enlarged, measuring 21.2 cm in craniocaudal axis, and diffusely hypoattenuating with areas of relative hyperdensity adjacent to the gallbladder fossa, consistent with hepatic steatosis and areas of fatty sparing. Some degree of hyperemia may also be present adjacent to the gallbladder margin. The liver has nodular contours with hypertrophy of the caudate and left lateral hepatic segments, consistent with cirrhotic morphology. BILIARY: Cholelithiasis is present. There is mild intraluminal dilatation, diffuse gallbladder wall thickening, and surrounding pericholecystic inflammatory stranding. PANCREAS: No lesion, fluid collection, ductal dilatation, or atrophy.  SPLEEN: Enlarged,  measuring 15.3 cm. Scattered granulomatous calcifications are seen. There is a small splenule. ADRENALS:   No defined mass or abnormal enlargement.  KIDNEYS:   Symmetric enhancement is seen without evidence of hydronephrosis or underlying solid masses. GI/MESENTERY:  A moderate hiatal hernia is evident; there is distal esophageal thickening. Significant herniation of ascitic fluid is seen within the hiatal hernia sac. There is no evidence of bowel obstruction. A normal caliber appendix is seen without inflammatory manifestations. A heavy stool burden is demonstrated distally; the rectal vault is distended, measuring 8.2 x 9.7 cm.    Small volume intraperitoneal ascites is appreciated. URINARY BLADDER: No visible calculus or focal wall thickening.  PELVIC NODES: No lymphadenopathy.   PELVIC ORGANS: No visible mass. Pelvic organs appropriate for patient age.  VASCULATURE:   Scattered atherosclerotic vascular calcifications of the abdominal aorta are observed. No aneurysm is detected. RETROPERITONEUM: No mass or lymphadenopathy is apparent.  BONES:   A mild superior endplate compression deformity of L1 is re-identified. Mild right convex curvature of the lower thoracic spine is present. Multilevel degenerative changes are seen throughout the spine. Degenerative changes of the hips are present bilaterally. Remote, healed rib fracture deformities are noted. ABDOMINAL WALL: Small bilateral fat containing inguinal hernias are seen. Dependent subcutaneous edema is present. OTHER: Marked bilateral gynecomastia is partially visualized. No free air is seen in the abdomen or pelvis.           CONCLUSION:  1. Cholelithiasis and imaging findings which are not entirely specific, but raise the possibility of acute cholecystitis.  2. Cirrhotic liver morphology with stigmata of portal hypertension, including substantial splenomegaly and small volume intraperitoneal ascites.   3. Hepatomegaly with suspected underlying hepatic  steatosis.  4. Chronic-appearing compression fracture deformity L1 vertebral segment.  5. The rectal vault is distended with a large fecal burden, perhaps reflecting fecal impaction.   6. Moderate hiatal hernia and distal esophageal thickening; there may be some degree of concurrent esophagitis.   7. Lesser incidental findings as above.    Dictated by (CST): Harinder Kirk MD on 12/24/2023 at 4:32 PM     Finalized by (CST): Harinder Kirk MD on 12/24/2023 at 4:41 PM             Operative Procedures:      Day of discharge as well today, no complaints.  Ready go home    Exam  Vitals:    12/28/23 1040   BP: 118/82   Pulse: 87   Resp: 16   Temp: 97.6 °F (36.4 °C)     No acute distress, alert and orientedx3  Lungs Clear  Heart Regular  Abdomen Benign    Total Time Coordinating Care: > than 30 minutes  Note: This chart was prepared using voice recognition software and may contain unintended word substitution errors.       Patient had opportunity to ask questions and state understand and agree with therapeutic plan as outlined\    Electronically signed by Arlet Corona MD on 12/28/2023  1:11 PM         REVIEWER COMMENTS

## 2024-02-10 ENCOUNTER — LAB ENCOUNTER (OUTPATIENT)
Dept: LAB | Age: 64
End: 2024-02-10
Attending: INTERNAL MEDICINE
Payer: COMMERCIAL

## 2024-02-10 DIAGNOSIS — E55.9 VITAMIN D DEFICIENCY: Primary | ICD-10-CM

## 2024-02-10 DIAGNOSIS — E87.1 HYPO-OSMOLALITY AND HYPONATREMIA: ICD-10-CM

## 2024-02-10 DIAGNOSIS — K70.31 ALCOHOLIC CIRRHOSIS OF LIVER WITH ASCITES (HCC): ICD-10-CM

## 2024-02-10 LAB
AFP-TM SERPL-MCNC: 3.5 NG/ML
ALBUMIN SERPL-MCNC: 4.3 G/DL (ref 3.2–4.8)
ALBUMIN/GLOB SERPL: 1.1 {RATIO} (ref 1–2)
ALP LIVER SERPL-CCNC: 116 U/L
ALT SERPL-CCNC: 20 U/L
ANION GAP SERPL CALC-SCNC: 8 MMOL/L (ref 0–18)
AST SERPL-CCNC: 29 U/L (ref ?–34)
BASOPHILS # BLD AUTO: 0.13 X10(3) UL (ref 0–0.2)
BASOPHILS NFR BLD AUTO: 1.1 %
BILIRUB SERPL-MCNC: 0.8 MG/DL (ref 0.2–1.1)
BUN BLD-MCNC: 21 MG/DL (ref 9–23)
BUN/CREAT SERPL: 16.9 (ref 10–20)
CALCIUM BLD-MCNC: 10.2 MG/DL (ref 8.7–10.4)
CHLORIDE SERPL-SCNC: 103 MMOL/L (ref 98–112)
CO2 SERPL-SCNC: 26 MMOL/L (ref 21–32)
CREAT BLD-MCNC: 1.24 MG/DL
DEPRECATED RDW RBC AUTO: 48.7 FL (ref 35.1–46.3)
EGFRCR SERPLBLD CKD-EPI 2021: 66 ML/MIN/1.73M2 (ref 60–?)
EOSINOPHIL # BLD AUTO: 0.24 X10(3) UL (ref 0–0.7)
EOSINOPHIL NFR BLD AUTO: 2 %
ERYTHROCYTE [DISTWIDTH] IN BLOOD BY AUTOMATED COUNT: 15.5 % (ref 11–15)
FASTING STATUS PATIENT QL REPORTED: YES
GLOBULIN PLAS-MCNC: 3.8 G/DL (ref 2.8–4.4)
GLUCOSE BLD-MCNC: 145 MG/DL (ref 70–99)
HCT VFR BLD AUTO: 41.3 %
HGB BLD-MCNC: 13.4 G/DL
IMM GRANULOCYTES # BLD AUTO: 0.04 X10(3) UL (ref 0–1)
IMM GRANULOCYTES NFR BLD: 0.3 %
INR BLD: 1.05 (ref 0.8–1.2)
LYMPHOCYTES # BLD AUTO: 3.27 X10(3) UL (ref 1–4)
LYMPHOCYTES NFR BLD AUTO: 27 %
MCH RBC QN AUTO: 27.9 PG (ref 26–34)
MCHC RBC AUTO-ENTMCNC: 32.4 G/DL (ref 31–37)
MCV RBC AUTO: 85.9 FL
MONOCYTES # BLD AUTO: 1.1 X10(3) UL (ref 0.1–1)
MONOCYTES NFR BLD AUTO: 9.1 %
NEUTROPHILS # BLD AUTO: 7.32 X10 (3) UL (ref 1.5–7.7)
NEUTROPHILS # BLD AUTO: 7.32 X10(3) UL (ref 1.5–7.7)
NEUTROPHILS NFR BLD AUTO: 60.5 %
OSMOLALITY SERPL CALC.SUM OF ELEC: 290 MOSM/KG (ref 275–295)
PHOSPHATE SERPL-MCNC: 3.9 MG/DL (ref 2.4–5.1)
PLATELET # BLD AUTO: 239 10(3)UL (ref 150–450)
POTASSIUM SERPL-SCNC: 4.6 MMOL/L (ref 3.5–5.1)
PROT SERPL-MCNC: 8.1 G/DL (ref 5.7–8.2)
PROTHROMBIN TIME: 14.4 SECONDS (ref 11.6–14.8)
RBC # BLD AUTO: 4.81 X10(6)UL
SODIUM SERPL-SCNC: 137 MMOL/L (ref 136–145)
URATE SERPL-MCNC: 9.2 MG/DL
VIT D+METAB SERPL-MCNC: 61.4 NG/ML (ref 30–100)
WBC # BLD AUTO: 12.1 X10(3) UL (ref 4–11)

## 2024-02-10 PROCEDURE — 84550 ASSAY OF BLOOD/URIC ACID: CPT

## 2024-02-10 PROCEDURE — 80053 COMPREHEN METABOLIC PANEL: CPT

## 2024-02-10 PROCEDURE — 85610 PROTHROMBIN TIME: CPT

## 2024-02-10 PROCEDURE — 84100 ASSAY OF PHOSPHORUS: CPT

## 2024-02-10 PROCEDURE — 85025 COMPLETE CBC W/AUTO DIFF WBC: CPT

## 2024-02-10 PROCEDURE — 36415 COLL VENOUS BLD VENIPUNCTURE: CPT

## 2024-02-10 PROCEDURE — 82105 ALPHA-FETOPROTEIN SERUM: CPT

## 2024-02-10 PROCEDURE — 82306 VITAMIN D 25 HYDROXY: CPT

## 2024-02-12 ENCOUNTER — OFFICE VISIT (OUTPATIENT)
Dept: SURGERY | Facility: CLINIC | Age: 64
End: 2024-02-12
Payer: COMMERCIAL

## 2024-02-12 VITALS — WEIGHT: 209 LBS | BODY MASS INDEX: 28.31 KG/M2 | HEIGHT: 72 IN

## 2024-02-12 DIAGNOSIS — K76.6 PORTAL HYPERTENSION (HCC): ICD-10-CM

## 2024-02-12 DIAGNOSIS — R16.1 SPLENOMEGALY: ICD-10-CM

## 2024-02-12 DIAGNOSIS — G82.20 PARAPLEGIA (HCC): Primary | ICD-10-CM

## 2024-02-12 DIAGNOSIS — K80.20 GALLSTONES: ICD-10-CM

## 2024-02-12 DIAGNOSIS — K74.60 CIRRHOSIS OF LIVER WITHOUT ASCITES, UNSPECIFIED HEPATIC CIRRHOSIS TYPE (HCC): ICD-10-CM

## 2024-02-12 NOTE — H&P
Chief complaint:   Chief Complaint   Patient presents with    Gallbladder     Referred by Dr. Sanjeev Coker for abdominal pain/gallbladder issues.        HPI: Rhys is a very pleasant 64 yo patient with etoh cirrhosis with esophageal varices, portal hypertension, splenomegaly, neuropathy, who was recently admitted for cholecystitis. He presents for consultation regarding cholecystectomy. He has not had any RUQ pain since his discharge from the hospital.     Past medical history:   Past Medical History:   Diagnosis Date    Alcoholic cirrhosis (HCC)     Esophageal reflux     High blood pressure     High cholesterol     Neuropathy        Past surgical history: History reviewed. No pertinent surgical history.    Allergies: No Known Allergies    Medications:   Current Outpatient Medications   Medication Sig Dispense Refill    semaglutide 2 MG/3ML Subcutaneous Solution Pen-injector Inject 0.25 mg into the skin once a week. On Sundays      atorvastatin 40 MG Oral Tab Take 1 tablet (40 mg total) by mouth nightly.      ferrous sulfate 325 (65 FE) MG Oral Tab EC Take 1 tablet (325 mg total) by mouth daily with breakfast.      furosemide 40 MG Oral Tab Take 0.5 tablets (20 mg total) by mouth daily. 30 tablet 1    spironolactone 25 MG Oral Tab Take 1 tablet (25 mg total) by mouth daily. 30 tablet 0    rifAXIMin 200 MG Oral Tab Take 550 mg by mouth in the morning and 550 mg before bedtime.      lactulose 10 GM/15ML Oral Solution Take 15 mL (10 g total) by mouth daily.      midodrine 10 MG Oral Tab Take 1 tablet (10 mg total) by mouth in the morning and 1 tablet (10 mg total) at noon and 1 tablet (10 mg total) in the evening.      sucralfate 1 GM/10ML Oral Suspension Take 10 mL (1 g total) by mouth 3 (three) times daily before meals.      folic acid 1 MG Oral Tab Take 1 tablet (1 mg total) by mouth daily. 30 tablet 0    Thiamine HCl 100 MG Oral Tab Take 1 tablet (100 mg total) by mouth daily. 30 tablet 0    magnesium oxide 400  MG Oral Tab Take 1 tablet (400 mg total) by mouth 2 (two) times daily. 60 tablet 0    Citalopram Hydrobromide 10 MG Oral Tab Take 1 tablet (10 mg total) by mouth daily.      gabapentin 300 MG Oral Cap Take 1 capsule (300 mg total) by mouth in the morning and 1 capsule (300 mg total) before bedtime.      Pantoprazole Sodium 40 MG Oral Tab EC Take 1 tablet (40 mg total) by mouth daily.         Social history:   Social History     Socioeconomic History    Marital status:    Tobacco Use    Smoking status: Never    Smokeless tobacco: Never   Vaping Use    Vaping Use: Never used   Substance and Sexual Activity    Alcohol use: Yes     Comment: hx of alcohol abuse, last drink december 2022    Drug use: Never        Family history:  History reviewed. No pertinent family history.     Review of Systems:   GENERAL: feels generally well  SKIN: no ulcerated or worrisome skin lesions  EYES:denies blurred vision or double vision  HEENT: denies new nasal congestion, sinus pain or ST  LUNGS: denies shortness of breath with exertion  CARDIOVASCULAR: denies chest pain on exertion  GI: no hematemesis, no BRBPR, no worsening heartburn  : no dysuria, no blood in urine, no difficulty urinating  MUSCULOSKELETAL: no new musculoskeletal complaints  NEURO: no persistent, recurrent  headaches  PSYCHE:no depression or anxiety  HEMATOLOGIC: no hx of blood dyscrasia  ENDOCRINE: no new endocrine problems  ALL/ASTHMA: no new hx of severe allergy or asthma  BACK: normal, no spinal deformity, no CVA tenderness    Physical examination:     Constitutional: appears well hydrated alert and responsive no acute distress noted  HEENT wnl, anicteric, PERRL, normocephalic, atraumatic  Neck supple, norm ROM, no JVD  L CTA B  H Reg rate  Abd soft, NT, ND, no masses, no hernias, no HSM.  Extr no c/c/e  Skin intact, no jaundice, no rashes, no lesions  Back no deformity, no CVA tnd.         Assessment and plan:  Diagnoses and all orders for this  visit:    Paraplegia (HCC)    Cirrhosis of liver without ascites, unspecified hepatic cirrhosis type (HCC)    Gallstones    Splenomegaly    Portal hypertension (HCC)       I have recommended he proceed with consultation at [facility] as he is seeing physicians there. We discussed the complicated nature of his diagnoses, the risks of surgery, and the reasons to undergo surgery at a tertiary care center. All of the patient's and his wife's questions were answered to their satisfaction. They will consult with a surgeon at [facility]. I provided them with names and contact information of the [facility] surgeons. Thank you for the referral.     Dalila Sanchez MD  2/12/2024  11:22 AM

## 2024-02-13 ENCOUNTER — TELEPHONE (OUTPATIENT)
Dept: SURGERY | Facility: CLINIC | Age: 64
End: 2024-02-13

## 2024-02-17 ENCOUNTER — HOSPITAL ENCOUNTER (OUTPATIENT)
Dept: ULTRASOUND IMAGING | Facility: HOSPITAL | Age: 64
Discharge: HOME | End: 2024-02-17
Attending: INTERNAL MEDICINE
Payer: COMMERCIAL

## 2024-02-17 DIAGNOSIS — K70.31 ALCOHOLIC CIRRHOSIS OF LIVER WITH ASCITES (HCC): ICD-10-CM

## 2024-02-17 PROCEDURE — 76705 ECHO EXAM OF ABDOMEN: CPT | Performed by: INTERNAL MEDICINE

## 2024-03-01 ENCOUNTER — OFFICE VISIT (OUTPATIENT)
Dept: ENDOCRINOLOGY CLINIC | Facility: CLINIC | Age: 64
End: 2024-03-01

## 2024-03-01 VITALS
DIASTOLIC BLOOD PRESSURE: 75 MMHG | HEART RATE: 93 BPM | BODY MASS INDEX: 28 KG/M2 | HEIGHT: 72 IN | RESPIRATION RATE: 17 BRPM | SYSTOLIC BLOOD PRESSURE: 106 MMHG

## 2024-03-01 DIAGNOSIS — E11.65 UNCONTROLLED TYPE 2 DIABETES MELLITUS WITH HYPERGLYCEMIA (HCC): Primary | ICD-10-CM

## 2024-03-01 LAB
CARTRIDGE LOT#: ABNORMAL NUMERIC
GLUCOSE BLOOD: 252
HEMOGLOBIN A1C: 5.9 % (ref 4.3–5.6)
TEST STRIP LOT #: NORMAL NUMERIC

## 2024-03-01 PROCEDURE — 3044F HG A1C LEVEL LT 7.0%: CPT

## 2024-03-01 PROCEDURE — 99213 OFFICE O/P EST LOW 20 MIN: CPT

## 2024-03-01 PROCEDURE — 3078F DIAST BP <80 MM HG: CPT

## 2024-03-01 PROCEDURE — 82947 ASSAY GLUCOSE BLOOD QUANT: CPT

## 2024-03-01 PROCEDURE — 3008F BODY MASS INDEX DOCD: CPT

## 2024-03-01 PROCEDURE — 3074F SYST BP LT 130 MM HG: CPT

## 2024-03-01 PROCEDURE — 83036 HEMOGLOBIN GLYCOSYLATED A1C: CPT

## 2024-03-01 NOTE — PROGRESS NOTES
Name: Rhys Bird  Date: 3/1/2024    Referring Physician: No ref. provider found    HISTORY OF PRESENT ILLNESS   Rhys Bird is a 63 year old male who presents for hospital follow up for diabetes     He was hospitalized for hyperglycemia 12/2023 after stopping Ozempic because blood sugars had been very well controlled. He was following with PCP for management of diabetes prior to hospitalization and blood sugars have been well controlled. Medications were restarted on discharge and he reports improved blood sugars at home.     Diabetes History:  Diagnosed- at age 17   Patient has not had hospitalizations for blood sugar issues    Prior glycohemoglobin were: 5.6% 10/2023; 9.4% 12/2023; 5.9% POC today .  Glucose in clinic today - 252 mg/dl     Dietary compliance: Good; avoids fried foods and french fries      Recall:  Breakfast- Ensure or watermelon and strawberries and oatmeal.   Lunch- tacos, burritos   Dinner- mexican foods or soft foods   Beverages- gatorade, ensure, water, Dr. Pepper soda.     Exercise: No  Polyuria/polydipsia: No  Blurred vision: No    Episodes of hypoglycemia: No  Blood Glucose:  Checking 1 times per day    90- 130's     Current DM Regimen:  Metformin 500mg one tablet twice daily   Ozempic- 0.25mg subcutaneous weekly     Modifying factors:  Medication adherence: Yes   Recent steroids, illness or infections: Yes -dental infection - was on antibiotics at time of hospitalization- infection likely contributed to HgA1c of 9% during hospitalization.       REVIEW OF SYSTEMS  Eyes: Diabetic retinopathy present: None known             Most recent visit to eye doctor in last 12 months: No    CV: Cardiovascular disease present: No         Hypertension present: No         Hyperlipidemia present: No         Peripheral Vascular Disease present: No    : Nephropathy present: No    Neuro: Neuropathy present: Yes    Skin: Infection or ulceration: No    Osteoporosis: No    Thyroid disease:  No      Medications:     Current Outpatient Medications:     semaglutide 2 MG/3ML Subcutaneous Solution Pen-injector, Inject 0.25 mg into the skin once a week. On Sundays, Disp: , Rfl:     atorvastatin 40 MG Oral Tab, Take 1 tablet (40 mg total) by mouth nightly., Disp: , Rfl:     ferrous sulfate 325 (65 FE) MG Oral Tab EC, Take 1 tablet (325 mg total) by mouth daily with breakfast., Disp: , Rfl:     furosemide 40 MG Oral Tab, Take 0.5 tablets (20 mg total) by mouth daily., Disp: 30 tablet, Rfl: 1    spironolactone 25 MG Oral Tab, Take 1 tablet (25 mg total) by mouth daily., Disp: 30 tablet, Rfl: 0    rifAXIMin 200 MG Oral Tab, Take 550 mg by mouth in the morning and 550 mg before bedtime., Disp: , Rfl:     lactulose 10 GM/15ML Oral Solution, Take 15 mL (10 g total) by mouth daily., Disp: , Rfl:     midodrine 10 MG Oral Tab, Take 1 tablet (10 mg total) by mouth in the morning and 1 tablet (10 mg total) at noon and 1 tablet (10 mg total) in the evening., Disp: , Rfl:     sucralfate 1 GM/10ML Oral Suspension, Take 10 mL (1 g total) by mouth 3 (three) times daily before meals., Disp: , Rfl:     folic acid 1 MG Oral Tab, Take 1 tablet (1 mg total) by mouth daily., Disp: 30 tablet, Rfl: 0    Thiamine HCl 100 MG Oral Tab, Take 1 tablet (100 mg total) by mouth daily., Disp: 30 tablet, Rfl: 0    magnesium oxide 400 MG Oral Tab, Take 1 tablet (400 mg total) by mouth 2 (two) times daily., Disp: 60 tablet, Rfl: 0    Citalopram Hydrobromide 10 MG Oral Tab, Take 1 tablet (10 mg total) by mouth daily., Disp: , Rfl:     gabapentin 300 MG Oral Cap, Take 1 capsule (300 mg total) by mouth in the morning and 1 capsule (300 mg total) before bedtime., Disp: , Rfl:     Pantoprazole Sodium 40 MG Oral Tab EC, Take 1 tablet (40 mg total) by mouth daily., Disp: , Rfl:      Allergies:   No Known Allergies    Social History:   Social History     Socioeconomic History    Marital status:    Tobacco Use    Smoking status: Never     Smokeless tobacco: Never   Vaping Use    Vaping Use: Never used   Substance and Sexual Activity    Alcohol use: Yes     Comment: hx of alcohol abuse, last drink december 2022    Drug use: Never       Medical History:   Past Medical History:   Diagnosis Date    Alcoholic cirrhosis (HCC)     Esophageal reflux     High blood pressure     High cholesterol     Neuropathy        Surgical history:   No past surgical history on file.      PHYSICAL EXAM  There were no vitals filed for this visit.    General Appearance:  alert, well developed, in no acute distress  Eyes:  normal conjunctivae, sclera, and normal pupils  Ears/Nose/Mouth/Throat/Neck:  no palpable thyroid nodules or cervical lymphadenopathy  Neck: Trachea midline: Normal  Back: no kyphosis or back tenderness  Respiratory:  clear to auscultation bilaterally  Cardiovascular:  regular rate, rhythm, , no murmurs, S3 or S4  Gastrointestinal:  normal bowel sounds and no palpable masses in abdomen, organomegaly or tenderness   Lymph Nodes:  No abnormal nodes noted  Musculoskeletal:  normal muscle strength and tone  Skin:  normal moisture and skin texture  Hair & Nails:  normal scalp hair     Hematologic:  no excessive bruising  Neuro:  sensory grossly intact and motor grossly intact, normal monofilament exam, pedal pulses palpable bilaterally.   Psychiatric:  oriented to time, self, and place  Nutritional:  no abnormal weight gain or loss      ASSESSMENT/PLAN:    Diabetes mellitus type 2 controlled   -HgA1c- 5.9% - significantly improved   -Reviewed ABC's of diabetes   - Reviewed pathogenesis of diabetes.   - Reviewed importance of good glycemic control to prevent microvascular and macrovascular complications including nephropathy, neuropathy, retinopathy, and cardiovascular disease.  - Reviewed importance of SBGM- check glucose 1 time  daily   - Reviewed target glucose goals for patient   fasting and <180 post prandially   - Reviewed importance of following  diabetic diet- recommended 135 grams of CHO per day or 45 grams per meal.   - Provided patient education materials    - Continue Metformin 500mg PO BID.     -Continue Ozempic 0.25mg subcutaneous weekly. Tolerating medication. Reviewed side effects and risks vs benefits of medication.     -normotensive   - Lipids at goal, 6/2023- LDL- 55, on atorvastatin   - No known nephropathy- needs urine microalbumin   -Reviewed importance of yearly optho follow up- encouraged them to schedule appt   -Can follow up with PCP for DM management if preferred.     RTC as needed- can follow up with PCP as discussed   3/1/2024  MUNA Leigh      A total of  25 minutes was spent on obtaining history, reviewing pertinent imaging/labs and specialists notes, evaluating patient, providing multiple treatment options, reinforcing diet/exercise and compliance, and completing documentation.

## 2024-03-06 ENCOUNTER — TELEPHONE (OUTPATIENT)
Facility: CLINIC | Age: 64
End: 2024-03-06

## 2024-05-03 ENCOUNTER — TELEPHONE (OUTPATIENT)
Facility: CLINIC | Age: 64
End: 2024-05-03

## 2024-05-03 NOTE — TELEPHONE ENCOUNTER
Dr Song    Received fax from [facility].    The patient was admitted on 4/26-4/30/2024 for acute pancreatitis.    Hospital discharge summary can be seen in Epic 4/26/2024.    Thank you

## 2024-06-11 ENCOUNTER — TELEPHONE (OUTPATIENT)
Facility: CLINIC | Age: 64
End: 2024-06-11

## 2024-06-11 NOTE — TELEPHONE ENCOUNTER
Dr Song    Received a fax from [facility], the patient was seen by Dr De La Rosa on 6/4/2024.    Office visit notes can be viewed in Care Everywhere.    Thank you

## 2024-06-28 NOTE — TELEPHONE ENCOUNTER
I spoke to the patient's wife, Wily Myrick (on JACQUELINE), patient's /name verified. She stated she received a call back from  [facility] office. Oriented - self; Oriented - place; Oriented - time

## 2024-07-20 ENCOUNTER — LAB ENCOUNTER (OUTPATIENT)
Dept: LAB | Age: 64
End: 2024-07-20
Attending: INTERNAL MEDICINE
Payer: COMMERCIAL

## 2024-07-20 DIAGNOSIS — K74.60 CIRRHOSIS (HCC): Primary | ICD-10-CM

## 2024-07-20 LAB
ALBUMIN SERPL-MCNC: 4.5 G/DL (ref 3.2–4.8)
ALBUMIN/GLOB SERPL: 1.4 {RATIO} (ref 1–2)
ALP LIVER SERPL-CCNC: 141 U/L
ALT SERPL-CCNC: 18 U/L
ANION GAP SERPL CALC-SCNC: 5 MMOL/L (ref 0–18)
AST SERPL-CCNC: 26 U/L (ref ?–34)
BASOPHILS # BLD AUTO: 0.09 X10(3) UL (ref 0–0.2)
BASOPHILS NFR BLD AUTO: 0.9 %
BILIRUB SERPL-MCNC: 0.7 MG/DL (ref 0.2–1.1)
BUN BLD-MCNC: 17 MG/DL (ref 9–23)
BUN/CREAT SERPL: 15.2 (ref 10–20)
CALCIUM BLD-MCNC: 9.6 MG/DL (ref 8.7–10.4)
CHLORIDE SERPL-SCNC: 107 MMOL/L (ref 98–112)
CO2 SERPL-SCNC: 28 MMOL/L (ref 21–32)
CREAT BLD-MCNC: 1.12 MG/DL
DEPRECATED RDW RBC AUTO: 46.4 FL (ref 35.1–46.3)
EGFRCR SERPLBLD CKD-EPI 2021: 75 ML/MIN/1.73M2 (ref 60–?)
EOSINOPHIL # BLD AUTO: 0.22 X10(3) UL (ref 0–0.7)
EOSINOPHIL NFR BLD AUTO: 2.2 %
ERYTHROCYTE [DISTWIDTH] IN BLOOD BY AUTOMATED COUNT: 15.9 % (ref 11–15)
FASTING STATUS PATIENT QL REPORTED: NO
GLOBULIN PLAS-MCNC: 3.2 G/DL (ref 2–3.5)
GLUCOSE BLD-MCNC: 175 MG/DL (ref 70–99)
HCT VFR BLD AUTO: 40.5 %
HGB BLD-MCNC: 12.8 G/DL
IMM GRANULOCYTES # BLD AUTO: 0.03 X10(3) UL (ref 0–1)
IMM GRANULOCYTES NFR BLD: 0.3 %
INR BLD: 1.03 (ref 0.8–1.2)
LYMPHOCYTES # BLD AUTO: 2.64 X10(3) UL (ref 1–4)
LYMPHOCYTES NFR BLD AUTO: 26.7 %
MCH RBC QN AUTO: 25.7 PG (ref 26–34)
MCHC RBC AUTO-ENTMCNC: 31.6 G/DL (ref 31–37)
MCV RBC AUTO: 81.3 FL
MONOCYTES # BLD AUTO: 0.97 X10(3) UL (ref 0.1–1)
MONOCYTES NFR BLD AUTO: 9.8 %
NEUTROPHILS # BLD AUTO: 5.95 X10 (3) UL (ref 1.5–7.7)
NEUTROPHILS # BLD AUTO: 5.95 X10(3) UL (ref 1.5–7.7)
NEUTROPHILS NFR BLD AUTO: 60.1 %
OSMOLALITY SERPL CALC.SUM OF ELEC: 296 MOSM/KG (ref 275–295)
PLATELET # BLD AUTO: 219 10(3)UL (ref 150–450)
POTASSIUM SERPL-SCNC: 4.7 MMOL/L (ref 3.5–5.1)
PROT SERPL-MCNC: 7.7 G/DL (ref 5.7–8.2)
PROTHROMBIN TIME: 14.1 SECONDS (ref 11.6–14.8)
RBC # BLD AUTO: 4.98 X10(6)UL
SODIUM SERPL-SCNC: 140 MMOL/L (ref 136–145)
WBC # BLD AUTO: 9.9 X10(3) UL (ref 4–11)

## 2024-07-20 PROCEDURE — 85610 PROTHROMBIN TIME: CPT

## 2024-07-20 PROCEDURE — 80053 COMPREHEN METABOLIC PANEL: CPT

## 2024-07-20 PROCEDURE — 85025 COMPLETE CBC W/AUTO DIFF WBC: CPT

## 2024-07-20 PROCEDURE — 36415 COLL VENOUS BLD VENIPUNCTURE: CPT

## 2024-09-03 ENCOUNTER — HOSPITAL ENCOUNTER (OUTPATIENT)
Dept: ULTRASOUND IMAGING | Facility: HOSPITAL | Age: 64
Discharge: HOME | End: 2024-09-03
Attending: INTERNAL MEDICINE
Payer: COMMERCIAL

## 2024-09-03 DIAGNOSIS — K70.30 ALCOHOLIC CIRRHOSIS OF LIVER WITHOUT ASCITES (HCC): ICD-10-CM

## 2024-09-03 PROCEDURE — 76705 ECHO EXAM OF ABDOMEN: CPT | Performed by: INTERNAL MEDICINE

## 2024-09-21 ENCOUNTER — LAB ENCOUNTER (OUTPATIENT)
Dept: LAB | Age: 64
End: 2024-09-21
Payer: COMMERCIAL

## 2024-09-21 DIAGNOSIS — E55.9 VITAMIN D DEFICIENCY: ICD-10-CM

## 2024-09-21 DIAGNOSIS — K74.60 CIRRHOSIS (HCC): Primary | ICD-10-CM

## 2024-09-21 DIAGNOSIS — E87.1 HYPO-OSMOLALITY AND HYPONATREMIA: ICD-10-CM

## 2024-09-21 LAB
ALBUMIN SERPL-MCNC: 4.5 G/DL (ref 3.2–4.8)
ALBUMIN/GLOB SERPL: 1.4 {RATIO} (ref 1–2)
ALP LIVER SERPL-CCNC: 139 U/L
ALT SERPL-CCNC: 13 U/L
ANION GAP SERPL CALC-SCNC: 6 MMOL/L (ref 0–18)
AST SERPL-CCNC: 23 U/L (ref ?–34)
BASOPHILS # BLD AUTO: 0.11 X10(3) UL (ref 0–0.2)
BASOPHILS NFR BLD AUTO: 1 %
BILIRUB SERPL-MCNC: 0.7 MG/DL (ref 0.2–1.1)
BUN BLD-MCNC: 20 MG/DL (ref 9–23)
BUN/CREAT SERPL: 18.2 (ref 10–20)
CALCIUM BLD-MCNC: 10.2 MG/DL (ref 8.7–10.4)
CHLORIDE SERPL-SCNC: 104 MMOL/L (ref 98–112)
CO2 SERPL-SCNC: 28 MMOL/L (ref 21–32)
CREAT BLD-MCNC: 1.1 MG/DL
DEPRECATED RDW RBC AUTO: 44.9 FL (ref 35.1–46.3)
EGFRCR SERPLBLD CKD-EPI 2021: 76 ML/MIN/1.73M2 (ref 60–?)
EOSINOPHIL # BLD AUTO: 0.3 X10(3) UL (ref 0–0.7)
EOSINOPHIL NFR BLD AUTO: 2.7 %
ERYTHROCYTE [DISTWIDTH] IN BLOOD BY AUTOMATED COUNT: 15.2 % (ref 11–15)
FASTING STATUS PATIENT QL REPORTED: NO
GLOBULIN PLAS-MCNC: 3.3 G/DL (ref 2–3.5)
GLUCOSE BLD-MCNC: 189 MG/DL (ref 70–99)
HCT VFR BLD AUTO: 43.6 %
HGB BLD-MCNC: 14 G/DL
IMM GRANULOCYTES # BLD AUTO: 0.04 X10(3) UL (ref 0–1)
IMM GRANULOCYTES NFR BLD: 0.4 %
INR BLD: 1 (ref 0.8–1.2)
LYMPHOCYTES # BLD AUTO: 3.68 X10(3) UL (ref 1–4)
LYMPHOCYTES NFR BLD AUTO: 33.1 %
MCH RBC QN AUTO: 26.1 PG (ref 26–34)
MCHC RBC AUTO-ENTMCNC: 32.1 G/DL (ref 31–37)
MCV RBC AUTO: 81.3 FL
MONOCYTES # BLD AUTO: 1.08 X10(3) UL (ref 0.1–1)
MONOCYTES NFR BLD AUTO: 9.7 %
NEUTROPHILS # BLD AUTO: 5.92 X10 (3) UL (ref 1.5–7.7)
NEUTROPHILS # BLD AUTO: 5.92 X10(3) UL (ref 1.5–7.7)
NEUTROPHILS NFR BLD AUTO: 53.1 %
OSMOLALITY SERPL CALC.SUM OF ELEC: 294 MOSM/KG (ref 275–295)
PLATELET # BLD AUTO: 226 10(3)UL (ref 150–450)
POTASSIUM SERPL-SCNC: 4.5 MMOL/L (ref 3.5–5.1)
PROT SERPL-MCNC: 7.8 G/DL (ref 5.7–8.2)
PROTHROMBIN TIME: 13.8 SECONDS (ref 11.6–14.8)
RBC # BLD AUTO: 5.36 X10(6)UL
SODIUM SERPL-SCNC: 138 MMOL/L (ref 136–145)
WBC # BLD AUTO: 11.1 X10(3) UL (ref 4–11)

## 2024-09-21 PROCEDURE — 85025 COMPLETE CBC W/AUTO DIFF WBC: CPT

## 2024-09-21 PROCEDURE — 80053 COMPREHEN METABOLIC PANEL: CPT

## 2024-09-21 PROCEDURE — 85610 PROTHROMBIN TIME: CPT

## 2024-09-21 PROCEDURE — 36415 COLL VENOUS BLD VENIPUNCTURE: CPT

## 2024-09-27 ENCOUNTER — TELEPHONE (OUTPATIENT)
Facility: CLINIC | Age: 64
End: 2024-09-27

## 2024-09-30 NOTE — TELEPHONE ENCOUNTER
Dr. Cast's note reviewed.  The patient was last seen by myself in July 2023.  Management as per [facility].

## 2025-01-18 ENCOUNTER — LAB ENCOUNTER (OUTPATIENT)
Dept: LAB | Age: 65
End: 2025-01-18
Attending: INTERNAL MEDICINE
Payer: COMMERCIAL

## 2025-01-18 DIAGNOSIS — E55.9 VITAMIN D DEFICIENCY: ICD-10-CM

## 2025-01-18 DIAGNOSIS — E87.1 HYPO-OSMOLALITY AND HYPONATREMIA: ICD-10-CM

## 2025-01-18 LAB
CREAT UR-SCNC: 126.3 MG/DL
OSMOLALITY UR: 543 MOSM/KG (ref 300–1300)
PROT UR-MCNC: 15.6 MG/DL (ref ?–14)
PROT/CREAT UR-RTO: 0.12
SODIUM SERPL-SCNC: 66 MMOL/L

## 2025-01-18 PROCEDURE — 84156 ASSAY OF PROTEIN URINE: CPT

## 2025-01-18 PROCEDURE — 84300 ASSAY OF URINE SODIUM: CPT

## 2025-01-18 PROCEDURE — 82570 ASSAY OF URINE CREATININE: CPT

## 2025-01-18 PROCEDURE — 83935 ASSAY OF URINE OSMOLALITY: CPT

## 2025-01-18 PROCEDURE — 81015 MICROSCOPIC EXAM OF URINE: CPT

## 2025-03-01 ENCOUNTER — LAB ENCOUNTER (OUTPATIENT)
Dept: LAB | Facility: REFERENCE LAB | Age: 65
End: 2025-03-01
Attending: INTERNAL MEDICINE
Payer: COMMERCIAL

## 2025-03-01 DIAGNOSIS — K70.30 ALCOHOLIC CIRRHOSIS OF LIVER (HCC): ICD-10-CM

## 2025-03-01 DIAGNOSIS — E55.9 VITAMIN D DEFICIENCY: Primary | ICD-10-CM

## 2025-03-01 DIAGNOSIS — E87.1 HYPO-OSMOLALITY AND HYPONATREMIA: ICD-10-CM

## 2025-03-01 DIAGNOSIS — N18.31 CHRONIC KIDNEY DISEASE, STAGE 3A (HCC): ICD-10-CM

## 2025-03-01 LAB
AFP-TM SERPL-MCNC: 3.5 NG/ML
ALBUMIN SERPL-MCNC: 4.3 G/DL (ref 3.2–4.8)
ALBUMIN/GLOB SERPL: 1.4 {RATIO} (ref 1–2)
ALP LIVER SERPL-CCNC: 133 U/L
ALT SERPL-CCNC: 10 U/L
ANION GAP SERPL CALC-SCNC: 7 MMOL/L (ref 0–18)
AST SERPL-CCNC: 17 U/L (ref ?–34)
BASOPHILS # BLD AUTO: 0.11 X10(3) UL (ref 0–0.2)
BASOPHILS NFR BLD AUTO: 0.9 %
BILIRUB SERPL-MCNC: 0.6 MG/DL (ref 0.2–1.1)
BUN BLD-MCNC: 17 MG/DL (ref 9–23)
BUN/CREAT SERPL: 15.5 (ref 10–20)
CALCIUM BLD-MCNC: 9.4 MG/DL (ref 8.7–10.4)
CHLORIDE SERPL-SCNC: 103 MMOL/L (ref 98–112)
CO2 SERPL-SCNC: 30 MMOL/L (ref 21–32)
CREAT BLD-MCNC: 1.1 MG/DL
DEPRECATED RDW RBC AUTO: 44.1 FL (ref 35.1–46.3)
EGFRCR SERPLBLD CKD-EPI 2021: 76 ML/MIN/1.73M2 (ref 60–?)
EOSINOPHIL # BLD AUTO: 0.22 X10(3) UL (ref 0–0.7)
EOSINOPHIL NFR BLD AUTO: 1.8 %
ERYTHROCYTE [DISTWIDTH] IN BLOOD BY AUTOMATED COUNT: 14.6 % (ref 11–15)
FASTING STATUS PATIENT QL REPORTED: YES
GGT SERPL-CCNC: 27 U/L
GLOBULIN PLAS-MCNC: 3.1 G/DL (ref 2–3.5)
GLUCOSE BLD-MCNC: 133 MG/DL (ref 70–99)
HCT VFR BLD AUTO: 43.6 %
HGB BLD-MCNC: 13.8 G/DL
IMM GRANULOCYTES # BLD AUTO: 0.03 X10(3) UL (ref 0–1)
IMM GRANULOCYTES NFR BLD: 0.3 %
INR BLD: 0.98 (ref 0.8–1.2)
LYMPHOCYTES # BLD AUTO: 3.02 X10(3) UL (ref 1–4)
LYMPHOCYTES NFR BLD AUTO: 25.4 %
MAGNESIUM SERPL-MCNC: 1.7 MG/DL (ref 1.6–2.6)
MCH RBC QN AUTO: 26.3 PG (ref 26–34)
MCHC RBC AUTO-ENTMCNC: 31.7 G/DL (ref 31–37)
MCV RBC AUTO: 83.2 FL
MONOCYTES # BLD AUTO: 1.12 X10(3) UL (ref 0.1–1)
MONOCYTES NFR BLD AUTO: 9.4 %
NEUTROPHILS # BLD AUTO: 7.41 X10 (3) UL (ref 1.5–7.7)
NEUTROPHILS # BLD AUTO: 7.41 X10(3) UL (ref 1.5–7.7)
NEUTROPHILS NFR BLD AUTO: 62.2 %
OSMOLALITY SERPL CALC.SUM OF ELEC: 293 MOSM/KG (ref 275–295)
PHOSPHATE SERPL-MCNC: 4.1 MG/DL (ref 2.4–5.1)
PLATELET # BLD AUTO: 240 10(3)UL (ref 150–450)
POTASSIUM SERPL-SCNC: 4.8 MMOL/L (ref 3.5–5.1)
PROT SERPL-MCNC: 7.4 G/DL (ref 5.7–8.2)
PROTHROMBIN TIME: 13.5 SECONDS (ref 11.6–14.8)
PTH-INTACT SERPL-MCNC: 30.1 PG/ML (ref 18.5–88)
RBC # BLD AUTO: 5.24 X10(6)UL
SODIUM SERPL-SCNC: 140 MMOL/L (ref 136–145)
URATE SERPL-MCNC: 7.6 MG/DL
VIT D+METAB SERPL-MCNC: 58.1 NG/ML (ref 30–100)
WBC # BLD AUTO: 11.9 X10(3) UL (ref 4–11)

## 2025-03-01 PROCEDURE — 83915 ASSAY OF NUCLEOTIDASE: CPT

## 2025-03-01 PROCEDURE — 83735 ASSAY OF MAGNESIUM: CPT

## 2025-03-01 PROCEDURE — 83970 ASSAY OF PARATHORMONE: CPT

## 2025-03-01 PROCEDURE — 85610 PROTHROMBIN TIME: CPT

## 2025-03-01 PROCEDURE — 36415 COLL VENOUS BLD VENIPUNCTURE: CPT

## 2025-03-01 PROCEDURE — 82977 ASSAY OF GGT: CPT

## 2025-03-01 PROCEDURE — 84550 ASSAY OF BLOOD/URIC ACID: CPT

## 2025-03-01 PROCEDURE — 85025 COMPLETE CBC W/AUTO DIFF WBC: CPT

## 2025-03-01 PROCEDURE — 82105 ALPHA-FETOPROTEIN SERUM: CPT

## 2025-03-01 PROCEDURE — 84100 ASSAY OF PHOSPHORUS: CPT

## 2025-03-01 PROCEDURE — 82306 VITAMIN D 25 HYDROXY: CPT

## 2025-03-01 PROCEDURE — 80053 COMPREHEN METABOLIC PANEL: CPT

## 2025-03-06 ENCOUNTER — HOSPITAL ENCOUNTER (INPATIENT)
Facility: HOSPITAL | Age: 65
LOS: 2 days | Discharge: HOME | End: 2025-03-08
Attending: EMERGENCY MEDICINE | Admitting: STUDENT IN AN ORGANIZED HEALTH CARE EDUCATION/TRAINING PROGRAM
Payer: COMMERCIAL

## 2025-03-06 ENCOUNTER — HOSPITAL ENCOUNTER (INPATIENT)
Facility: HOSPITAL | Age: 65
LOS: 2 days | Discharge: HOME HEALTH | End: 2025-03-08
Attending: EMERGENCY MEDICINE | Admitting: STUDENT IN AN ORGANIZED HEALTH CARE EDUCATION/TRAINING PROGRAM
Payer: COMMERCIAL

## 2025-03-06 ENCOUNTER — APPOINTMENT (OUTPATIENT)
Dept: CT IMAGING | Facility: HOSPITAL | Age: 65
End: 2025-03-06
Attending: EMERGENCY MEDICINE
Payer: COMMERCIAL

## 2025-03-06 DIAGNOSIS — E83.42 HYPOMAGNESEMIA: ICD-10-CM

## 2025-03-06 DIAGNOSIS — I63.9 ACUTE CVA (CEREBROVASCULAR ACCIDENT) (HCC): Primary | ICD-10-CM

## 2025-03-06 LAB
ALBUMIN SERPL-MCNC: 4.1 G/DL (ref 3.2–4.8)
ALBUMIN/GLOB SERPL: 1.4 {RATIO} (ref 1–2)
ALP LIVER SERPL-CCNC: 137 U/L
ALT SERPL-CCNC: 10 U/L
AMMONIA PLAS-MCNC: 11 UMOL/L (ref 11–32)
ANION GAP SERPL CALC-SCNC: 7 MMOL/L (ref 0–18)
APTT PPP: 35.2 SECONDS (ref 23–36)
AST SERPL-CCNC: 19 U/L (ref ?–34)
BASOPHILS # BLD AUTO: 0.07 X10(3) UL (ref 0–0.2)
BASOPHILS NFR BLD AUTO: 0.6 %
BILIRUB SERPL-MCNC: 0.5 MG/DL (ref 0.2–1.1)
BUN BLD-MCNC: 12 MG/DL (ref 9–23)
BUN/CREAT SERPL: 12 (ref 10–20)
CALCIUM BLD-MCNC: 8.9 MG/DL (ref 8.7–10.4)
CHLORIDE SERPL-SCNC: 104 MMOL/L (ref 98–112)
CO2 SERPL-SCNC: 28 MMOL/L (ref 21–32)
CREAT BLD-MCNC: 1 MG/DL
DEPRECATED RDW RBC AUTO: 44.1 FL (ref 35.1–46.3)
EGFRCR SERPLBLD CKD-EPI 2021: 85 ML/MIN/1.73M2 (ref 60–?)
EOSINOPHIL # BLD AUTO: 0.22 X10(3) UL (ref 0–0.7)
EOSINOPHIL NFR BLD AUTO: 1.9 %
ERYTHROCYTE [DISTWIDTH] IN BLOOD BY AUTOMATED COUNT: 14.8 % (ref 11–15)
GLOBULIN PLAS-MCNC: 3 G/DL (ref 2–3.5)
GLUCOSE BLD-MCNC: 108 MG/DL (ref 70–99)
GLUCOSE BLDC GLUCOMTR-MCNC: 123 MG/DL (ref 70–99)
HCT VFR BLD AUTO: 41 %
HGB BLD-MCNC: 13.2 G/DL
IMM GRANULOCYTES # BLD AUTO: 0.05 X10(3) UL (ref 0–1)
IMM GRANULOCYTES NFR BLD: 0.4 %
INR BLD: 1 (ref 0.8–1.2)
LYMPHOCYTES # BLD AUTO: 2.56 X10(3) UL (ref 1–4)
LYMPHOCYTES NFR BLD AUTO: 22.7 %
MAGNESIUM SERPL-MCNC: 1.5 MG/DL (ref 1.6–2.6)
MCH RBC QN AUTO: 26.4 PG (ref 26–34)
MCHC RBC AUTO-ENTMCNC: 32.2 G/DL (ref 31–37)
MCV RBC AUTO: 82 FL
MONOCYTES # BLD AUTO: 1.09 X10(3) UL (ref 0.1–1)
MONOCYTES NFR BLD AUTO: 9.7 %
NEUTROPHILS # BLD AUTO: 7.3 X10 (3) UL (ref 1.5–7.7)
NEUTROPHILS # BLD AUTO: 7.3 X10(3) UL (ref 1.5–7.7)
NEUTROPHILS NFR BLD AUTO: 64.7 %
OSMOLALITY SERPL CALC.SUM OF ELEC: 288 MOSM/KG (ref 275–295)
PLATELET # BLD AUTO: 200 10(3)UL (ref 150–450)
POTASSIUM SERPL-SCNC: 4.4 MMOL/L (ref 3.5–5.1)
PROT SERPL-MCNC: 7.1 G/DL (ref 5.7–8.2)
PROTHROMBIN TIME: 13.8 SECONDS (ref 11.6–14.8)
RBC # BLD AUTO: 5 X10(6)UL
SODIUM SERPL-SCNC: 139 MMOL/L (ref 136–145)
WBC # BLD AUTO: 11.3 X10(3) UL (ref 4–11)

## 2025-03-06 PROCEDURE — 70450 CT HEAD/BRAIN W/O DYE: CPT | Performed by: EMERGENCY MEDICINE

## 2025-03-06 RX ORDER — ASPIRIN 81 MG/1
81 TABLET, CHEWABLE ORAL DAILY
Status: DISCONTINUED | OUTPATIENT
Start: 2025-03-06 | End: 2025-03-06

## 2025-03-06 RX ORDER — ASPIRIN 81 MG/1
324 TABLET, CHEWABLE ORAL ONCE
Status: COMPLETED | OUTPATIENT
Start: 2025-03-06 | End: 2025-03-06

## 2025-03-06 RX ORDER — NICOTINE POLACRILEX 4 MG
30 LOZENGE BUCCAL
Status: DISCONTINUED | OUTPATIENT
Start: 2025-03-06 | End: 2025-03-08

## 2025-03-06 RX ORDER — ATORVASTATIN CALCIUM 40 MG/1
40 TABLET, FILM COATED ORAL NIGHTLY
Status: DISCONTINUED | OUTPATIENT
Start: 2025-03-06 | End: 2025-03-08

## 2025-03-06 RX ORDER — DEXTROSE MONOHYDRATE 25 G/50ML
50 INJECTION, SOLUTION INTRAVENOUS
Status: DISCONTINUED | OUTPATIENT
Start: 2025-03-06 | End: 2025-03-08

## 2025-03-06 RX ORDER — LACTULOSE 10 G/15ML
10 SOLUTION ORAL DAILY
Status: DISCONTINUED | OUTPATIENT
Start: 2025-03-07 | End: 2025-03-08

## 2025-03-06 RX ORDER — PROCHLORPERAZINE EDISYLATE 5 MG/ML
5 INJECTION INTRAMUSCULAR; INTRAVENOUS EVERY 8 HOURS PRN
Status: DISCONTINUED | OUTPATIENT
Start: 2025-03-06 | End: 2025-03-08

## 2025-03-06 RX ORDER — MELATONIN
100 DAILY
Status: DISCONTINUED | OUTPATIENT
Start: 2025-03-07 | End: 2025-03-08

## 2025-03-06 RX ORDER — ASPIRIN 81 MG/1
81 TABLET ORAL DAILY
Status: DISCONTINUED | OUTPATIENT
Start: 2025-03-07 | End: 2025-03-08

## 2025-03-06 RX ORDER — NICOTINE POLACRILEX 4 MG
15 LOZENGE BUCCAL
Status: DISCONTINUED | OUTPATIENT
Start: 2025-03-06 | End: 2025-03-08

## 2025-03-06 RX ORDER — HEPARIN SODIUM 5000 [USP'U]/ML
5000 INJECTION, SOLUTION INTRAVENOUS; SUBCUTANEOUS EVERY 8 HOURS SCHEDULED
Status: DISCONTINUED | OUTPATIENT
Start: 2025-03-07 | End: 2025-03-08

## 2025-03-06 RX ORDER — GABAPENTIN 300 MG/1
300 CAPSULE ORAL 2 TIMES DAILY
Status: DISCONTINUED | OUTPATIENT
Start: 2025-03-06 | End: 2025-03-08

## 2025-03-06 RX ORDER — PANTOPRAZOLE SODIUM 40 MG/1
40 TABLET, DELAYED RELEASE ORAL
Status: DISCONTINUED | OUTPATIENT
Start: 2025-03-07 | End: 2025-03-08

## 2025-03-06 RX ORDER — FOLIC ACID 1 MG/1
1 TABLET ORAL DAILY
Status: DISCONTINUED | OUTPATIENT
Start: 2025-03-07 | End: 2025-03-08

## 2025-03-06 RX ORDER — MAGNESIUM OXIDE 400 MG/1
400 TABLET ORAL 2 TIMES DAILY
Status: DISCONTINUED | OUTPATIENT
Start: 2025-03-06 | End: 2025-03-08

## 2025-03-06 RX ORDER — SODIUM CHLORIDE 9 MG/ML
INJECTION, SOLUTION INTRAVENOUS CONTINUOUS
Status: DISCONTINUED | OUTPATIENT
Start: 2025-03-06 | End: 2025-03-07

## 2025-03-06 RX ORDER — MIDODRINE HYDROCHLORIDE 5 MG/1
10 TABLET ORAL 3 TIMES DAILY
Status: DISCONTINUED | OUTPATIENT
Start: 2025-03-07 | End: 2025-03-07

## 2025-03-06 RX ORDER — ACETAMINOPHEN 650 MG/1
650 SUPPOSITORY RECTAL EVERY 4 HOURS PRN
Status: DISCONTINUED | OUTPATIENT
Start: 2025-03-06 | End: 2025-03-08

## 2025-03-06 RX ORDER — ONDANSETRON 2 MG/ML
4 INJECTION INTRAMUSCULAR; INTRAVENOUS EVERY 6 HOURS PRN
Status: DISCONTINUED | OUTPATIENT
Start: 2025-03-06 | End: 2025-03-08

## 2025-03-06 RX ORDER — ACETAMINOPHEN 325 MG/1
650 TABLET ORAL EVERY 4 HOURS PRN
Status: DISCONTINUED | OUTPATIENT
Start: 2025-03-06 | End: 2025-03-08

## 2025-03-06 RX ORDER — ESCITALOPRAM OXALATE 10 MG/1
10 TABLET ORAL DAILY
Status: DISCONTINUED | OUTPATIENT
Start: 2025-03-07 | End: 2025-03-08

## 2025-03-06 RX ORDER — MAGNESIUM SULFATE HEPTAHYDRATE 40 MG/ML
2 INJECTION, SOLUTION INTRAVENOUS ONCE
Status: COMPLETED | OUTPATIENT
Start: 2025-03-06 | End: 2025-03-06

## 2025-03-07 ENCOUNTER — APPOINTMENT (OUTPATIENT)
Dept: MRI IMAGING | Facility: HOSPITAL | Age: 65
End: 2025-03-07
Attending: HOSPITALIST
Payer: COMMERCIAL

## 2025-03-07 ENCOUNTER — APPOINTMENT (OUTPATIENT)
Dept: CT IMAGING | Facility: HOSPITAL | Age: 65
End: 2025-03-07
Attending: Other
Payer: COMMERCIAL

## 2025-03-07 PROBLEM — I10 ESSENTIAL (PRIMARY) HYPERTENSION: Status: RESOLVED | Noted: 2023-03-29 | Resolved: 2025-03-07

## 2025-03-07 PROBLEM — E11.9 TYPE 2 DIABETES MELLITUS WITHOUT COMPLICATIONS (HCC): Status: RESOLVED | Noted: 2023-03-29 | Resolved: 2025-03-07

## 2025-03-07 PROBLEM — E44.0 MODERATE PROTEIN-CALORIE MALNUTRITION (HCC): Status: RESOLVED | Noted: 2023-03-29 | Resolved: 2025-03-07

## 2025-03-07 PROBLEM — F33.9 MAJOR DEPRESSIVE DISORDER, RECURRENT, UNSPECIFIED: Status: RESOLVED | Noted: 2023-04-04 | Resolved: 2025-03-07

## 2025-03-07 PROBLEM — K85.10 GALLSTONE PANCREATITIS (HCC): Status: RESOLVED | Noted: 2025-03-07 | Resolved: 2025-03-07

## 2025-03-07 PROBLEM — M62.81 MUSCLE WEAKNESS (GENERALIZED): Status: RESOLVED | Noted: 2023-03-29 | Resolved: 2025-03-07

## 2025-03-07 PROBLEM — U07.1 COVID-19: Status: RESOLVED | Noted: 2023-04-23 | Resolved: 2025-03-07

## 2025-03-07 PROBLEM — I65.23 OCCLUSION AND STENOSIS OF BILATERAL CAROTID ARTERIES: Status: RESOLVED | Noted: 2023-03-29 | Resolved: 2025-03-07

## 2025-03-07 PROBLEM — G25.5 HEMIBALLISM: Status: ACTIVE | Noted: 2025-03-07

## 2025-03-07 PROBLEM — I95.1 ORTHOSTATIC HYPOTENSION: Status: RESOLVED | Noted: 2023-04-04 | Resolved: 2025-03-07

## 2025-03-07 PROBLEM — I25.10 ATHEROSCLEROTIC HEART DISEASE OF NATIVE CORONARY ARTERY WITHOUT ANGINA PECTORIS: Status: RESOLVED | Noted: 2023-03-29 | Resolved: 2025-03-07

## 2025-03-07 PROBLEM — F10.21 ALCOHOL DEPENDENCE, IN REMISSION (HCC): Status: RESOLVED | Noted: 2023-03-29 | Resolved: 2025-03-07

## 2025-03-07 PROBLEM — R26.2 DIFFICULTY IN WALKING, NOT ELSEWHERE CLASSIFIED: Status: RESOLVED | Noted: 2023-03-29 | Resolved: 2025-03-07

## 2025-03-07 PROBLEM — K21.9 GASTRO-ESOPHAGEAL REFLUX DISEASE WITHOUT ESOPHAGITIS: Status: RESOLVED | Noted: 2023-03-29 | Resolved: 2025-03-07

## 2025-03-07 PROBLEM — K80.50 CHOLEDOCHOLITHIASIS: Status: RESOLVED | Noted: 2025-03-07 | Resolved: 2025-03-07

## 2025-03-07 PROBLEM — G62.1 ALCOHOLIC POLYNEUROPATHY (HCC): Status: RESOLVED | Noted: 2023-03-29 | Resolved: 2025-03-07

## 2025-03-07 PROBLEM — N40.0 BENIGN PROSTATIC HYPERPLASIA WITHOUT LOWER URINARY TRACT SYMPTOMS: Status: RESOLVED | Noted: 2023-03-29 | Resolved: 2025-03-07

## 2025-03-07 PROBLEM — K20.90 ESOPHAGITIS, UNSPECIFIED WITHOUT BLEEDING: Status: RESOLVED | Noted: 2023-03-29 | Resolved: 2025-03-07

## 2025-03-07 PROBLEM — G47.00 INSOMNIA, UNSPECIFIED: Status: RESOLVED | Noted: 2023-04-04 | Resolved: 2025-03-07

## 2025-03-07 LAB
5' NUCLEOTIDASE: 3 IU/L
5' NUCLEOTIDASE: 3 IU/L
AMMONIA PLAS-MCNC: 15 UMOL/L (ref 11–32)
ATRIAL RATE: 84 BPM
CHOLEST SERPL-MCNC: 86 MG/DL (ref ?–200)
EST. AVERAGE GLUCOSE BLD GHB EST-MCNC: 163 MG/DL (ref 68–126)
GLUCOSE BLDC GLUCOMTR-MCNC: 133 MG/DL (ref 70–99)
GLUCOSE BLDC GLUCOMTR-MCNC: 167 MG/DL (ref 70–99)
GLUCOSE BLDC GLUCOMTR-MCNC: 192 MG/DL (ref 70–99)
GLUCOSE BLDC GLUCOMTR-MCNC: 233 MG/DL (ref 70–99)
GLUCOSE BLDC GLUCOMTR-MCNC: 238 MG/DL (ref 70–99)
HBA1C MFR BLD: 7.3 % (ref ?–5.7)
HDLC SERPL-MCNC: 22 MG/DL (ref 40–59)
LDLC SERPL CALC-MCNC: 42 MG/DL (ref ?–100)
MAGNESIUM SERPL-MCNC: 1.7 MG/DL (ref 1.6–2.6)
NONHDLC SERPL-MCNC: 64 MG/DL (ref ?–130)
P AXIS: 28 DEGREES
P-R INTERVAL: 174 MS
Q-T INTERVAL: 386 MS
QRS DURATION: 84 MS
QTC CALCULATION (BEZET): 456 MS
R AXIS: -22 DEGREES
T AXIS: 35 DEGREES
TRIGL SERPL-MCNC: 119 MG/DL (ref 30–149)
TSI SER-ACNC: 2.4 UIU/ML (ref 0.55–4.78)
VENTRICULAR RATE: 84 BPM
VLDLC SERPL CALC-MCNC: 17 MG/DL (ref 0–30)

## 2025-03-07 PROCEDURE — 99233 SBSQ HOSP IP/OBS HIGH 50: CPT | Performed by: HOSPITALIST

## 2025-03-07 PROCEDURE — 70498 CT ANGIOGRAPHY NECK: CPT | Performed by: OTHER

## 2025-03-07 PROCEDURE — 70496 CT ANGIOGRAPHY HEAD: CPT | Performed by: OTHER

## 2025-03-07 PROCEDURE — 3008F BODY MASS INDEX DOCD: CPT | Performed by: HOSPITALIST

## 2025-03-07 PROCEDURE — 95816 EEG AWAKE AND DROWSY: CPT | Performed by: OTHER

## 2025-03-07 PROCEDURE — 99223 1ST HOSP IP/OBS HIGH 75: CPT | Performed by: OTHER

## 2025-03-07 PROCEDURE — 3079F DIAST BP 80-89 MM HG: CPT | Performed by: HOSPITALIST

## 2025-03-07 PROCEDURE — 70551 MRI BRAIN STEM W/O DYE: CPT | Performed by: HOSPITALIST

## 2025-03-07 PROCEDURE — 3075F SYST BP GE 130 - 139MM HG: CPT | Performed by: HOSPITALIST

## 2025-03-07 RX ORDER — MIDODRINE HYDROCHLORIDE 5 MG/1
5 TABLET ORAL 3 TIMES DAILY
Status: DISCONTINUED | OUTPATIENT
Start: 2025-03-07 | End: 2025-03-08

## 2025-03-07 RX ORDER — MAGNESIUM OXIDE 400 MG/1
400 TABLET ORAL ONCE
Status: COMPLETED | OUTPATIENT
Start: 2025-03-07 | End: 2025-03-07

## 2025-03-07 RX ORDER — ASPIRIN 81 MG/1
81 TABLET ORAL DAILY
Qty: 30 TABLET | Refills: 0 | Status: SHIPPED | OUTPATIENT
Start: 2025-03-08

## 2025-03-07 NOTE — SLP NOTE
ADULT SWALLOWING EVALUATION    ASSESSMENT    ASSESSMENT/OVERALL IMPRESSION:  PPE REQUIRED. THIS THERAPIST WORE GLOVES FOR DURATION OF EVALUATION. HANDS WASHED UPON ENTRANCE/EXIT.    Per MD H&P, \"Rhys Bird is a(n) 64 year old male with a history of alcoholic cirrhosis, who present with acute right-sided facial droop and right arm weakness. Patient lives at home with wife. Patient has severe alcoholic cirrhosis, managed by [facility], with last alcohol use in 12/2022. Patient was noticed to have right facial droop between 1652-0390, along with some right arm weakness. There was no confusion, fall, constipation, cough or chest pain.\"    SLP BSSE orders received and acknowledged. A swallow evaluation warranted per stroke protocol. Pt afebrile with clear vocal quality, on room air, with oxygen saturation at 96%. Pt with hx of dysphagia at [facility], BSE 7/22/23 with recommendations for regular/thin liquids.   Pt positioned 90 degrees in bed, alert/cooperative/spouse present. Pt with no complaints of pain. Oral motor skills within functional limits. Pt presented with trials of soft solids and thin liquids via cup. Pt with adequate oral acceptance and bilabial seal across all trials. Pt with intact bite, mastication of solids, and timely A-P transit. Pt's swallow response appears timely with adequate hyolaryngeal elevation/excursion. No clinical signs of aspiration (e.g., immediate/delayed throat clear, immediate/delayed cough, wet vocal quality, increased O2 effort) observed across all trials. No CXR on this admission. Oxygen status remained stable t/o the entire evaluation.     At this time, pt presents with functional oral and probable pharyngeal swallow stages. Recommend a regular diet and thin liquids with strict adherence to safe swallowing compensatory strategies. Results and recommendations reviewed with RN, pt, and family. Pt/pt's family v/u to all results/recommendations. No further swallow services  warranted at this time. Please re consult if needed.        RECOMMENDATIONS   Diet Recommendations - Solids: Regular  Diet Recommendations - Liquids: Thin Liquids                          Medication Administration Recommendations: No restrictions  Treatment Plan/Recommendations: Communication evaluation (pending MRI results)    HISTORY   MEDICAL HISTORY  Reason for Referral: Stroke protocol    Problem List  Principal Problem:    Acute CVA (cerebrovascular accident) (HCC)  Active Problems:    Hypomagnesemia      Past Medical History  Past Medical History:    Alcohol dependence, in remission (HCC)    Alcoholic cirrhosis (HCC)    Alcoholic polyneuropathy (HCC)    Atherosclerotic heart disease of native coronary artery without angina pectoris    Benign prostatic hyperplasia without lower urinary tract symptoms    Choledocholithiasis    COVID-19    Difficulty in walking, not elsewhere classified    Esophageal reflux    Esophagitis, unspecified without bleeding    Essential (primary) hypertension    Gallstone pancreatitis (HCC)    Gastro-esophageal reflux disease without esophagitis    High blood pressure    High cholesterol    Insomnia, unspecified    Major depressive disorder, recurrent, unspecified    Moderate protein-calorie malnutrition (HCC)    Muscle weakness (generalized)    Neuropathy    Neuropathy    Occlusion and stenosis of bilateral carotid arteries    Orthostatic hypotension    Type 2 diabetes mellitus without complications (HCC)       Prior Living Situation: Home with spouse  Diet Prior to Admission: Regular, Thin liquids  Precautions: Aspiration    Patient/Family Goals: did not state    SWALLOWING HISTORY  Current Diet Consistency: Regular, Thin liquids  Dysphagia History: see above  Imaging Results: 3/6 CT BRAIN  CONCLUSION:   1. A questionable acute or subacute left caudate nucleus lacunar infarct.   2. Chronic bilateral basal ganglionic infarcts related to small vessel disease.   3. Chronic left  paramedian pontine infarct related to small vessel disease.   4. Stable chronic changes of small vessel disease in cerebral white matter.   5. Large vessel intracranial atherosclerosis.   6. Cerebral and cerebellar atrophy.         SUBJECTIVE       OBJECTIVE   ORAL MOTOR EXAMINATION  Dentition: Edentulous  Symmetry: Within Functional Limits  Strength: Within Functional Limits     Range of Motion: Within Functional Limits  Rate of Motion: Reduced    Voice Quality: Clear  Respiratory Status: Unlabored  Consistencies Trialed: Thin liquids, Soft solid  Method of Presentation: Self presentation, Cup  Patient Positioned: Upright, Midline    Oral Phase of Swallow: Within Functional Limits                    Pharyngeal Phase of Swallow: Within Functional Limits           (Please note: Silent aspiration cannot be evaluated clinically. Videofluoroscopic Swallow Study is required to rule-out silent aspiration.)    Esophageal Phase of Swallow: No complaints consistent with possible esophageal involvement  Comments: NA          GOALS: NA    FOLLOW UP  Treatment Plan/Recommendations: Communication evaluation (pending MRI results)  Duration: 1 week  Follow Up Needed (Documentation Required): Yes  SLP Follow-up Date: 03/08/25    Thank you for your referral.   If you have any questions, please contact NIKOS Allred M.S. CCC-SLP  Speech Language Pathologist  Phone Number [phone]

## 2025-03-07 NOTE — PROCEDURES
EEG report    REFERRING PHYSICIAN: Kevin Lyle MD    PCP and phone number:  [facility]  [phone]    TECHNIQUE: 21 channels of EEG, 2 channels of EOG, and 1 channel of EKG were recorded utilizing the International 10/20 System. The recording was performed in a digitized monopolar referential format and playback was reformatted into various referential and bipolar montages utilizing appropriate filter settings. Automatic seizure and spike detection programs were utilized throughout the recording.  Video was not recorded during the study    CLINICAL DATA:  Patient is sent for the evaluation of possible seizures.    MEDICATION:  Continuous Medications:   sodium chloride 75 mL/hr at 03/06/25 3465       Scheduled Medications:  No current outpatient medications on file.    PRN Medications:    acetaminophen **OR** acetaminophen    ondansetron    prochlorperazine    glucose **OR** glucose **OR** glucose-vitamin C **OR** dextrose **OR** glucose **OR** glucose **OR** glucose-vitamin C    ACTIVATION:  Hyperventilation: Not done  Photic stimulation: Not done  Sleep: Normal sleep architecture was seen.    BACKGROUND  While the patient was awake, the posterior dominant rhythm consisted of well-regulated 9-10 Hz rhythmic waveforms, symmetrically distributed over both posterior quadrants and was reactive to eye opening.    EEG ABNORMALITY  None    IMPRESSION:  This is a normal EEG. No focal, lateralized, or epileptiform features are noted. Clinical correlation required.

## 2025-03-07 NOTE — ED QUICK NOTES
Patient's spouse states patient had slurred speech and confusion with right side facial droop at 5pm today and stated appeared more tired than normal. Pt facial droop and slurred speech resolved. Pt is currently experiencing involuntary right upper extremity movement.

## 2025-03-07 NOTE — CONSULTS
[facility]  [address] [place] [address], [address]  [facility] [id number]  [phone]          STROKE  CONSULTATION NOTE  [facility]  part of [facility]    Report of Consultation    Rhys Bird Patient Status:  Inpatient     1960 MRN [id number]    Location [facility] 3W/SW Attending Kathi Covington MD    Hosp Day # 1 PCP HE MONTANEZ      Date of Admission:  3/6/2025  Date of Consult:  3/7/2025  Reason for Admission/Consultation: \"Patient came in for weakness and right sided facial droop from home. Patient has history of alcoholic cirrhosis and stays in bed all day. Wife last saw him around 730 am and when she came home from work around 530pm noticed some right facial droop. This resolved in less than minutes she said but he was then making weird movements with his right arm. His NIH here is 0 but his head ct shows an acute vs subacute infarct in the left caudate nucleus. \"    Requested by: Kathi Covington MD  Name of Outside Hospital if Transferred: N/A  Time of patient arrival:  6:23 PM   on 3/6/25   Time of initial page: 9:15 PM on 25         Time of encounter: 11:18 AM   on 25     _________________________________________________________________________________________    Chief Complaint:   Chief Complaint   Patient presents with    Weakness       HPI:  Rhys Bird is a 64 year old man w/ a pmhx sig. for Hx of alcoholism with multiple complications including alcohol associated neuropathy, ascites due to alcoholic cirrhosis, anxiety, depression, hepatic encephalopathy, hyperammonemia, GERD,?  Occlusion and stenosis of bilateral carotid arteries, MCI vs. Alcohol induced dementia, Atrial fibrillation, Prior stroke, HTN, DM, HLD, and CKD,  who is admitted for an infarct left caudate nucleus.    Last known well was 7:30 AM on 3/6/2025.  When his wife found him at 5:30 in the evening on that same day he had a transient right  facial droop.    He had involuntary ?hemiballismus w his right arm in the ED;  now improved  He is slouching to the right per OT/PT     He is known to the neurology service; he was last seen by my colleague Dr. Frias in 2022.    Follows w Dr. Perry at [facility] for neurology.         Vascular Risk Factors:   Atrial fibrillation  Carotid artery stenosis  CKD  Diabetes mellitus  Dyslipidemia  Hypertension  Male sex at birth  Physical inactivity    Prior to admission, taking antithrombotic: No   Agent: ?  Unclear why he is not on antithrombotic.  May be because of his cirrhosis  Prior to admission, taking statin: Yes   Agent: Cholesterol Meds: atorvastatin - 40 MG; atorvastatin Tabs - 40 MG      Prior to admission, taking antihypertensive: No   Agent (s):   Blood Pressure and Cardiac Medications            midodrine 10 MG Oral Tab             Review and summation of prior records  Prior neurology note    \"Patient is a 62 year old male who was admitted to the hospital for Stroke-like symptoms:  Patient who is known to our service from the prior admission in April 2021.  At that time he presented also some altered mental status but also was found to have possible lacunar stroke with hemorrhagic transformation.  Therefore his aspirin Plavix was held for a while at that point.  He does have outpatient neurologist.  At first there was some concern for possible Parkinson's, but there was no significant progression of parkinsonian symptoms.  Therefore it was most likely a seen that he had alcoholic dementia with neuropathy and metabolic abnormalities accounting for most of his problems.      He was brought to the hospital again in December 2022.  With an episode after going out for dinner being more tired than usual, glazed look over his eyes which sometimes happens and therefore he was brought to the hospital.  Ativan was given and there was a thought that he might have improved in terms of his cognition.  1 dose of Keppra  also was given.  -The patient at home was refusing occasional medications especially his potassium.\"    ROS:  Pertinent positive and negatives per HPI.  All others were reviewed and negative.    Past Medical History:    Alcohol dependence, in remission (HCC)    Alcoholic cirrhosis (HCC)    Alcoholic polyneuropathy (HCC)    Atherosclerotic heart disease of native coronary artery without angina pectoris    Benign prostatic hyperplasia without lower urinary tract symptoms    Choledocholithiasis    COVID-19    Difficulty in walking, not elsewhere classified    Esophageal reflux    Esophagitis, unspecified without bleeding    Essential (primary) hypertension    Gallstone pancreatitis (HCC)    Gastro-esophageal reflux disease without esophagitis    High blood pressure    High cholesterol    Insomnia, unspecified    Major depressive disorder, recurrent, unspecified    Moderate protein-calorie malnutrition (HCC)    Muscle weakness (generalized)    Neuropathy    Neuropathy    Occlusion and stenosis of bilateral carotid arteries    Orthostatic hypotension    Type 2 diabetes mellitus without complications (HCC)       History reviewed. No pertinent surgical history.    No family history on file.    Social History     Socioeconomic History    Marital status:      Spouse name: Not on file    Number of children: Not on file    Years of education: Not on file    Highest education level: Not on file   Occupational History    Not on file   Tobacco Use    Smoking status: Never    Smokeless tobacco: Never   Vaping Use    Vaping status: Never Used   Substance and Sexual Activity    Alcohol use: Yes     Comment: hx of alcohol abuse, last drink december 2022    Drug use: Never    Sexual activity: Not on file   Other Topics Concern    Not on file   Social History Narrative    Not on file     Social Drivers of Health     Food Insecurity: No Food Insecurity (3/6/2025)    NCSS - Food Insecurity     Worried About Running Out of Food in  the Last Year: No     Ran Out of Food in the Last Year: No   Transportation Needs: No Transportation Needs (3/6/2025)    NCSS - Transportation     Lack of Transportation: No   Stress: Not on file   Housing Stability: Not At Risk (3/6/2025)    NCSS - Housing/Utilities     Has Housing: Yes     Worried About Losing Housing: No     Unable to Get Utilities: No       Outpatient Medications  Current Outpatient Medications   Medication Instructions    atorvastatin (LIPITOR) 40 mg, Nightly    citalopram (CELEXA) 10 mg, Daily    ferrous sulfate 325 mg, Daily with breakfast    folic acid (FOLVITE) 1 mg, Oral, Daily    furosemide (LASIX) 20 mg, Oral, Daily    gabapentin (NEURONTIN) 300 mg, 2 times daily    lactulose (CHRONULAC) 10 g, Daily    magnesium oxide (MAG-OX) 400 mg, Oral, 2 times daily    metFORMIN 500 MG Oral Tab     midodrine (PROAMATINE) 5 mg, Oral, 3 times daily    pantoprazole (PROTONIX) 40 mg, Daily    rifAXIMin (XIFAXAN) 550 mg, 2 times daily    semaglutide (OZEMPIC) 0.25 mg, Weekly    spironolactone (ALDACTONE) 25 mg, Oral, Daily    sucralfate (CARAFATE) 1 g, 3 times daily before meals    thiamine (VITAMIN B1) 100 mg, Oral, Daily        Inpatient Medications  No current outpatient medications on file.        insulin aspart  1-7 Units Subcutaneous TID CC and HS    midodrine  5 mg Oral TID    heparin  5,000 Units Subcutaneous Q8H NOHELIA    atorvastatin  40 mg Oral Nightly    escitalopram  10 mg Oral Daily    gabapentin  300 mg Oral BID    lactulose  10 g Oral Daily    magnesium oxide  400 mg Oral BID    pantoprazole  40 mg Oral Before breakfast    rifAXIMin  550 mg Oral BID    thiamine  100 mg Oral Daily    folic acid  1 mg Oral Daily    aspirin  81 mg Oral Daily         acetaminophen **OR** acetaminophen    ondansetron    prochlorperazine    glucose **OR** glucose **OR** glucose-vitamin C **OR** dextrose **OR** glucose **OR** glucose **OR** glucose-vitamin C    Objective:  Last vitals and weight :  Vitals:     03/07/25 0400   BP: 102/64   Pulse: 84   Resp: 20   Temp: 98.1 °F (36.7 °C)       Exam:  - General: appears stated age and no distress  - CV: symmetric pulses   Carotids:   - Pulmonary: no signs of respiratory distress. Normal excursion of the chest.   Neurologic Exam  - Mental Status: Alert and attentive. Oriented to  person, month of year, and year.  Speech is spontaneous, fluent, and prosodic. Comprehension and repetition intact. Phrase length and rate are normal. No paraphasic errors, neologisms, anomia, acalculia, apraxia, anosognosia, or R/L confusion. No neglect.   - Cranial Nerves: No gaze preference. Visual fields:normal  Pupils are 5mm briskly constricting to 4mm and equally round and reactive to light  in a well lit room. EOMI. No nystagmus. No ptosis. V1-V3 intact B/L to light touch.No pathological facial asymmetry. No flattening of the nasolabial fold. .  Hearing grossly intact.  Tongue midline. No atrophy or fasiculations of the tongue noted. Palate and uvula elevate symmetrically.  Shoulder shrug symmetric.  - Fundoscopic exam:normal w/o hemorrhages, exudates, or papilledema.No attenuation. No pallor.  - Motor:  normal tone, normal bulk. No interosseous wasting. No flattening of hypothenar eminences.  He does have rare involuntary movements of his right arm.      Right Left     Motor Strength   Deltoids 5 5  Triceps 5 5  Biceps 5 5  Wrist Extensors 5 5      Hip Flexors 5 5          Pronator drift: No pronator drift   Index Rolling: No orbiting.   Finger Taps: Finger taps are symmetric in rate and amplitude.    Rapid movements: Rapid/fine movements are symmetric. As expected their dominant hand is slightly faster.   Leg Drift: None   Foot Taps: Foot taps are symmetric.      Asterixis: No asterixis noted.   Tremor: None     Reflexes:    C5 C6 C7  L4 S1   R 2+ 2+  2+ 2+   L 2+ 2+  2+ 2+   Adductor Spread: No adductor spread noted.    Frontal release signs:Not assessed.    Ronald's sign:absent    Nonsustained clonus: Absent   Sustained clonus: Absent   - Sensory:   Light touch: normal  Temperature: normal  Vibration: normal  - Cerebellum: No truncal ataxia. No titubations. No dysmetria, no dysdiadochokinesis. No overshoot.   - Gait/station: Walks with a walker.  - Plantar response: flexor bilaterally    NIH Stroke Scale  Person Administering Scale: Carlito Lainez DO  1a  Level of consciousness: 0 = Alert keenly responsive    1b. LOC questions:  0 = Answers both questions correctly     1c. LOC commands: 0 = Performs both tasks correctly    2.  Best Gaze: 0 = Normal    3.  Visual: 0 = No visual loss     4. Facial Palsy: 0 = Normal symmetrical movement     5a.  Motor left arm: 0 = No drift; limb holds 90º(or 45º) for full 10 seconds   5b.  Motor right arm: 0 = No drift; limb holds 90º(or 45º) for full 10 seconds   6a. motor left le = No drift; leg holds 30º for full 5 seconds     6b  Motor right le = No drift; leg holds 30º for full 5 seconds     7. Limb Ataxia: 0 = Absent     8.  Sensory: 0 = Normal, no sensory loss     9. Best Language:  0 = No aphasia, normal      10. Dysarthria: 0 = Normal articulation   11. Extinction and Inattention: 0 = No abnormality     Total:   0     Modified Tarrant Score: 3 - Moderate disability. Requires some help, but able to walk unassisted.                                Data reviewed    ECG findings by monitor or 12-lead:  Ecg:   Results for orders placed or performed during the hospital encounter of 25   EKG    Collection Time: 25  7:55 PM   Result Value Ref Range    Ventricular rate 84 BPM    Atrial rate 84 BPM    P-R Interval 174 ms    QRS Duration 84 ms    Q-T Interval 386 ms    QTC Calculation (Bezet) 456 ms    P Axis 28 degrees    R Axis -22 degrees    T Axis 35 degrees       Test results/Imaging:   Lab Results   Component Value Date/Time    TRIG 136 04/15/2021 06:47 PM    HDL 41 04/15/2021 06:47 PM    LDL 88 04/15/2021 06:47 PM     Recent Labs   Lab  03/01/25  0807 03/06/25 1928   WBC 11.9* 11.3*   HGB 13.8 13.2   HCT 43.6 41.0   .0 200.0     Recent Labs   Lab 03/01/25  0807 03/06/25 1928    139   K 4.8 4.4    104   CO2 30.0 28.0   BUN 17 12   ALT 10 10   AST 17 19     Lab Results   Component Value Date    A1C 7.3 (H) 03/06/2025    A1C 5.9 (A) 03/01/2024    A1C 9.4 (H) 12/10/2023     Last A1c value was 7.3% done 3/6/2025.             CT BRAIN OR HEAD (CPT=70450)    Result Date: 3/6/2025  CONCLUSION:  1. A questionable acute or subacute left caudate nucleus lacunar infarct. 2. Chronic bilateral basal ganglionic infarcts related to small vessel disease. 3. Chronic left paramedian pontine infarct related to small vessel disease. 4. Stable chronic changes of small vessel disease in cerebral white matter. 5. Large vessel intracranial atherosclerosis. 6. Cerebral and cerebellar atrophy.    Dictated by (CST): Andrae Camejo MD on 3/06/2025 at 8:29 PM     Finalized by (CST): Andrae Camejo MD on 3/06/2025 at 8:34 PM           Performed an independent visualization of: CT brain WO   Imaging revealed: Agree with radiology read.             Stroke in the the head of the caudate can be due to small vessel disease either involving the recurrent artery of tumor or lenticulostriate vessels.   Likely not on anticoagulant with his prior hemorrhagic transformation of ischemic stroke.    Caudate strokes can cause movement disorders.  Suspect his hemiballismus in his right arm is related to his caudate infarct.  Will improve with time.  Given his history we will also order routine EEG for abnormal movements.  Will start on antiplatelet.  He should be discharged on aspirin 81 mg at a minimum.  Once his MRIs been completed he is stable for discharge.  As an outpatient also recommend anticoagulation for A-fib.  Deferred with outpatient neurologist and cardiologist.    Stroke in the head of the caudate.  Differential Diagnosis:  Small vessel disease         Reperfusion therapy eligibility: patient is not eligible because: out of the  time window  not a candidate for thrombectomy because no large vessel occlusion  Agent and time of first antithrombotic administration (or contraindication):   Aspirin 325 once or rectal aspirin 300 once. Starting tomorrow Aspirin 81 mg daily or rectal aspirin 300 mg daily if still n.p.o.  BP goal:   Permissive hypertension  Additional brain and vascular imaging ordered:   MRI brain WO, CT brain WO, and CTA head/neck    Cardiac imaging: Telemetry and TTE    Additional labs ordered:   Labs: Fasting lipid panel and HgbA1C  Dysphagia status:   DysphagiaNo acute facial droop; per RN swallow evaluation.  Fluids/nutrition:   ivfstroke : AVOID Hypotonic fluids in patients with acute ischemic stroke or cerebral edema.  Hypotonic fluids can worsen cerebral edema.  This includes D5 W, half-normal saline, and lactated Ringer's.  If patients need glucose then please use normal saline.  DVT prophylaxis:   SCD's while in bed  Therapy/rehab services ordered (speech/PT/OT/rehab consult):   Physical therapy, Occupational Therapy, speech therapy evaluations ordered.   maintain oxygen saturation >94%. No supplemental oxygen  in nonhypoxic patients with acute ischemic stroke (AIS).  Tx hyperthermia (temperature >38°C) and identify source  Evidence indicates that persistent in-hospital hyperglycemia during the first 24 hours after AIS is associated with worse outcomes than normoglycemia and thus, it is reasonable to treat hyperglycemia to achieve blood glucose levels in a range of 140 to 180 mg/dL and to closely monitor to prevent hypoglycemia in patients with AIS.  Neuro checks Q4.  Telemetry.NIH stroke scale per protocol.  Other:             This document is not intended to support charting by exception.  Sections left blank in a completed note should be presumed not to have been done.    Level of complexity was based on - interviewing, examining the  patient, establishing a plan of care, as well as review of all neuroimaging and cardiovascular studies.    Disclaimer:   This record was dictated using Dragon software. There may be errors due to voice recognition problems that were not realized and corrected during the completion of the note.       Thank you.  Carlito Lainez DO   Staff Vascular & General Neurology

## 2025-03-07 NOTE — OCCUPATIONAL THERAPY NOTE
OCCUPATIONAL THERAPY EVALUATION - INPATIENT     Room Number: 314/314-A  Evaluation Date: 3/7/2025  Type of Evaluation: Initial   Presenting Problem: weakness; acute L caudate nucleus lacunar infarct  Co-Morbidities: alcoholic cirrhosis, neuropathy     Physician Order: IP Consult to Occupational Therapy  Reason for Therapy: ADL/IADL Dysfunction and Discharge Planning    OCCUPATIONAL THERAPY ASSESSMENT   Patient is a 64 year old male admitted 3/6/2025 for weakness; acute L caudate nucleus lacunar infarct.  Prior to admission, patient's baseline is assist for ADLs from spouse and mod I short distances with RW/long distances with wc.  Patient is currently functioning below baseline with upper body dressing, lower body dressing, transfers, static standing balance, dynamic standing balance, and functional standing tolerance.  Patient is requiring  max assist for ADLs, CGA for bed mobility, and min assist for functional transfers/mobility  as a result of the following impairments: decreased functional strength, decreased endurance, impaired standing balance, impaired coordination, decreased muscular endurance, and medical status. Occupational Therapy will continue to follow for duration of hospitalization.    Patient will benefit from continued skilled OT Services at discharge to promote prior level of function and safety with additional support and return home with Day Rehab.    PLAN DURING HOSPITALIZATION     OT Treatment Plan: Balance activities, Energy conservation/work simplification techniques, ADL training, Functional transfer training, Patient/Family education, Patient/Family training, Equipment eval/education, Compensatory technique education, Fine motor coordination activities     OCCUPATIONAL THERAPY MEDICAL/SOCIAL HISTORY   Problem List  Principal Problem:    Acute CVA (cerebrovascular accident) (HCC)  Active Problems:    Hypomagnesemia    Hemiballism    HOME SITUATION  Type of Home: House  Home Layout: One  level; Ramped entrance  Lives With: Spouse; Caregiver part-time (spouse works full time; homemaker a few days a week)  Toilet and Equipment: 3-in-1 commode  Shower/Tub and Equipment: Tub-shower combo; Shower chair; Other (Comment) (was completing bed bath prior to admission due to decreased strength and difficulty maneuvering RW into bathroom)  Other Equipment: Other (Comment) (adjustable bed)  Drives: No  Patient Regularly Uses: Rolling walker; Wheelchair (wc most of day, RW w/wife or PT)    Prior Level of [place]: Prior to admission pt reports receiving assist for ADLs from spouse. Pt was mod I for short distances using RW and long distances with wc. Pt reports he was completing bed bathing prior to admission and completing toileting using urinals/depends due to difficulty getting in and out of his bathroom. Pt has part-time home care aid who comes for a few hours to assist while his wife is at work.     SUBJECTIVE  \"My right arm just wouldn't stop moving.\"     OCCUPATIONAL THERAPY EXAMINATION      OBJECTIVE  Precautions: Limb alert - left; Limb alert - right; Bed/chair alarm  Fall Risk: High fall risk      PAIN ASSESSMENT  [place]      ACTIVITY TOLERANCE  Pulse: 102  Heart Rate Source: Monitor     BP: 111/73  BP Location: Left arm  BP Method: Automatic  Patient Position: Sitting    O2 SATURATIONS  Oxygen Therapy  SPO2% Ambulation on Room Air: 97    COGNITION  Overall Cognitive Status:  WFL - within functional limits    RANGE OF MOTION   LUE: WFL   RUE: WFL     STRENGTH ASSESSMENT  LUE: WFL   RUE: WFL     COORDINATION  Tests completed:   -FNF, eyes occluded: impaired speed and accuracy bilaterally   -WILLIE (rapid alternating movements): impaired speed bilaterally   -finger opposition: impaired speed and accuracy bilaterally   Comments: Coordination improved with increased time and attention to tasks, but overall requiring increased time to complete tasks and accurate ~75% of the time.       ACTIVITIES OF  DAILY LIVING ASSESSMENT  AM-PAC ‘6-Clicks’ Inpatient Daily Activity Short Form  How much help from another person does the patient currently need…  -   Putting on and taking off regular lower body clothing?: A Lot  -   Bathing (including washing, rinsing, drying)?: A Lot  -   Toileting, which includes using toilet, bedpan or urinal? : A Lot  -   Putting on and taking off regular upper body clothing?: A Little  -   Taking care of personal grooming such as brushing teeth?: A Little  -   Eating meals?: A Little    AM-PAC Score:  Score: 15  Approx Degree of Impairment: 56.46%  Standardized Score (AM-PAC Scale): 34.69  CMS Modifier (G-Code): CK    FUNCTIONAL TRANSFER ASSESSMENT  Sit to Stand: Edge of Bed; Chair  Edge of Bed: Minimal Assist  Chair: Minimal Assist  Simulated toilet transfer: Min assist at RW level   Comments: Pt demo slight posterior lean when completing STS from various surfaces, requiring min assist from therapist to correct.     BED MOBILITY  Supine to Sit : Contact Guard Assist    BALANCE ASSESSMENT  Static Sitting: Stand-by Assist  Static Standing: Minimal Assist  Dynamic sitting: SBA  Dynamic Standing: Min assist      FUNCTIONAL ADL ASSESSMENT  Eating: Supervision  LB Dressing Seated: Maximum Assist  Toileting Seated: Dependent (primofit)  Comments: Pt required max assist to don gym shoes from bed level.       Skilled Therapy Provided:   RN cleared pt for participation. Session coordinated with PT. Pt received in bed with wife present for session. Pt agreeable to participation in therapy. To assess pt's participation during tasks while also providing intermittent education to maximize participation, OT facilitated the following: bed mobility, functional transfers, functional mobility, and ADL tasks. Pt tolerated treatment well and completed all tasks with assist levels listed above. Pt demo WFL strength and ROM in bilateral UE during functional tasks and formal testing. Pt with observed R lean  throughout session, able to self correct 3 times without physical assist, but with verbal cues from therapist. RUE obs to be fidgety throughout space and pt demo difficulty knowing arms position in space when not actively attending to it. Pt remained in recliner with all needs in reach and alarm on at end of session. RN aware.     EDUCATION PROVIDED  Patient Education : Role of Occupational Therapy; Plan of Care; Functional Transfer Techniques; Fall Prevention; Posture/Positioning; Energy Conservation; Proper Body Mechanics  Patient's Response to Education: Verbalized Understanding; Returned Demonstration  Family/Caregiver Education : Role of Occupational Therapy; Plan of Care; Discharge Recommendations  Family/Caregiver's Response to Education: Verbalized Understanding    The patient's Approx Degree of Impairment: 56.46% has been calculated based on documentation in the [facility] '6 clicks' Inpatient Daily Activity Short Form.  Research supports that patients with this level of impairment may benefit from rehab.  Final disposition will be made by interdisciplinary medical team.     Patient End of Session: Up in chair, Needs met, Call light within reach, RN aware of session/findings, All patient questions and concerns addressed, Hospital anti-slip socks, Alarm set, Family present    OT Goals  Patients self stated goal is: none stated      Patient will complete functional transfer with mod I   Comment:     Patient will complete toileting with min assist   Comment:     Patient will tolerate standing for 5 minutes in prep for adls with mod I    Comment:    Patient will complete item retrieval with mod I   Comment:          Goals  on: 3/26/25  Frequency: 3-5x/week    Patient Evaluation Complexity Level:   Occupational Profile/Medical History MODERATE - Expanded review of history including review of medical or therapy record   Specific performance deficits impacting engagement in ADL/IADL MODERATE  3 - 5 performance  deficits   Client Assessment/Performance Deficits HIGH - Comorbidities and significant modifications of tasks    Clinical Decision Making MODERATE - Analysis of occupational profile, detailed assessments, several treatment options    Overall Complexity MODERATE     OT Session Time: 38 minutes  Self-Care Home Management: 38 minutes

## 2025-03-07 NOTE — H&P
[facility]  part of [facility]    History & Physical    Rhys Bird Patient Status:  Emergency    1960 MRN [id number]   Location [facility] EMERGENCY DEPARTMENT Attending Conrad Brown   Hosp Day # 0 PCP HE MONTANEZ     Date:  3/6/2025  Date of Admission:  3/6/2025    History provided by:patient and spouse  Chief Complaint:     Chief Complaint   Patient presents with    Weakness       HPI:   Rhys Bird is a(n) 64 year old male with a history of alcoholic cirrhosis, who present with acute right-sided facial droop and right arm weakness. Patient lives at home with wife. Patient has severe alcoholic cirrhosis, managed by [place] hepatology, with last alcohol use in 2022. Patient was noticed to have right facial droop between [id number], along with some right arm weakness. There was no confusion, fall, constipation, cough or chest pain. In ED, patient has normal vitals, no hypertension or Afib. Blood work unremarkable. Right facial droop and right arm weakness have resolved. NIHSS 0. CT head showed a questionable acute or subacute left caudate nucleus lacunar infarct. Aspirin 324 mg given. Neurology consulted.    History     Past Medical History:    Alcoholic cirrhosis (HCC)    Esophageal reflux    High blood pressure    High cholesterol    Neuropathy    Neuropathy     History reviewed. No pertinent surgical history.  No family history on file.  Social History:  Social History     Socioeconomic History    Marital status:    Tobacco Use    Smoking status: Never    Smokeless tobacco: Never   Vaping Use    Vaping status: Never Used   Substance and Sexual Activity    Alcohol use: Yes     Comment: hx of alcohol abuse, last drink 2022    Drug use: Never     Social Drivers of Health     Food Insecurity: No Food Insecurity (6/3/2024)    Received from [facility]    Hunger Vital Sign     Worried About Running Out of Food in the  Last Year: Never true     Ran Out of Food in the Last Year: Never true   Transportation Needs: No Transportation Needs (6/3/2024)    Received from [facility]    PRAPARE - Transportation     Lack of Transportation (Medical): No     Lack of Transportation (Non-Medical): No   Housing Stability: Low Risk  (6/3/2024)    Received from [facility]    Housing Stability Vital Sign     Unable to Pay for Housing in the Last Year: No     Number of Places Lived in the Last Year: 1     Unstable Housing in the Last Year: No     Allergies/Medications:   Allergies: Allergies[1]  (Not in a hospital admission)      Review of Systems:   Negative except depicted in HPI.    A comprehensive 12 point review of systems was completed.  Pertinent positives and negatives noted in the the HPI.    Physical Exam:   Vital Signs:  Blood pressure 112/73, pulse 88, resp. rate 15, weight 241 lb 6.5 oz (109.5 kg), SpO2 96%.     GENERAL:  The patient appeared to be in no distress.  Lying in bed, comfortably.  SKIN:  Warm and hydrated  HEENT:  Head was atraumatic and normocephalic.  Eyes:  Extraocular muscles were intact.  Sclera was anicteric.  Pupils were equally reactive to light.  Ears:  There were no lesions.  Nose:  No lesions were noted.   NECK:  Supple.  There was no JVD.   CHEST:  Symmetrical movement on inspiration  HEART:  S1 and S2 heard.  RRR   LUNGS:  Air entry was good.  No crackles or wheezes   ABDOMEN: Soft and non-tender.  Bowel sounds were present.  MUSCULOSKELETAL:  There was no deformity.  There was full range of motion in all the extremities.   EXTREMITIES: There was no edema, clubbing or cyanosis  NEUROLOGICAL:  There was no focal deficit.  Cranial nerves II through XII were intact.  PSYCHIATRIC: Calm and cooperative     Results:     Lab Results   Component Value Date    WBC 11.3 (H) 03/06/2025    HGB 13.2 03/06/2025    HCT 41.0 03/06/2025    .0 03/06/2025    CREATSERUM 1.00  03/06/2025    BUN 12 03/06/2025     03/06/2025    K 4.4 03/06/2025     03/06/2025    CO2 28.0 03/06/2025     (H) 03/06/2025    CA 8.9 03/06/2025    ALB 4.1 03/06/2025    ALKPHO 137 (H) 03/06/2025    BILT 0.5 03/06/2025    TP 7.1 03/06/2025    AST 19 03/06/2025    ALT 10 03/06/2025    PTT 43.0 (H) 06/05/2023    INR 0.98 03/01/2025    TSH 3.290 10/27/2023    LIP 50 12/24/2023    GGT 27 03/01/2025    MG 1.5 (L) 03/06/2025    PHOS 4.1 03/01/2025    TROP <0.045 10/14/2020    B12 602 10/27/2023    ETOH <3 12/31/2022       CT BRAIN OR HEAD (CPT=70450)    Result Date: 3/6/2025  CONCLUSION:  1. A questionable acute or subacute left caudate nucleus lacunar infarct. 2. Chronic bilateral basal ganglionic infarcts related to small vessel disease. 3. Chronic left paramedian pontine infarct related to small vessel disease. 4. Stable chronic changes of small vessel disease in cerebral white matter. 5. Large vessel intracranial atherosclerosis. 6. Cerebral and cerebellar atrophy.    Dictated by (CST): Andrae Camejo MD on 3/06/2025 at 8:29 PM     Finalized by (CST): Andrae Camejo MD on 3/06/2025 at 8:34 PM         EKG    Result Date: 3/6/2025  Normal sinus rhythm Normal ECG When compared with ECG of 05-JUN-2023 16:56, Sinus rhythm has replaced Atrial fibrillation Vent. rate has decreased BY  59 BPM Nonspecific T wave abnormality no longer evident in Lateral leads     Assessment/Plan:   Rhys Bird is a 64-year-old male with a history of alcoholic cirrhosis, who present with acute right-sided facial droop and right arm weakness.     # Acute ischemic stroke  - LKN around 03/06/25 at 0730, with about half an hour of right facial droop and right arm weakness  - CT head showed a questionable acute or subacute left caudate nucleus lacunar infarct.  - NIHSS in ED  - MRI brain  - Echo w/ bubble study  - A1c, LDL ant TSH  - PT, OT, SLP  - Tele  - continue home statin, add aspirin  - Neurology consulted    # Alcoholic  cirrhosis  - Last alcohol use 12/2022.  - with repeated large volume paracentesis  - continue home meds    # Hypotension  - off metoprolol, on midodrine    # DM2  - SSI Q6H    # Hypomagnesemia  - mag 1.5, replenished    Diet: NPO  PT/OT: consulted  DVT ppx: heparin  Line: none  Code status: Full   Dispo: expect greater than 2 midnights    MDM: high Ariel Espino MD, PhD  Message via Secure Chat  [facility] Hospitalist         [1] No Known Allergies

## 2025-03-07 NOTE — PLAN OF CARE
Problem: CARDIOVASCULAR - ADULT  Goal: Maintains optimal cardiac output and hemodynamic stability  Description: INTERVENTIONS:  - Monitor vital signs, rhythm, and trends  - Monitor for bleeding, hypotension and signs of decreased cardiac output  - Evaluate effectiveness of vasoactive medications to optimize hemodynamic stability  - Monitor arterial and/or venous puncture sites for bleeding and/or hematoma  - Assess quality of pulses, skin color and temperature  - Assess for signs of decreased coronary artery perfusion - ex. Angina  - Evaluate fluid balance, assess for edema, trend weights  Outcome: Progressing  Goal: Absence of cardiac arrhythmias or at baseline  Description: INTERVENTIONS:  - Continuous cardiac monitoring, monitor vital signs, obtain 12 lead EKG if indicated  - Evaluate effectiveness of antiarrhythmic and heart rate control medications as ordered  - Initiate emergency measures for life threatening arrhythmias  - Monitor electrolytes and administer replacement therapy as ordered  Outcome: Progressing     Problem: METABOLIC/FLUID AND ELECTROLYTES - ADULT  Goal: Glucose maintained within prescribed range  Description: INTERVENTIONS:  - Monitor Blood Glucose as ordered  - Assess for signs and symptoms of hyperglycemia and hypoglycemia  - Administer ordered medications to maintain glucose within target range  - Assess barriers to adequate nutritional intake and initiate nutrition consult as needed  - Instruct patient on self management of diabetes  Outcome: Progressing  Goal: Electrolytes maintained within normal limits  Description: INTERVENTIONS:  - Monitor labs and rhythm and assess patient for signs and symptoms of electrolyte imbalances  - Administer electrolyte replacement as ordered  - Monitor response to electrolyte replacements, including rhythm and repeat lab results as appropriate  - Fluid restriction as ordered  - Instruct patient on fluid and nutrition restrictions as  appropriate  Outcome: Progressing     Problem: NEUROLOGICAL - ADULT  Goal: Achieves stable or improved neurological status  Description: INTERVENTIONS  - Assess for and report changes in neurological status  - Initiate measures to prevent increased intracranial pressure  - Maintain blood pressure and fluid volume within ordered parameters to optimize cerebral perfusion and minimize risk of hemorrhage  - Monitor temperature, glucose, and sodium. Initiate appropriate interventions as ordered  Outcome: Progressing  Goal: Absence of seizures  Description: INTERVENTIONS  - Monitor for seizure activity  - Administer anti-seizure medications as ordered  - Monitor neurological status  Outcome: Progressing     Problem: Impaired Swallowing  Goal: Minimize aspiration risk  Description: Interventions:  - Patient should be alert and upright for all feedings (90 degrees preferred)  - Offer food and liquids at a slow rate  - No straws  - Encourage small bites of food and small sips of liquid  - Offer pills one at a time, crush or deliver with applesauce as needed  - Discontinue feeding and notify MD (or speech pathologist) if coughing or persistent throat clearing or wet/gurgly vocal quality is noted  Outcome: Progressing   Lives at home with wife. Neuro assessments Q2. IVF infusing. Call light within reach, demonstrates understanding of use. Plan is for echo.

## 2025-03-07 NOTE — ED PROVIDER NOTES
Patient Seen in: [facility] Emergency Department      History     Chief Complaint   Patient presents with    Weakness     Stated Complaint: Weakness    Subjective:   HPI      64-year-old male presents for evaluation for weakness, facial droop, abnormal movements of his right upper extremity.  Wife reports that she left for work around 730 this morning.  When she returned home she noticed that he had a right-sided facial droop that lasted for about 30 minutes around 530.  She states that he seemed intermittently weak and fatigued but that now appears to be much better and have more energy.  Patient does report that he is having an unusual movement in his right upper extremity.  He has been compliant with his medications.  He denies any headache, fevers, chills, nausea, vomiting, abdominal pain.  He denies chest pain, shortness of breath.  Wife reports that they did have COVID about 3 weeks ago.    Objective:     Past Medical History:    Alcoholic cirrhosis (HCC)    Esophageal reflux    High blood pressure    High cholesterol    Neuropathy    Neuropathy              History reviewed. No pertinent surgical history.             Social History     Socioeconomic History    Marital status:    Tobacco Use    Smoking status: Never    Smokeless tobacco: Never   Vaping Use    Vaping status: Never Used   Substance and Sexual Activity    Alcohol use: Yes     Comment: hx of alcohol abuse, last drink december 2022    Drug use: Never     Social Drivers of Health     Food Insecurity: No Food Insecurity (6/3/2024)    Received from [facility]    Hunger Vital Sign     Worried About Running Out of Food in the Last Year: Never true     Ran Out of Food in the Last Year: Never true   Transportation Needs: No Transportation Needs (6/3/2024)    Received from [facility]    PRAPARE - Transportation     Lack of Transportation (Medical): No     Lack of Transportation (Non-Medical): No    Housing Stability: Low Risk  (6/3/2024)    Received from [facility]    Housing Stability Vital Sign     Unable to Pay for Housing in the Last Year: No     Number of Places Lived in the Last Year: 1     Unstable Housing in the Last Year: No                  Physical Exam     ED Triage Vitals [03/06/25 1915]   /79   Pulse 86   Resp 16   Temp    Temp src    SpO2 95 %   O2 Device None (Room air)       Current Vitals:   Vital Signs  BP: 112/73  Pulse: 88  Resp: 15  MAP (mmHg): 89    Oxygen Therapy  SpO2: 96 %  O2 Device: None (Room air)        Physical Exam  Vitals and nursing note reviewed.   Constitutional:       Appearance: Normal appearance.   HENT:      Head: Normocephalic and atraumatic.   Eyes:      General: Lids are normal.      Extraocular Movements: Extraocular movements intact.      Pupils: Pupils are equal, round, and reactive to light.   Cardiovascular:      Rate and Rhythm: Normal rate and regular rhythm.      Pulses: Normal pulses.      Heart sounds: Normal heart sounds.   Pulmonary:      Effort: Pulmonary effort is normal.      Breath sounds: Normal breath sounds.   Abdominal:      Tenderness: There is no abdominal tenderness. There is no guarding.   Musculoskeletal:         General: Normal range of motion.      Cervical back: Normal range of motion.   Skin:     General: Skin is warm and dry.   Neurological:      General: No focal deficit present.      Mental Status: He is alert.      Cranial Nerves: No cranial nerve deficit, dysarthria or facial asymmetry.      Sensory: Sensation is intact.      Motor: Motor function is intact.      Coordination: Coordination is intact.      Gait: Gait is intact.      Comments: Abnormal repetitive movements of the RUE             ED Course     Labs Reviewed   CBC WITH DIFFERENTIAL WITH PLATELET - Abnormal; Notable for the following components:       Result Value    WBC 11.3 (*)     Monocyte Absolute 1.09 (*)     All other components within  normal limits   COMP METABOLIC PANEL (14) - Abnormal; Notable for the following components:    Glucose 108 (*)     Alkaline Phosphatase 137 (*)     All other components within normal limits   MAGNESIUM - Abnormal; Notable for the following components:    Magnesium 1.5 (*)     All other components within normal limits   AMMONIA, PLASMA - Normal   PROTHROMBIN TIME (PT)   PTT, ACTIVATED     EKG    Rate, intervals and axes as noted on EKG Report.  Rate: 84  Rhythm: Sinus Rhythm  Reading: no stemi, interpreted by myself           ED Course as of 03/06/25 2145  ------------------------------------------------------------  Time: 03/06 2123  Value: CT BRAIN OR HEAD (CPT=70450)  Comment: Per my independent interpretation, patient's CT Head demonstrates no intracranial hemorrhage.                [facility]          Admission disposition: 3/6/2025  9:22 PM           Medical Decision Making  Differential diagnosis includes but is not limited to CVA, hepatic encephalopathy, seizure, electrolyte disturbance, etc    NIH was 0 on arrival.  Last known well approx 730am.  CBC and chemistry were unremarkable.  Patient's mag was 1.5, IV magnesium was given.  CT concerning for subacute versus acute CVA.  Patient is outside of the therapeutic window for tenecteplase and, NIH is 0, he is not a candidate for thrombectomy.  Patient is given a dose of aspirin and admitted with neurology on consult.    Rhythm Strip: Rate 88 sinus rhythm as interpreted by myself. The cardiac monitor was ordered secondary to the patient's CVA/TIA.     Complicating factors: The patient  has a past medical history of Alcoholic cirrhosis (HCC), Esophageal reflux, High blood pressure, High cholesterol, Neuropathy, and Neuropathy. and  has no past surgical history on file. that contribute to the medical complexity of this ED evaluation.     Medical Record Review: I personally reviewed available prior medical records for any recent pertinent discharge summaries, testing,  and procedures, and reviewed those reports.        Problems Addressed:  Acute CVA (cerebrovascular accident) (HCC): acute illness or injury with systemic symptoms  Hypomagnesemia: acute illness or injury    Amount and/or Complexity of Data Reviewed  Independent Historian: spouse     Details: As per HPI  Labs: ordered. Decision-making details documented in ED Course.  Radiology: ordered and independent interpretation performed. Decision-making details documented in ED Course.  ECG/medicine tests: ordered and independent interpretation performed. Decision-making details documented in ED Course.  Discussion of management or test interpretation with external provider(s): Discussed with Dr. Covington who accepts admission.  Dr. Lainez on with neuro.     Risk  Decision regarding hospitalization.    Critical Care  Total time providing critical care: minutes (43 minutes including time spent examining and re-evaluating the patient, ordering and reviewing laboratory tests, documenting, reviewing previous records, obtaining information from the family, and speaking with consultants, admitting doctors, nurses and medics and excludes any time spent on procedures.  )        Disposition and Plan     Clinical Impression:  1. Acute CVA (cerebrovascular accident) (HCC)    2. Hypomagnesemia         Disposition:  Admit  3/6/2025  9:22 pm    Follow-up:  No follow-up provider specified.        Medications Prescribed:  Current Discharge Medication List              Supplementary Documentation:         Hospital Problems       Present on Admission  Date Reviewed: 3/1/2024            ICD-10-CM Noted POA    * (Principal) Acute CVA (cerebrovascular accident) (HCC) I63.9 3/6/2025 Unknown

## 2025-03-07 NOTE — PHYSICAL THERAPY NOTE
PHYSICAL THERAPY EVALUATION - INPATIENT     Room Number: 314/314-A  Evaluation Date: 3/7/2025  Type of Evaluation: Initial   Physician Order: PT Eval and Treat    Presenting Problem: acute CVA w/weakness, R facial droop and ballistic mvmts RUE  Co-Morbidities : HTN, GERD, h igh cholsterol, severe cirrhosis from alcohol (last drink 2022)  Reason for Therapy: Mobility Dysfunction and Discharge Planning    PHYSICAL THERAPY ASSESSMENT   Patient is a 64 year old male admitted 3/6/2025 for new onset weakness, RUE ballistic movements and facial droop, new acute CVA found.  Prior to admission, patient's baseline is home with his wife in a home with a ramped entrance. He uses a wc during the day and self propels, does walk with wife and RW short distances. She drives him. He does tend to stay in bed in the daytime and gets up when wife gets home and goes to bed late at night/near when wife leaves for work.  Patient is currently functioning below baseline with bed mobility, transfers, gait, maintaining seated position, standing prolonged periods, and wheelchair mobility.  Patient is requiring minimal assist as a result of the following impairments: decreased endurance/aerobic capacity, impaired sitting and standing balance, impaired coordination, impaired motor planning, decreased muscular endurance, medical status, and poor proprioception BLE and neuropathy from mid-calf down through feet .  Physical Therapy will continue to follow for duration of hospitalization.    Patient will benefit from continued skilled PT Services at discharge to promote prior level of function and safety with additional support and return home with home health PT and Day Rehab. Start with HHPT and progress to Day Rehab when able. He is VERY MOTIVATED to return to walking and being more independent at home.    PLAN DURING HOSPITALIZATION  Nursing Mobility Recommendation : 1 Assist  PT Device Recommendation: Rolling walker, Wheelchair  PT Treatment  Plan: Bed mobility, Endurance, Energy conservation, Patient education, Family education, Gait training, Neuromuscular re-educate, Transfer training, Balance training  Rehab Potential : Good  Frequency (Obs): Daily     PHYSICAL THERAPY MEDICAL/SOCIAL HISTORY   History related to current admission: 64-year-old male presents for evaluation for weakness, facial droop, abnormal movements of his right upper extremity.  Wife reports that she left for work around 730 this morning.  When she returned home she noticed that he had a right-sided facial droop that lasted for about 30 minutes around 530.  She states that he seemed intermittently weak and fatigued but that now appears to be much better and have more energy.  Patient does report that he is having an unusual movement in his right upper extremity.  He has been compliant with his medications.  He denies any headache, fevers, chills, nausea, vomiting, abdominal pain.  He denies chest pain, shortness of breath.  Wife reports that they did have COVID about 3 weeks ago.      Problem List  Principal Problem:    Acute CVA (cerebrovascular accident) ([facility])  Active Problems:    Hypomagnesemia    Hemiballism      HOME SITUATION  Type of Home: House  Home Layout: One level, Ramped entrance  Stairs to Enter : 0        Stairs to Bedroom: 0         Lives With: Spouse, Caregiver part-time (spouse works full time; homemaker a few days a week)    Drives: No   Patient Regularly Uses: Rolling walker, Wheelchair (wc most of day, RW w/wife or PT)     Prior Level of [place]: Pt lives with his wife who works full time. He has a CG/homemaker that comes a few days a week for a few hours but he doesn't like her. He self propels around the home in his wc and does transfers to different surfaces. He walks with his wife to assist with RW but only short distances due to fear of falling. He has been making progress with home health earlier this year but now has had COVID and now CVA so has  declined.    SUBJECTIVE  \"I just don't know if my legs are going to work right. It's day to day\"    PHYSICAL THERAPY EXAMINATION   OBJECTIVE  Precautions: Limb alert - left, Limb alert - right, Bed/chair alarm  Fall Risk: High fall risk    WEIGHT BEARING RESTRICTION       PAIN ASSESSMENT  Ratin  Location: no complaints  Management Techniques: Activity promotion    COGNITION  Overall Cognitive Status:  A&Ox3, impulsive with poor insight    RANGE OF MOTION AND STRENGTH ASSESSMENT  Upper extremity ROM and strength are within functional limits but limited  Lower extremity ROM is within functional limits   Lower extremity strength is within functional limits, strength 5/5 but poor coordination and proprioception    BALANCE  Static Sitting: Fair +  Dynamic Sitting: Fair -  Static Standing: Fair -  Dynamic Standing: Poor +    NEUROLOGICAL FINDINGS        Coordination - Rapid Alternating Movement: Left decreased speed, Left decreased accuracy, Right decreased speed, Right decreased accuracy (BLE, impaired proprioception)             ACTIVITY TOLERANCE  Pulse: 102  Heart Rate Source: Monitor     BP: 111/73  BP Location: Left arm  BP Method: Automatic  Patient Position: Sitting    O2 WALK  Oxygen Therapy  SPO2% on Room Air at Rest: 98  SPO2% Ambulation on Room Air: 97    AM-PAC '6-Clicks' INPATIENT SHORT FORM - BASIC MOBILITY  How much difficulty does the patient currently have...  Patient Difficulty: Turning over in bed (including adjusting bedclothes, sheets and blankets)?: A Little   Patient Difficulty: Sitting down on and standing up from a chair with arms (e.g., wheelchair, bedside commode, etc.): A Little   Patient Difficulty: Moving from lying on back to sitting on the side of the bed?: A Little   How much help from another person does the patient currently need...   Help from Another: Moving to and from a bed to a chair (including a wheelchair)?: A Little   Help from Another: Need to walk in hospital room?: A  Little   Help from Another: Climbing 3-5 steps with a railing?: Total     AM-PAC Score:  Raw Score: 16   Approx Degree of Impairment: 54.16%   Standardized Score (AM-PAC Scale): 40.78   CMS Modifier (G-Code): CK    FUNCTIONAL ABILITY STATUS  Functional Mobility/Gait Assessment  Gait Assistance: Minimum assistance, Moderate assistance  Distance (ft): 5  Assistive Device: Rolling walker  Pattern: Shuffle  Rolling: supervision  Supine to Sit: contact guard assist  Sit to Supine: contact guard assist  Sit to Stand: minimal assist    Exercise/Education Provided:  Body mechanics  Energy conservation  Functional activity tolerated  Gait training  Neuromuscular re-educate  Posture  Transfer training    Skilled Therapy Provided: RN approves participation in therapy session. Patient presents in bed and agreeable to therapy. Wife is present throughout session and offers additional information. Patient has limited mobility at baseline due to frequent falls. He was progressing with OP PT but that is now done. He had COVID a few weeks ago and  has declined since then per wife. He was able to walk up to 30' at a time with RW and chair follow. He has significant BLE neuropathy.     Currently he is needing a bit of assist for bed mobility, increased assist for sit to stand and walking with instability. He has a moderate right trunk lean in sitting and standing and also ballistic mvmts of RUE as well as impaired awareness of where his right arm is and what it is doing. He is noted to have poor eccentric control to stand and sit using momentum and moderate reliance on BUE. He is below his baseline and is motivated and demonstrates the potential to make gains to return to walking. RN is aware of session. He is up in chair with all needs in reach and alarm set, wife in room.    The patient's Approx Degree of Impairment: 54.16% has been calculated based on documentation in the [facility] '6 clicks' Inpatient Basic Mobility Short Form.   Research supports that patients with this level of impairment may benefit from inpatient rehab. However, pt uses wc for self propelled mobility at baseline and has been working to progress gait at home. Final disposition will be made by interdisciplinary medical team.    Patient End of Session: Up in chair, Needs met, Call light within reach, RN aware of session/findings, All patient questions and concerns addressed, Alarm set, Family present, Discussed recommendations with / (pt wearing tennis shoes)    CURRENT GOALS  Goals to be met by: 3/15/25  Patient Goal Patient's self-stated goal is: to get better   Goal #1 Patient is able to demonstrate supine - sit EOB @ level: modified independent     Goal #1   Current Status    Goal #2 Patient is able to demonstrate transfers Sit to/from Stand at assistance level: supervision with walker - rolling     Goal #2  Current Status    Goal #3 Patient is able to ambulate 15 feet with assist device: walker - rolling at assistance level: minimum assistance   Goal #3   Current Status    Goal #4 Patient will negotiate bed to/from chair transfers with RW and supervision   Goal #4   Current Status    Goal #5 Patient to demonstrate independence with home activity/exercise instructions provided to patient in preparation for discharge.   Goal #5   Current Status    Goal #6    Goal #6  Current Status      Patient Evaluation Complexity Level:  History High - 3 or more personal factors and/or co-morbidities   Examination of body systems Moderate - addressing a total of 3 or more elements   Clinical Presentation  Moderate - Evolving   Clinical Decision Making  Moderate Complexity     Therapeutic Activity:  31 minutes

## 2025-03-07 NOTE — CM/SW NOTE
03/07/25 1300   CM/SW Referral Data   Referral Source Physician   Reason for Referral Protocol order set   Specify order set Stroke   Informant Patient;Spouse/Significant Other   Medical Hx   Does patient have an established PCP? Yes  (Sanjeev Jj)   Patient Info   Patient's Current Mental Status at Time of Assessment Alert;Oriented   Patient's Home Environment House   Number of Levels in Home 1   Number of Stair in Home 0   Patient lives with Spouse/Significant other   Patient Status Prior to Admission   Independent with ADLs and Mobility No   Pt. requires assistance with Housework;Driving;Ambulating;Bathing   Services in place prior to admission DME/Supplies at home;[facility]   Type of DME/Supplies Standard Walker;Wheelchair;Commode;Shower Chair;Grab Bars;Ramp  (guard rails on bed)   senior care Home Care Provider Department on Aging   senior care Care hours per day 4-5   senior care Care days per week 5   Discharge Needs   Anticipated D/C needs Home health care;Day rehab   Choice of Post-Acute Provider   Informed patient of right to choose their preferred provider Yes   List of appropriate post-acute services provided to patient/family with quality data No - Declined list   Patient/family choice [facility]     SW received MDO for HH Evaluation per protocol w/ stroke w/up.  BILL consulted w/ PT/OT - confirmed Anticipated therapy need: Home with Day Rehab and possible HH while waiting for appointment.    Per PT, pt will have difficulty w/ transportation for Day Rehab as his wife works.    BILL met w/ pt and spouse at bedside. Above assessment completed.  Verified home address and confirmed it is just the two of them living there.  Confirmed pt stays on main level. There is a ramp entrance.    BILL discussed Day Rehab and possible HH in the interim.   Confirmed pt does have a CG that comes through Dept of Aging a total of 22hrs/week. Per pt's wife, the CG comes 3 days/week for  5hrs/day and then 2 days/week for 4hrs/day.    Pt's wife is trying to figure out logistics of pt's CG's hours and Day Rehab. SW discussed HH while waiting for Day Rehab and/or whil figuring out CG schedule.    Pt and spouse are agreeable to that plan. They are requesting [facility] which pt previously used a few months back.     SW discussed Day Rehab options and next steps. They are agreeable to [facility] as that is closest to their home.    Next steps and referral process explained. All questions addressed/answered.    SW provided information for [facility], [facility] [employer], and [place] [facility].    Below resources also added to pt's AVS:    Based on your assessment and our conversation today we've agreed on the following resource recommendations.     Recommended Community Resources:  Continuing Care   [facility]    [address] [place] [address]    Phone: [phone]    Website: [email or web address]    Request Status: Pending - No Request Sent    Services: [employer], Transportation    Resource for: Transportation Needs    Languages: English    Cost: Free    Hours of Operation     Sunday --     Monday  8:30 AM -  2:30 PM     Tuesday  8:30 AM -  2:30 PM     Wednesday  8:30 AM -  2:30 PM     Thursday  8:30 AM -  2:30 PM     Friday  8:30 AM -  2:30 PM     Saturday --   [facility] - Transportation Services    [address] [address]    Phone: [phone]    Website: [email or web address]    Request Status: Pending - No Request Sent    Services: [place] [employer]    Resource for: Transportation Needs    Languages: English    Cost: Free    Hours of Operation     Sunday --     Monday  9:30 AM -  3:30 PM     Tuesday  9:30 AM -  3:30 PM     Wednesday  9:30 AM -  3:30 PM     Thursday  9:30 AM -  3:30 PM     Friday  9:30 AM -  3:30 PM     Saturday --   [employer] - Volunteer Transportation Programs    [address] [place] [address]    Phone: [phone]    Website: [email or web address]    Request Status: Pending - No Request Sent    Services: [employer], Transportation    Resource for: Transportation Needs    Languages: English    Cost: Free    Hours of Operation     Sunday --     Monday  8:00 AM -  5:00 PM     Tuesday  8:00 AM -  5:00 PM     Wednesday  8:00 AM -  5:00 PM     Thursday  8:00 AM -  5:00 PM     Friday  8:00 AM -  5:00 PM     Saturday --         Search for additional community resources at [email or web address]      Referral sent to [facility] - confirmed they can accept and have been reserved. F2F entered/sent. HH instructions added to pt's AVS.    MDO entered and cosigned by Dr. Lyle for Day Rehab. SW requested PT/OT place notes as they will be needed before SW can send referral to [facility] for Day Rehab. SW to fax referral once those notes are available.    [facility] Day Rehab information added to pt's AVS as well.    UPDATE: Received notice that PT note now available.     BILL faxed referral for Day Rehab to [facility] at fax #: [phone].  BILL also spoke to [facility] via phone #: [phone] and informed her of incoming new referral.    PLAN: Home w/ part time CG and [facility] - pending  med clear      SW/CM to remain available for support and/or discharge planning.       MS MellyW, LSW [id number]

## 2025-03-07 NOTE — DISCHARGE INSTRUCTIONS
Home Health: Sometimes managing your health at home requires assistance.  The [facility] team has recognized your preference to use [facility].  They can be reached at [phone].  The fax number for your reference is [phone].  A representative from the home health agency will contact you or your family to schedule your first visit.      [facility]  [address] [place] [address] [place] [address]  Phone: [phone]  Fax: [phone]    Day Rehab: A referral for Day Rehab at [facility] has been sent on your behalf. They will verify your insurance and contact you when an appointment is available to be scheduled.   [facility]  [id number], [place] [address]  Phone: [phone]  Fax: [phone]

## 2025-03-07 NOTE — SPIRITUAL CARE NOTE
Spiritual Care Visit Note    Patient Name: Rhys Bird Date of Spiritual Care Visit: 25   : 1960 Primary Dx: Acute CVA (cerebrovascular accident) (HCC)       Referred By:      Spiritual Care Taxonomy:    Intended Effects: Aligning care plan with patient's values    Methods: Collaborate with care team member, Offer emotional support    Interventions: Acknowledge current situation, Active listening, Ask guided questions, Assist patient with documenting choices, Assist patient with documenting values, Assist someone with Advance Directives, Assist with determining decision maker, Identify supportive relationship(s), Facilitate advance care planning, Facilitate decision making    Visit Type/Summary:     - PoA: New PoAH Created: Visited patient in response to PoA for Healthcare consult/request. Created a new PoAH for Healthcare document that, per the patient, accurately reflects their wishes. Gave the patient the original PoAH document along with copies. Confirmed that the PoAH primary agent name and contact information have been entered into the Epic, Advance Directives section. A copy of the new PoAH document has been placed on the patient's paper chart.  remains available for follow up.    Spiritual Care support can be requested via an Epic consult. For urgent/immediate needs, please contact the On Call  at: [place]: ext [phone]    Chaplain Resident, Mare Gonzalez, PhD

## 2025-03-07 NOTE — ED QUICK NOTES
Orders for admission, patient is aware of plan and ready to go upstairs. Any questions, please call ED RN Jayden at extension [phone].     Patient Covid vaccination status: Unvaccinated     COVID Test Ordered in ED: None    COVID Suspicion at Admission: N/A    Running Infusions:  None    Mental Status/LOC at time of transport: A+Ox4    Other pertinent information:   CIWA score: N/A   NIH score:  0

## 2025-03-07 NOTE — ED INITIAL ASSESSMENT (HPI)
Patient arrives to Er for evaluation of weakness. Per patient he has felt general weakness. Pt has hx of neuropathy.

## 2025-03-07 NOTE — PLAN OF CARE
Patient sat up in chair this morning. EEG completed. Went for MRI this afternoon. Continuing neuro checks as ordered. Wife at bedside throughout the day. Fall precautions in place. Call light in reach.  Problem: Patient Centered Care  Goal: Patient preferences are identified and integrated in the patient's plan of care  Description: Interventions:  - What would you like us to know as we care for you? From home with wife   - Provide timely, complete, and accurate information to patient/family  - Incorporate patient and family knowledge, values, beliefs, and cultural backgrounds into the planning and delivery of care  - Encourage patient/family to participate in care and decision-making at the level they choose  - Honor patient and family perspectives and choices  Outcome: Progressing     Problem: Patient/Family Goals  Goal: Patient/Family Long Term Goal  Description: Patient's Long Term Goal: to go home    Interventions:  - follow physician orders   - See additional Care Plan goals for specific interventions  Outcome: Progressing  Goal: Patient/Family Short Term Goal  Description: Patient's Short Term Goal: determine cause of neuro symptoms    Interventions:   - neuro checks as ordered  -imaging as ordered  -neuro consult   - See additional Care Plan goals for specific interventions  Outcome: Progressing     Problem: CARDIOVASCULAR - ADULT  Goal: Maintains optimal cardiac output and hemodynamic stability  Description: INTERVENTIONS:  - Monitor vital signs, rhythm, and trends  - Monitor for bleeding, hypotension and signs of decreased cardiac output  - Evaluate effectiveness of vasoactive medications to optimize hemodynamic stability  - Monitor arterial and/or venous puncture sites for bleeding and/or hematoma  - Assess quality of pulses, skin color and temperature  - Assess for signs of decreased coronary artery perfusion - ex. Angina  - Evaluate fluid balance, assess for edema, trend weights  Outcome:  Progressing  Goal: Absence of cardiac arrhythmias or at baseline  Description: INTERVENTIONS:  - Continuous cardiac monitoring, monitor vital signs, obtain 12 lead EKG if indicated  - Evaluate effectiveness of antiarrhythmic and heart rate control medications as ordered  - Initiate emergency measures for life threatening arrhythmias  - Monitor electrolytes and administer replacement therapy as ordered  Outcome: Progressing     Problem: METABOLIC/FLUID AND ELECTROLYTES - ADULT  Goal: Glucose maintained within prescribed range  Description: INTERVENTIONS:  - Monitor Blood Glucose as ordered  - Assess for signs and symptoms of hyperglycemia and hypoglycemia  - Administer ordered medications to maintain glucose within target range  - Assess barriers to adequate nutritional intake and initiate nutrition consult as needed  - Instruct patient on self management of diabetes  Outcome: Progressing  Goal: Electrolytes maintained within normal limits  Description: INTERVENTIONS:  - Monitor labs and rhythm and assess patient for signs and symptoms of electrolyte imbalances  - Administer electrolyte replacement as ordered  - Monitor response to electrolyte replacements, including rhythm and repeat lab results as appropriate  - Fluid restriction as ordered  - Instruct patient on fluid and nutrition restrictions as appropriate  Outcome: Progressing     Problem: NEUROLOGICAL - ADULT  Goal: Achieves stable or improved neurological status  Description: INTERVENTIONS  - Assess for and report changes in neurological status  - Initiate measures to prevent increased intracranial pressure  - Maintain blood pressure and fluid volume within ordered parameters to optimize cerebral perfusion and minimize risk of hemorrhage  - Monitor temperature, glucose, and sodium. Initiate appropriate interventions as ordered  Outcome: Progressing  Goal: Absence of seizures  Description: INTERVENTIONS  - Monitor for seizure activity  - Administer  anti-seizure medications as ordered  - Monitor neurological status  Outcome: Progressing     Problem: Impaired Swallowing  Goal: Minimize aspiration risk  Description: Interventions:  - Patient should be alert and upright for all feedings (90 degrees preferred)  - Offer food and liquids at a slow rate  - No straws  - Encourage small bites of food and small sips of liquid  - Offer pills one at a time, crush or deliver with applesauce as needed  - Discontinue feeding and notify MD (or speech pathologist) if coughing or persistent throat clearing or wet/gurgly vocal quality is noted  Outcome: Progressing

## 2025-03-07 NOTE — PROGRESS NOTES
[facility]  Progress Note     Rhys Bird  : 1960    Status: Inpatient  Day #: 1    Attending: Kevin Lyle MD  PCP: HE MONTANEZ     Assessment and Plan:    Rhys Bird is a 64-year-old male with a history of alcoholic cirrhosis, who present with acute right-sided facial droop and right arm weakness.      # Acute ischemic stroke  - LKN around 25 at 0730, with about half an hour of right facial droop and right arm weakness  - CT head showed a questionable acute or subacute left caudate nucleus lacunar infarct.  - NIHSS in ED  - MRI brain  - Echo w/ bubble study  - A1c, LDL ant TSH  - PT, OT, SLP  - Tele  - continue home statin, add aspirin  - Neurology consulted     # Alcoholic cirrhosis  - Last alcohol use 2022.  - with repeated large volume paracentesis  - continue home meds     # Hypotension  - off metoprolol, on midodrine     # DM2  - SSI      # Hypomagnesemia  - mag 1.5, replenished        DVT Mechanical Prophylaxis:   SCDs, Early ambuation  DVT Pharmacologic Prophylaxis   Medication    heparin (Porcine) 5000 UNIT/ML injection 5,000 Units         DVT Pharmacologic prophylaxis: Aspirin 81 mg     Subjective:      Initial Chief Complaint:  right facial droop and right arm weakness    R arm weakness improved.  Facial droop resolved.  His wife did notice that he leans more to his right.    10 point ROS completed and was negative, except for pertinent positive and negatives stated in subjective.      Objective:      Temp:  [97.6 °F (36.4 °C)-98.1 °F (36.7 °C)] 97.6 °F (36.4 °C)  Pulse:  [] 102  Resp:  [15-34] 18  BP: ()/(60-79) 111/73  SpO2:  [94 %-98 %] 96 %  General:  Alert, no distress  HEENT:  Normocephalic, atraumatic  Cardiac:  Regular rate, regular rhythm  Pulmonary:  Clear to auscultation bilaterally, respirations unlabored  Gastrointestinal:  Soft, non-tender, normal bowel sounds  Musculoskeletal:  No joint swelling  Extremities:  No edema, no cyanosis, no  clubbing  Neurologic:  nonfocal  Psychiatric:  Normal affect, calm and appropriate    Intake/Output Summary (Last 24 hours) at 3/7/2025 1200  Last data filed at 3/7/2025 0600  Gross per 24 hour   Intake 648.75 ml   Output 350 ml   Net 298.75 ml         Recent Labs   Lab 03/01/25 0807 03/06/25 1928   WBC 11.9* 11.3*   HGB 13.8 13.2   HCT 43.6 41.0   .0 200.0   RBC 5.24 5.00   MCV 83.2 82.0   MCH 26.3 26.4   MCHC 31.7 32.2   RDW 14.6 14.8   NEPRELIM 7.41 7.30     Recent Labs   Lab 03/01/25  0807 03/01/25  0808 03/06/25 1928 03/06/25 2121 03/07/25  0700   BUN 17  --  12  --   --    CREATSERUM 1.10  --  1.00  --   --    CA 9.4  --  8.9  --   --    ALB 4.3  --  4.1  --   --      --  139  --   --    K 4.8  --  4.4  --   --      --  104  --   --    CO2 30.0  --  28.0  --   --    *  --  108*  --   --    MG  --  1.7 1.5*  --  1.7   PHOS  --  4.1  --   --   --    BILT 0.6  --  0.5  --   --    AST 17  --  19  --   --    ALT 10  --  10  --   --    ALKPHO 133*  --  137*  --   --    TP 7.4  --  7.1  --   --    GGT  --  27  --   --   --    PTT  --   --   --  35.2  --    INR 0.98  --   --  1.00  --    TSH  --   --   --   --  2.396       CTA BRAIN + CTA CAROTIDS (CPT=70496/87042)    Result Date: 3/7/2025  CONCLUSION:   No evidence of large vessel intracranial arterial occlusion or proximal flow limiting stenosis.  No evidence of occlusion, hemodynamically significant stenosis or evidence to suggest dissection of the bilateral cervical carotid and vertebral arteries.    Dictated by (CST): Daryl Barton MD on 3/07/2025 at 7:22 AM     Finalized by (CST): Daryl Barton MD on 3/07/2025 at 7:28 AM          CT BRAIN OR HEAD (CPT=70450)    Result Date: 3/6/2025  CONCLUSION:  1. A questionable acute or subacute left caudate nucleus lacunar infarct. 2. Chronic bilateral basal ganglionic infarcts related to small vessel disease. 3. Chronic left paramedian pontine infarct related to small vessel disease. 4.  Stable chronic changes of small vessel disease in cerebral white matter. 5. Large vessel intracranial atherosclerosis. 6. Cerebral and cerebellar atrophy.    Dictated by (CST): Andrea Camejo MD on 3/06/2025 at 8:29 PM     Finalized by (CST): Andrae Camejo MD on 3/06/2025 at 8:34 PM         EKG    Result Date: 3/7/2025  Normal sinus rhythm Normal ECG When compared with ECG of 05-JUN-2023 16:56, Sinus rhythm has replaced Atrial fibrillation Vent. rate has decreased BY  59 BPM Nonspecific T wave abnormality no longer evident in Lateral leads Confirmed by HE KOENIG ([id number]) on 3/7/2025 10:23:42 AM   Medications:   insulin aspart  1-7 Units Subcutaneous TID CC and HS    midodrine  5 mg Oral TID    heparin  5,000 Units Subcutaneous Q8H NOHELIA    atorvastatin  40 mg Oral Nightly    escitalopram  10 mg Oral Daily    gabapentin  300 mg Oral BID    lactulose  10 g Oral Daily    magnesium oxide  400 mg Oral BID    pantoprazole  40 mg Oral Before breakfast    rifAXIMin  550 mg Oral BID    thiamine  100 mg Oral Daily    folic acid  1 mg Oral Daily    aspirin  81 mg Oral Daily      PRN Meds:   acetaminophen **OR** acetaminophen    ondansetron    prochlorperazine    glucose **OR** glucose **OR** glucose-vitamin C **OR** dextrose **OR** glucose **OR** glucose **OR** glucose-vitamin C    Supplementary Documentation:   DVT Mechanical Prophylaxis:   SCDs, Early ambuation  DVT Pharmacologic Prophylaxis   Medication    heparin (Porcine) 5000 UNIT/ML injection 5,000 Units         DVT Pharmacologic prophylaxis: Aspirin 81 mg      Code Status: Full Code  Orellana: External urinary catheter in place  Orellana Duration (in days):   Central line:    YODIT: 3/9/2025        **Certification      PHYSICIAN Certification of Need for Inpatient Hospitalization - Initial Certification    Patient will require inpatient services that will reasonably be expected to span two midnight's based on the clinical documentation in H+P.   Based on patients current  state of illness, I anticipate that, after discharge, patient will require TBD.            MDM High. Time spent on chart/note review, review of labs/imaging, discussion with patient, physical exam, discussion with staff, consultants, coordinating care, writing progress note, discussion of plan of care.      Kevin Lyle MD

## 2025-03-07 NOTE — DISCHARGE SUMMARY
[facility]  Discharge Summary     Rhys Bird  : 1960    Status: Inpatient  Day #: 2    Attending: Kevin Lyle MD  PCP: HE MONTANEZ     Date of Admission:  3/6/2025  Date of Discharge:  3/8/2025     Hospital Discharge Diagnoses:     Acute ischemic stroke  Alcoholic cirrhosis  Hypotension  Diabetes mellitus 2  Hypomagnesemia  Prediabetes      History of Present Illness:     Copied from Admission H&P:    Rhys Bird is a(n) 64 year old male with a history of alcoholic cirrhosis, who present with acute right-sided facial droop and right arm weakness. Patient lives at home with wife. Patient has severe alcoholic cirrhosis, managed by [facility], with last alcohol use in 2022. Patient was noticed to have right facial droop between 9478-1373, along with some right arm weakness. There was no confusion, fall, constipation, cough or chest pain. In ED, patient has normal vitals, no hypertension or Afib. Blood work unremarkable. Right facial droop and right arm weakness have resolved. NIHSS 0. CT head showed a questionable acute or subacute left caudate nucleus lacunar infarct. Aspirin 324 mg given. Neurology consulted.       Hospital Course:     Rhys Bird is a 64-year-old male with a history of alcoholic cirrhosis, who present with acute right-sided facial droop and right arm weakness.      # Acute ischemic stroke  - LKN around 25 at 0730, with about half an hour of right facial droop and right arm weakness  - CT head showed a questionable acute or subacute left caudate nucleus lacunar infarct.  - MRI brain - reviewed. C/w acute stroke  - Echo result reviewed  - A1c 5.9 - prediabetic  - TSH normal  - LDL 42  - PT, OT, SLP  - continue home statin, add aspirin  - Neurology consulted     # Alcoholic cirrhosis  - Last alcohol use 2022.  - with repeated large volume paracentesis  - continue home meds     # Hypotension  - off metoprolol, on midodrine     # DM2  - SSI      #  Hypomagnesemia  - mag 1.5, replenished     Consultants         Provider   Role Specialty     Carlito Lainez DO      Consulting Physician NEUROLOGY             Physical Exam:   Blood pressure 143/79, pulse 77, temperature 97.4 °F (36.3 °C), temperature source Oral, resp. rate 17, height 6' (1.829 m), weight 239 lb 6.4 oz (108.6 kg), SpO2 95%.  General:  Alert, no distress  HEENT:  Normocephalic, atraumatic  Cardiac:  Regular rate, regular rhythm  Pulmonary:  Clear to auscultation bilaterally, respirations unlabored  Gastrointestinal:  Soft, non-tender, normal bowel sounds  Musculoskeletal:  No joint swelling  Extremities:  No edema, no cyanosis, no clubbing  Neurologic:  nonfocal  Psychiatric:  Normal affect, calm and appropriate         Discharge Medications        START taking these medications        Instructions Prescription details   aspirin 81 MG Tbec      Take 1 tablet (81 mg total) by mouth daily.   Quantity: 30 tablet  Refills: 0            CONTINUE taking these medications        Instructions Prescription details   atorvastatin 40 MG Tabs  Commonly known as: Lipitor      Take 1 tablet (40 mg total) by mouth nightly.   Refills: 0     citalopram 10 MG Tabs  Commonly known as: CeleXA      Take 1 tablet (10 mg total) by mouth daily.   Refills: 0     ferrous sulfate 325 (65 FE) MG Tbec      Take 1 tablet (325 mg total) by mouth daily with breakfast.   Refills: 0     folic acid 1 MG Tabs  Commonly known as: Folvite      Take 1 tablet (1 mg total) by mouth daily.   Quantity: 30 tablet  Refills: 0     furosemide 40 MG Tabs  Commonly known as: Lasix      Take 0.5 tablets (20 mg total) by mouth daily.   Quantity: 30 tablet  Refills: 1     gabapentin 300 MG Caps  Commonly known as: Neurontin      Take 1 capsule (300 mg total) by mouth in the morning and 1 capsule (300 mg total) before bedtime.   Refills: 0     lactulose 10 GM/15ML Soln  Commonly known as: CHRONULAC      Take 15 mL (10 g total) by mouth daily.    Refills: 0     magnesium oxide 400 MG Tabs  Commonly known as: Mag-Ox      Take 1 tablet (400 mg total) by mouth 2 (two) times daily.   Quantity: 60 tablet  Refills: 0     metFORMIN 500 MG Tabs  Commonly known as: Glucophage       Refills: 0     midodrine 10 MG Tabs  Commonly known as: ProAmatine      Take 5 mg by mouth in the morning and 5 mg at noon and 5 mg in the evening.   Refills: 0     pantoprazole 40 MG Tbec  Commonly known as: Protonix      Take 1 tablet (40 mg total) by mouth daily.   Refills: 0     rifAXIMin 200 MG Tabs  Commonly known as: Xifaxan      Take 550 mg by mouth in the morning and 550 mg before bedtime.   Refills: 0     semaglutide 2 MG/3ML Sopn  Commonly known as: Ozempic      Inject 0.25 mg into the skin once a week. On Sundays   Refills: 0     sucralfate 1 GM/10ML Susp  Commonly known as: Carafate      Take 10 mL (1 g total) by mouth 3 (three) times daily before meals.   Refills: 0     thiamine 100 MG Tabs  Commonly known as: Vitamin B1      Take 1 tablet (100 mg total) by mouth daily.   Quantity: 30 tablet  Refills: 0            STOP taking these medications      spironolactone 25 MG Tabs  Commonly known as: Aldactone                  Where to Get Your Medications        These medications were sent to [id number] - [place], [place] - [facility] [address]  AT [facility], [phone], [phone]  [address], [place] [address]      Phone: [phone]   aspirin 81 MG Tbec           Hospital Discharge Diagnoses:  acute ischemic stroke    Lace+ Score: 63  59-90 High Risk  29-58 Medium Risk  0-28   Low Risk.    TCM Follow-Up Recommendation:  LACE > 58: High Risk of readmission after discharge from the hospital.        I spent >30 minutes on this discharge. Discussed treatment and discharge plans.       Kevin Lyle MD

## 2025-03-08 ENCOUNTER — APPOINTMENT (OUTPATIENT)
Dept: CV DIAGNOSTICS | Facility: HOSPITAL | Age: 65
End: 2025-03-08
Attending: HOSPITALIST
Payer: COMMERCIAL

## 2025-03-08 VITALS
OXYGEN SATURATION: 95 % | DIASTOLIC BLOOD PRESSURE: 57 MMHG | RESPIRATION RATE: 17 BRPM | TEMPERATURE: 97 F | BODY MASS INDEX: 32.42 KG/M2 | WEIGHT: 239.38 LBS | SYSTOLIC BLOOD PRESSURE: 117 MMHG | HEART RATE: 88 BPM | HEIGHT: 72 IN

## 2025-03-08 LAB
GLUCOSE BLDC GLUCOMTR-MCNC: 155 MG/DL (ref 70–99)
GLUCOSE BLDC GLUCOMTR-MCNC: 263 MG/DL (ref 70–99)
MAGNESIUM SERPL-MCNC: 1.7 MG/DL (ref 1.6–2.6)

## 2025-03-08 PROCEDURE — 93306 TTE W/DOPPLER COMPLETE: CPT | Performed by: HOSPITALIST

## 2025-03-08 PROCEDURE — 99232 SBSQ HOSP IP/OBS MODERATE 35: CPT | Performed by: OTHER

## 2025-03-08 PROCEDURE — 99239 HOSP IP/OBS DSCHRG MGMT >30: CPT | Performed by: HOSPITALIST

## 2025-03-08 NOTE — PROGRESS NOTES
[facility]  [address] [place] [address], [address]  [facility] [address]  [phone]            Rhys Bird Patient Status:  Inpatient    1960 MRN [id number]   Location [facility] 3W/SW Attending Kevin Lyle MD   Hosp Day # 2 PCP HE MONTANEZ     Subjective:  Rhys Bird is a(n) 64 year old male.    Hospital course to date:     Rhys Bird is a 64 year old man w/ a pmhx sig. for Hx of alcoholism with multiple complications including alcohol associated neuropathy, ascites due to alcoholic cirrhosis, anxiety, depression, hepatic encephalopathy, hyperammonemia, GERD,?  Occlusion and stenosis of bilateral carotid arteries, MCI vs. Alcohol induced dementia, Atrial fibrillation, Prior stroke, HTN, DM, HLD, and CKD,  who is admitted for an infarct left caudate nucleus.     Last known well was 7:30 AM on 3/6/2025.  When his wife found him at 5:30 in the evening on that same day he had a transient right facial droop.     He had involuntary ?hemiballismus w his right arm in the ED;  now improved  He is slouching to the right per OT/PT.    MRI of the brain in 2025 showed  1. Acute infarct involving the left caudate head, anterior limb of the left internal capsule, and small components left lentiform nucleus.  There is minimal local mass effect.   2. Multiple small chronic infarcts involving the left anay sue and bilateral deep gray nuclei.      CTA in 2025 showed no significant occlusion or stenosis.    Current Facility-Administered Medications   Medication Dose Route Frequency    insulin aspart (NovoLOG) 100 Units/mL FlexPen 1-7 Units  1-7 Units Subcutaneous TID CC and HS    midodrine (ProAmatine) tab 5 mg  5 mg Oral TID    acetaminophen (Tylenol) tab 650 mg  650 mg Oral Q4H PRN    Or    acetaminophen (Tylenol) rectal suppository 650 mg  650 mg Rectal Q4H PRN    ondansetron (Zofran) 4 MG/2ML injection 4 mg  4 mg Intravenous Q6H PRN    prochlorperazine  (Compazine) 10 MG/2ML injection 5 mg  5 mg Intravenous Q8H PRN    heparin (Porcine) 5000 UNIT/ML injection 5,000 Units  5,000 Units Subcutaneous Q8H [facility]    atorvastatin (Lipitor) tab 40 mg  40 mg Oral Nightly    escitalopram (Lexapro) tab 10 mg  10 mg Oral Daily    gabapentin (Neurontin) cap 300 mg  300 mg Oral BID    lactulose (CHRONULAC) 10 GM/15ML solution 10 g  10 g Oral Daily    magnesium oxide (Mag-Ox) tab 400 mg  400 mg Oral BID    pantoprazole (Protonix) DR tab 40 mg  40 mg Oral Before breakfast    rifAXIMin (Xifaxan) tab 550 mg  550 mg Oral BID    thiamine (Vitamin B1) tab 100 mg  100 mg Oral Daily    folic acid (Folvite) tab 1 mg  1 mg Oral Daily    aspirin DR tab 81 mg  81 mg Oral Daily    glucose (Dex4) 15 GM/59ML oral liquid 15 g  15 g Oral Q15 Min PRN    Or    glucose (Glutose) 40% oral gel 15 g  15 g Oral Q15 Min PRN    Or    glucose-vitamin C (Dex-4) chewable tab 4 tablet  4 tablet Oral Q15 Min PRN    Or    dextrose 50% injection 50 mL  50 mL Intravenous Q15 Min PRN    Or    glucose (Dex4) 15 GM/59ML oral liquid 30 g  30 g Oral Q15 Min PRN    Or    glucose (Glutose) 40% oral gel 30 g  30 g Oral Q15 Min PRN    Or    glucose-vitamin C (Dex-4) chewable tab 8 tablet  8 tablet Oral Q15 Min PRN       Objective:  Blood pressure 143/79, pulse 77, temperature 97.4 °F (36.3 °C), temperature source Oral, resp. rate 17, height 72\", weight 239 lb 6.4 oz (108.6 kg), SpO2 95%.    Physical Exam:  Vitals:    03/08/25 0422 03/08/25 0500 03/08/25 0622 03/08/25 0800   BP: 129/76  134/89 143/79   Pulse: 79  87 77   Resp: 16   17   Temp: 97.7 °F (36.5 °C)   97.4 °F (36.3 °C)   TempSrc: Oral   Oral   SpO2: 95%   95%   Weight:  239 lb 6.4 oz (108.6 kg)     Height:               Lab Results   Component Value Date    WBC 11.3 (H) 03/06/2025    HGB 13.2 03/06/2025    HCT 41.0 03/06/2025    .0 03/06/2025    CREATSERUM 1.00 03/06/2025    BUN 12 03/06/2025     03/06/2025    K 4.4 03/06/2025     03/06/2025     CO2 28.0 03/06/2025     (H) 03/06/2025    CA 8.9 03/06/2025    ALB 4.1 03/06/2025    ALKPHO 137 (H) 03/06/2025    BILT 0.5 03/06/2025    TP 7.1 03/06/2025    AST 19 03/06/2025    ALT 10 03/06/2025    PTT 35.2 03/06/2025    INR 1.00 03/06/2025    TSH 2.396 03/07/2025    LIP 50 12/24/2023    GGT 27 03/01/2025    MG 1.7 03/08/2025    PHOS 4.1 03/01/2025    TROP <0.045 10/14/2020    B12 602 10/27/2023    ETOH <3 12/31/2022       MRI BRAIN (CPT=70551)    Result Date: 3/7/2025  CONCLUSION:  1. Acute infarct involving the left caudate head, anterior limb of the left internal capsule, and small components left lentiform nucleus.  There is minimal local mass effect. 2. Multiple small chronic infarcts involving the left anay sue and bilateral deep gray nuclei. 3. Mild chronic microvascular ischemic changes. 4. Moderate global parenchymal volume loss. 5. Lesser incidental findings described above.   Impression point 1 was communicated via secure epic chat to Dr. Lainez at 4:57 p.m. on 03/07/2025 by Khang Bond MD  Dictated by (CST): Khang Bond MD on 3/07/2025 at 4:52 PM     Finalized by (CST): Khang Bond MD on 3/07/2025 at 5:00 PM          CTA BRAIN + CTA CAROTIDS (CPT=70496/80889)    Result Date: 3/7/2025  CONCLUSION:   No evidence of large vessel intracranial arterial occlusion or proximal flow limiting stenosis.  No evidence of occlusion, hemodynamically significant stenosis or evidence to suggest dissection of the bilateral cervical carotid and vertebral arteries.    Dictated by (CST): Daryl Barton MD on 3/07/2025 at 7:22 AM     Finalized by (CST): Daryl Barton MD on 3/07/2025 at 7:28 AM          CT BRAIN OR HEAD (CPT=70450)    Result Date: 3/6/2025  CONCLUSION:  1. A questionable acute or subacute left caudate nucleus lacunar infarct. 2. Chronic bilateral basal ganglionic infarcts related to small vessel disease. 3. Chronic left paramedian pontine infarct related to small vessel disease. 4.  Stable chronic changes of small vessel disease in cerebral white matter. 5. Large vessel intracranial atherosclerosis. 6. Cerebral and cerebellar atrophy.    Dictated by (CST): Andrae Camejo MD on 3/06/2025 at 8:29 PM     Finalized by (CST): Andrae Camejo MD on 3/06/2025 at 8:34 PM         EKG    Result Date: 3/7/2025  Normal sinus rhythm Normal ECG When compared with ECG of 05-JUN-2023 16:56, Sinus rhythm has replaced Atrial fibrillation Vent. rate has decreased BY  59 BPM Nonspecific T wave abnormality no longer evident in Lateral leads Confirmed by HE KOENIG ([id number]) on 3/7/2025 10:23:42 AM       Assessment:  Patient Active Problem List   Diagnosis    Syncope and collapse    Hypokalemia    Hyponatremia    Hypomagnesemia    Hyperglycemia    Visual disturbance    Acute cystitis without hematuria    Stroke-like symptoms    Acute metabolic encephalopathy    Alcohol abuse    Episodic mood disorder    Delirium superimposed on dementia    Ascites due to alcoholic cirrhosis (HCC)    Atrial fibrillation and flutter (HCC)    Hypoxia    Anemia    Leukocytosis    Acute kidney injury    Abdominal distention    Alcoholic cirrhosis, unspecified whether ascites present (HCC)    Acute kidney injury superimposed on CKD    Acute cholecystitis    Constipation, unspecified constipation type    Acute CVA (cerebrovascular accident) (HCC)    Hemiballism       Stroke in the the head of the caudate can be due to small vessel disease either involving the recurrent artery of tumor or lenticulostriate vessels.   Likely not on anticoagulant with his prior hemorrhagic transformation of ischemic stroke.    Caudate strokes can cause movement disorders.  Suspect his hemiballismus in his right arm is related to his caudate infarct.  Will improve with time.  Given his history we will also order routine EEG for abnormal movements.  Will start on antiplatelet.  He should be discharged on aspirin 81 mg at a minimum.  Once his MRIs been  completed he is stable for discharge.  As an outpatient also recommend anticoagulation for A-fib.  Deferred to be discussed with outpatient neurologist and cardiologist.  Advised to wait for the next 3 months for the colonoscopy and possibly stay on aspirin during the procedure anyway.  But he will discuss it with his outpatient neurologist as well.     Stroke in the head of the caudate.  Differential Diagnosis:  Small vessel disease           Plan  Reperfusion therapy eligibility: patient is not eligible because: out of the  time window  not a candidate for thrombectomy because no large vessel occlusion  Agent and time of first antithrombotic administration (or contraindication):   Aspirin 81 mg daily or rectal aspirin 300 mg daily if still n.p.o.  BP goal:   Normotension at this point  Additional brain and vascular imaging ordered:   MRI brain WO confirmed the left caudate stroke,  CTA head/neck did not show significant stenosis or occlusions  Cardiac imaging: Telemetry is ongoing and TTE is still pending  Additional labs ordered:   Labs: Fasting lipid panel, LDL 42, and HgbA1C was 7.3  Fluids/nutrition:   ivfstroke : AVOID Hypotonic fluids in patients with acute ischemic stroke or cerebral edema.  Hypotonic fluids can worsen cerebral edema.  This includes D5 W, half-normal saline, and lactated Ringer's.  If patients need glucose then please use normal saline.  DVT prophylaxis:   SCD's while in bed  Therapy/rehab services ordered (speech/PT/OT/rehab consult):   Physical therapy, Occupational Therapy, speech therapy evaluations ordered.   maintain oxygen saturation >94%. No supplemental oxygen  in nonhypoxic patients with acute ischemic stroke (AIS).  Tx hyperthermia (temperature >38°C) and identify source  Neuro checks Q4.  Telemetry.NIH stroke scale per protocol.    Potential discharge today once echocardiogram is done.  Follow-up with established neurologist.    Galen Frias MD  3/8/2025  10:35 AM

## 2025-03-08 NOTE — PLAN OF CARE
Echo done and resulted. Cleared for discharge. Wife here to transfer patient home.  Dc instructions given and reviewed with patient.   Problem: Patient Centered Care  Goal: Patient preferences are identified and integrated in the patient's plan of care  Description: Interventions:  - What would you like us to know as we care for you? From home with wife   - Provide timely, complete, and accurate information to patient/family  - Incorporate patient and family knowledge, values, beliefs, and cultural backgrounds into the planning and delivery of care  - Encourage patient/family to participate in care and decision-making at the level they choose  - Honor patient and family perspectives and choices  Outcome: Adequate for Discharge     Problem: Patient/Family Goals  Goal: Patient/Family Long Term Goal  Description: Patient's Long Term Goal: to go home    Interventions:  - follow physician orders   - See additional Care Plan goals for specific interventions  Outcome: Adequate for Discharge  Goal: Patient/Family Short Term Goal  Description: Patient's Short Term Goal: determine cause of neuro symptoms    Interventions:   - neuro checks as ordered  -imaging as ordered  -neuro consult   - See additional Care Plan goals for specific interventions  Outcome: Adequate for Discharge     Problem: CARDIOVASCULAR - ADULT  Goal: Maintains optimal cardiac output and hemodynamic stability  Description: INTERVENTIONS:  - Monitor vital signs, rhythm, and trends  - Monitor for bleeding, hypotension and signs of decreased cardiac output  - Evaluate effectiveness of vasoactive medications to optimize hemodynamic stability  - Monitor arterial and/or venous puncture sites for bleeding and/or hematoma  - Assess quality of pulses, skin color and temperature  - Assess for signs of decreased coronary artery perfusion - ex. Angina  - Evaluate fluid balance, assess for edema, trend weights  Outcome: Adequate for Discharge  Goal: Absence of cardiac  arrhythmias or at baseline  Description: INTERVENTIONS:  - Continuous cardiac monitoring, monitor vital signs, obtain 12 lead EKG if indicated  - Evaluate effectiveness of antiarrhythmic and heart rate control medications as ordered  - Initiate emergency measures for life threatening arrhythmias  - Monitor electrolytes and administer replacement therapy as ordered  Outcome: Adequate for Discharge     Problem: METABOLIC/FLUID AND ELECTROLYTES - ADULT  Goal: Glucose maintained within prescribed range  Description: INTERVENTIONS:  - Monitor Blood Glucose as ordered  - Assess for signs and symptoms of hyperglycemia and hypoglycemia  - Administer ordered medications to maintain glucose within target range  - Assess barriers to adequate nutritional intake and initiate nutrition consult as needed  - Instruct patient on self management of diabetes  Outcome: Adequate for Discharge  Goal: Electrolytes maintained within normal limits  Description: INTERVENTIONS:  - Monitor labs and rhythm and assess patient for signs and symptoms of electrolyte imbalances  - Administer electrolyte replacement as ordered  - Monitor response to electrolyte replacements, including rhythm and repeat lab results as appropriate  - Fluid restriction as ordered  - Instruct patient on fluid and nutrition restrictions as appropriate  Outcome: Adequate for Discharge     Problem: NEUROLOGICAL - ADULT  Goal: Achieves stable or improved neurological status  Description: INTERVENTIONS  - Assess for and report changes in neurological status  - Initiate measures to prevent increased intracranial pressure  - Maintain blood pressure and fluid volume within ordered parameters to optimize cerebral perfusion and minimize risk of hemorrhage  - Monitor temperature, glucose, and sodium. Initiate appropriate interventions as ordered  Outcome: Adequate for Discharge  Goal: Absence of seizures  Description: INTERVENTIONS  - Monitor for seizure activity  - Administer  anti-seizure medications as ordered  - Monitor neurological status  Outcome: Adequate for Discharge     Problem: Impaired Swallowing  Goal: Minimize aspiration risk  Description: Interventions:  - Patient should be alert and upright for all feedings (90 degrees preferred)  - Offer food and liquids at a slow rate  - No straws  - Encourage small bites of food and small sips of liquid  - Offer pills one at a time, crush or deliver with applesauce as needed  - Discontinue feeding and notify MD (or speech pathologist) if coughing or persistent throat clearing or wet/gurgly vocal quality is noted  Outcome: Adequate for Discharge

## 2025-03-08 NOTE — PLAN OF CARE
Problem: CARDIOVASCULAR - ADULT  Goal: Maintains optimal cardiac output and hemodynamic stability  Description: INTERVENTIONS:  - Monitor vital signs, rhythm, and trends  - Monitor for bleeding, hypotension and signs of decreased cardiac output  - Evaluate effectiveness of vasoactive medications to optimize hemodynamic stability  - Monitor arterial and/or venous puncture sites for bleeding and/or hematoma  - Assess quality of pulses, skin color and temperature  - Assess for signs of decreased coronary artery perfusion - ex. Angina  - Evaluate fluid balance, assess for edema, trend weights  Outcome: Progressing  Goal: Absence of cardiac arrhythmias or at baseline  Description: INTERVENTIONS:  - Continuous cardiac monitoring, monitor vital signs, obtain 12 lead EKG if indicated  - Evaluate effectiveness of antiarrhythmic and heart rate control medications as ordered  - Initiate emergency measures for life threatening arrhythmias  - Monitor electrolytes and administer replacement therapy as ordered  Outcome: Progressing     Problem: METABOLIC/FLUID AND ELECTROLYTES - ADULT  Goal: Glucose maintained within prescribed range  Description: INTERVENTIONS:  - Monitor Blood Glucose as ordered  - Assess for signs and symptoms of hyperglycemia and hypoglycemia  - Administer ordered medications to maintain glucose within target range  - Assess barriers to adequate nutritional intake and initiate nutrition consult as needed  - Instruct patient on self management of diabetes  Outcome: Progressing  Goal: Electrolytes maintained within normal limits  Description: INTERVENTIONS:  - Monitor labs and rhythm and assess patient for signs and symptoms of electrolyte imbalances  - Administer electrolyte replacement as ordered  - Monitor response to electrolyte replacements, including rhythm and repeat lab results as appropriate  - Fluid restriction as ordered  - Instruct patient on fluid and nutrition restrictions as  appropriate  Outcome: Progressing     Problem: NEUROLOGICAL - ADULT  Goal: Achieves stable or improved neurological status  Description: INTERVENTIONS  - Assess for and report changes in neurological status  - Initiate measures to prevent increased intracranial pressure  - Maintain blood pressure and fluid volume within ordered parameters to optimize cerebral perfusion and minimize risk of hemorrhage  - Monitor temperature, glucose, and sodium. Initiate appropriate interventions as ordered  Outcome: Progressing  Goal: Absence of seizures  Description: INTERVENTIONS  - Monitor for seizure activity  - Administer anti-seizure medications as ordered  - Monitor neurological status  Outcome: Progressing     Problem: Impaired Swallowing  Goal: Minimize aspiration risk  Description: Interventions:  - Patient should be alert and upright for all feedings (90 degrees preferred)  - Offer food and liquids at a slow rate  - No straws  - Encourage small bites of food and small sips of liquid  - Offer pills one at a time, crush or deliver with applesauce as needed  - Discontinue feeding and notify MD (or speech pathologist) if coughing or persistent throat clearing or wet/gurgly vocal quality is noted  Outcome: Progressing

## 2025-03-08 NOTE — PROGRESS NOTES
[facility]  Progress Note     Rhys Bird  : 1960    Status: Inpatient  Day #: 2    Attending: Kevin Lyle MD  PCP: HE MONTANEZ     Assessment and Plan:    Rhys Bird is a 64-year-old male with a history of alcoholic cirrhosis, who present with acute right-sided facial droop and right arm weakness.      # Acute ischemic stroke  - LKN around 25 at 0730, with about half an hour of right facial droop and right arm weakness  - CT head showed a questionable acute or subacute left caudate nucleus lacunar infarct.  - NIHSS in ED  - MRI brain reviewed  - Echo pending  - A1c, LDL ant TSH  - PT, OT, SLP  - Tele  - continue home statin, add aspirin  - Neurology consulted     # Alcoholic cirrhosis  - Last alcohol use 2022.  - with repeated large volume paracentesis  - continue home meds     # Hypotension  - off metoprolol, on midodrine     # DM2  - SSI      # Hypomagnesemia  - mag 1.5, replenished    Dispo:  home if echo ok        DVT Mechanical Prophylaxis:   SCDs, Early ambuation  DVT Pharmacologic Prophylaxis   Medication    heparin (Porcine) 5000 UNIT/ML injection 5,000 Units         DVT Pharmacologic prophylaxis: Aspirin 81 mg     Subjective:      Initial Chief Complaint:  right facial droop and right arm weakness    R arm weakness improved.  Facial droop resolved.      10 point ROS completed and was negative, except for pertinent positive and negatives stated in subjective.      Objective:      Temp:  [97.4 °F (36.3 °C)-98.3 °F (36.8 °C)] 97.4 °F (36.3 °C)  Pulse:  [77-96] 77  Resp:  [16-20] 17  BP: (114-143)/(76-99) 143/79  SpO2:  [95 %-96 %] 95 %  General:  Alert, no distress  HEENT:  Normocephalic, atraumatic  Cardiac:  Regular rate, regular rhythm  Pulmonary:  Clear to auscultation bilaterally, respirations unlabored  Gastrointestinal:  Soft, non-tender, normal bowel sounds  Musculoskeletal:  No joint swelling  Extremities:  No edema, no cyanosis, no clubbing  Neurologic:   nonfocal  Psychiatric:  Normal affect, calm and appropriate    Intake/Output Summary (Last 24 hours) at 3/8/2025 1249  Last data filed at 3/8/2025 0900  Gross per 24 hour   Intake 600 ml   Output 950 ml   Net -350 ml         Recent Labs   Lab 03/06/25 1928   WBC 11.3*   HGB 13.2   HCT 41.0   .0   RBC 5.00   MCV 82.0   MCH 26.4   MCHC 32.2   RDW 14.8   NEPRELIM 7.30     Recent Labs   Lab 03/06/25 1928 03/06/25  2121 03/07/25  0700 03/08/25  0803   BUN 12  --   --   --    CREATSERUM 1.00  --   --   --    CA 8.9  --   --   --    ALB 4.1  --   --   --      --   --   --    K 4.4  --   --   --      --   --   --    CO2 28.0  --   --   --    *  --   --   --    MG 1.5*  --  1.7 1.7   BILT 0.5  --   --   --    AST 19  --   --   --    ALT 10  --   --   --    ALKPHO 137*  --   --   --    TP 7.1  --   --   --    PTT  --  35.2  --   --    INR  --  1.00  --   --    TSH  --   --  2.396  --        MRI BRAIN (CPT=70551)    Result Date: 3/7/2025  CONCLUSION:  1. Acute infarct involving the left caudate head, anterior limb of the left internal capsule, and small components left lentiform nucleus.  There is minimal local mass effect. 2. Multiple small chronic infarcts involving the left anay sue and bilateral deep gray nuclei. 3. Mild chronic microvascular ischemic changes. 4. Moderate global parenchymal volume loss. 5. Lesser incidental findings described above.   Impression point 1 was communicated via secure epic chat to Dr. Lainez at 4:57 p.m. on 03/07/2025 by Khang Bond MD  Dictated by (CST): Khang Bond MD on 3/07/2025 at 4:52 PM     Finalized by (CST): Khang Bond MD on 3/07/2025 at 5:00 PM          CTA BRAIN + CTA CAROTIDS (CPT=70496/42580)    Result Date: 3/7/2025  CONCLUSION:   No evidence of large vessel intracranial arterial occlusion or proximal flow limiting stenosis.  No evidence of occlusion, hemodynamically significant stenosis or evidence to suggest dissection of the bilateral  cervical carotid and vertebral arteries.    Dictated by (CST): Daryl Barton MD on 3/07/2025 at 7:22 AM     Finalized by (CST): Daryl Barton MD on 3/07/2025 at 7:28 AM          CT BRAIN OR HEAD (CPT=70450)    Result Date: 3/6/2025  CONCLUSION:  1. A questionable acute or subacute left caudate nucleus lacunar infarct. 2. Chronic bilateral basal ganglionic infarcts related to small vessel disease. 3. Chronic left paramedian pontine infarct related to small vessel disease. 4. Stable chronic changes of small vessel disease in cerebral white matter. 5. Large vessel intracranial atherosclerosis. 6. Cerebral and cerebellar atrophy.    Dictated by (CST): Andrae Camejo MD on 3/06/2025 at 8:29 PM     Finalized by (CST): Andrae Camejo MD on 3/06/2025 at 8:34 PM         EKG    Result Date: 3/7/2025  Normal sinus rhythm Normal ECG When compared with ECG of 05-JUN-2023 16:56, Sinus rhythm has replaced Atrial fibrillation Vent. rate has decreased BY  59 BPM Nonspecific T wave abnormality no longer evident in Lateral leads Confirmed by HE KOENIG ([id number]) on 3/7/2025 10:23:42 AM   Medications:   insulin aspart  1-7 Units Subcutaneous TID CC and HS    midodrine  5 mg Oral TID    heparin  5,000 Units Subcutaneous Q8H NOHELIA    atorvastatin  40 mg Oral Nightly    escitalopram  10 mg Oral Daily    gabapentin  300 mg Oral BID    lactulose  10 g Oral Daily    magnesium oxide  400 mg Oral BID    pantoprazole  40 mg Oral Before breakfast    rifAXIMin  550 mg Oral BID    thiamine  100 mg Oral Daily    folic acid  1 mg Oral Daily    aspirin  81 mg Oral Daily      PRN Meds:   acetaminophen **OR** acetaminophen    ondansetron    prochlorperazine    glucose **OR** glucose **OR** glucose-vitamin C **OR** dextrose **OR** glucose **OR** glucose **OR** glucose-vitamin C    Supplementary Documentation:   DVT Mechanical Prophylaxis:   SCDs, Early ambuation  DVT Pharmacologic Prophylaxis   Medication    heparin (Porcine) 5000 UNIT/ML  injection 5,000 Units         DVT Pharmacologic prophylaxis: Aspirin 81 mg      Code Status: Full Code  Orellana: External urinary catheter in place  Orellana Duration (in days):   Central line:    YODIT: 3/9/2025        **Certification      PHYSICIAN Certification of Need for Inpatient Hospitalization - Initial Certification    Patient will require inpatient services that will reasonably be expected to span two midnight's based on the clinical documentation in H+P.   Based on patients current state of illness, I anticipate that, after discharge, patient will require TBD.            [facility]. Time spent on chart/note review, review of labs/imaging, discussion with patient, physical exam, discussion with staff, consultants, coordinating care, writing progress note, discussion of plan of care.      Kevin Lyle MD

## 2025-03-10 ENCOUNTER — PATIENT OUTREACH (OUTPATIENT)
Dept: CASE MANAGEMENT | Age: 65
End: 2025-03-10

## 2025-03-10 NOTE — PROGRESS NOTES
Neuro/Stroke appointment request (discharged 03/08)    Dr Carlito Lainez  Neurology  [address] [facility] [address]  [place] [address]  [phone]  Follow up 4-6 weeks  Patient's wife, Temi advised they have a Neurologist and will be making an appointment with him    Dr Sanjeev Jj  [facility]  [address] [place] [address]  [phone]  Patient's wife, Temi declined sooner appointment; patient has existing appointment Mon 03/31 @5:30pm  Closing encounter

## 2025-03-12 ENCOUNTER — TELEPHONE (OUTPATIENT)
Facility: CLINIC | Age: 65
End: 2025-03-12

## 2025-03-12 NOTE — PAYOR COMM NOTE
RECONSIDERATION REQUEST    ADMISSION REVIEW     Payor: [facility]  Subscriber #:  [id number]  Authorization Number: [id number]    Admit date: 3/6/25  Admit time: 11:04 PM       REVIEW DOCUMENTATION:     ED Provider Notes        ED Provider Notes signed by Conrad Brown MD at 3/6/2025  9:45 PM       Author: Conrad Brown MD Service: Emergency Medicine Author Type: Physician    Filed: 3/6/2025  9:45 PM Date of Service: 3/6/2025  7:50 PM Status: Signed    : Conrad Brown MD (Physician)           Patient Seen in: [facility] Emergency Department      History     Chief Complaint   Patient presents with    Weakness     Stated Complaint: Weakness    Subjective:   HPI      64-year-old male presents for evaluation for weakness, facial droop, abnormal movements of his right upper extremity.  Wife reports that she left for work around 730 this morning.  When she returned home she noticed that he had a right-sided facial droop that lasted for about 30 minutes around 530.  She states that he seemed intermittently weak and fatigued but that now appears to be much better and have more energy.  Patient does report that he is having an unusual movement in his right upper extremity.  He has been compliant with his medications.  He denies any headache, fevers, chills, nausea, vomiting, abdominal pain.  He denies chest pain, shortness of breath.  Wife reports that they did have COVID about 3 weeks ago.    Objective:     Past Medical History:    Alcoholic cirrhosis (HCC)    Esophageal reflux    High blood pressure    High cholesterol    Neuropathy    Neuropathy             Physical Exam     ED Triage Vitals [03/06/25 1915]   /79   Pulse 86   Resp 16   Temp    Temp src    SpO2 95 %   O2 Device None (Room air)       Current Vitals:   Vital Signs  BP: 112/73  Pulse: 88  Resp: 15  MAP (mmHg): 89    Oxygen Therapy  SpO2: 96 %  O2 Device: None (Room air)        Physical Exam  Vitals and  nursing note reviewed.   Constitutional:       Appearance: Normal appearance.   HENT:      Head: Normocephalic and atraumatic.   Eyes:      General: Lids are normal.      Extraocular Movements: Extraocular movements intact.      Pupils: Pupils are equal, round, and reactive to light.   Cardiovascular:      Rate and Rhythm: Normal rate and regular rhythm.      Pulses: Normal pulses.      Heart sounds: Normal heart sounds.   Pulmonary:      Effort: Pulmonary effort is normal.      Breath sounds: Normal breath sounds.   Abdominal:      Tenderness: There is no abdominal tenderness. There is no guarding.   Musculoskeletal:         General: Normal range of motion.      Cervical back: Normal range of motion.   Skin:     General: Skin is warm and dry.   Neurological:      General: No focal deficit present.      Mental Status: He is alert.      Cranial Nerves: No cranial nerve deficit, dysarthria or facial asymmetry.      Sensory: Sensation is intact.      Motor: Motor function is intact.      Coordination: Coordination is intact.      Gait: Gait is intact.      Comments: Abnormal repetitive movements of the RUE             ED Course     Labs Reviewed   CBC WITH DIFFERENTIAL WITH PLATELET - Abnormal; Notable for the following components:       Result Value    WBC 11.3 (*)     Monocyte Absolute 1.09 (*)     All other components within normal limits   COMP METABOLIC PANEL (14) - Abnormal; Notable for the following components:    Glucose 108 (*)     Alkaline Phosphatase 137 (*)     All other components within normal limits   MAGNESIUM - Abnormal; Notable for the following components:    Magnesium 1.5 (*)     All other components within normal limits   AMMONIA, PLASMA - Normal   PROTHROMBIN TIME (PT)   PTT, ACTIVATED     EKG    Rate, intervals and axes as noted on EKG Report.  Rate: 84  Rhythm: Sinus Rhythm  Reading: no stemi, interpreted by myself           ED Course as of 03/06/25  2145  ------------------------------------------------------------  Time: 03/06 2123  Value: CT BRAIN OR HEAD (CPT=70450)  Comment: Per my independent interpretation, patient's CT Head demonstrates no intracranial hemorrhage.               [facility]          Admission disposition: 3/6/2025  9:22 PM           Medical Decision Making  Differential diagnosis includes but is not limited to CVA, hepatic encephalopathy, seizure, electrolyte disturbance, etc    NIH was 0 on arrival.  Last known well approx 730am.  CBC and chemistry were unremarkable.  Patient's mag was 1.5, IV magnesium was given.  CT concerning for subacute versus acute CVA.  Patient is outside of the therapeutic window for tenecteplase and, NIH is 0, he is not a candidate for thrombectomy.  Patient is given a dose of aspirin and admitted with neurology on consult.    Rhythm Strip: Rate 88 sinus rhythm as interpreted by myself. The cardiac monitor was ordered secondary to the patient's CVA/TIA.     Complicating factors: The patient  has a past medical history of Alcoholic cirrhosis (HCC), Esophageal reflux, High blood pressure, High cholesterol, Neuropathy, and Neuropathy. and  has no past surgical history on file. that contribute to the medical complexity of this ED evaluation.     Medical Record Review: I personally reviewed available prior medical records for any recent pertinent discharge summaries, testing, and procedures, and reviewed those reports.        Problems Addressed:  Acute CVA (cerebrovascular accident) (HCC): acute illness or injury with systemic symptoms  Hypomagnesemia: acute illness or injury    Amount and/or Complexity of Data Reviewed  Independent Historian: spouse     Details: As per HPI  Labs: ordered. Decision-making details documented in ED Course.  Radiology: ordered and independent interpretation performed. Decision-making details documented in ED Course.  ECG/medicine tests: ordered and independent interpretation performed.  Decision-making details documented in ED Course.  Discussion of management or test interpretation with external provider(s): Discussed with Dr. Covington who accepts admission.  Dr. Lainez on with neuro.     Risk  Decision regarding hospitalization.    Critical Care  Total time providing critical care: minutes (43 minutes including time spent examining and re-evaluating the patient, ordering and reviewing laboratory tests, documenting, reviewing previous records, obtaining information from the family, and speaking with consultants, admitting doctors, nurses and medics and excludes any time spent on procedures.  )        Disposition and Plan     Clinical Impression:  1. Acute CVA (cerebrovascular accident) (HCC)    2. Hypomagnesemia         Disposition:  Admit  3/6/2025  9:22 pm      Supplementary Documentation:         Hospital Problems       Present on Admission  Date Reviewed: 3/1/2024            ICD-10-CM Noted POA    * (Principal) Acute CVA (cerebrovascular accident) (HCC) I63.9 3/6/2025 Unknown                    Signed by Conrad Brown MD on 3/6/2025  9:45 PM         3/7 Neurology consult  Date of Consult:  3/7/2025  Reason for Admission/Consultation: \"Patient came in for weakness and right sided facial droop from home. Patient has history of alcoholic cirrhosis and stays in bed all day. Wife last saw him around 730 am and when she came home from work around 530pm noticed some right facial droop. This resolved in less than minutes she said but he was then making weird movements with his right arm. His NIH here is 0 but his head ct shows an acute vs subacute infarct in the left caudate nucleus. \"    Requested by: Kathi Covington MD  Name of Outside Hospital if Transferred: N/A  Time of patient arrival:  6:23 PM   on 3/6/25   Time of initial page: 9:15 PM on 03/07/25         Time of encounter: 11:18 AM   on 03/07/25      _________________________________________________________________________________________     Chief Complaint:       Chief Complaint   Patient presents with    Weakness         HPI:  Rhys Bird is a 64 year old man w/ a pmhx sig. for Hx of alcoholism with multiple complications including alcohol associated neuropathy, ascites due to alcoholic cirrhosis, anxiety, depression, hepatic encephalopathy, hyperammonemia, GERD,?  Occlusion and stenosis of bilateral carotid arteries, MCI vs. Alcohol induced dementia, Atrial fibrillation, Prior stroke, HTN, DM, HLD, and CKD,  who is admitted for an infarct left caudate nucleus.     Last known well was 7:30 AM on 3/6/2025.  When his wife found him at 5:30 in the evening on that same day he had a transient right facial droop.     He had involuntary ?hemiballismus w his right arm in the ED;  now improved  He is slouching to the right per OT/PT      He is known to the neurology service; he was last seen by my colleague Dr. Frias in 2022.     Follows w Dr. Perry at [facility] neurology.           Vascular Risk Factors:   Atrial fibrillation  Carotid artery stenosis  CKD  Diabetes mellitus  Dyslipidemia  Hypertension  Male sex at birth  Physical inactivity     Prior to admission, taking antithrombotic: No                Agent: ?  Unclear why he is not on antithrombotic.  May be because of his cirrhosis  Prior to admission, taking statin: Yes                Agent: Cholesterol Meds: atorvastatin - 40 MG; atorvastatin Tabs - 40 MG      Prior to admission, taking antihypertensive: No                Agent (s):   Blood Pressure and Cardiac Medications               midodrine 10 MG Oral Tab                Review and summation of prior records  Prior neurology note                \"Patient is a 62 year old male who was admitted to the hospital for Stroke-like symptoms:  Patient who is known to our service from the prior admission in April 2021.  At that time he presented also  some altered mental status but also was found to have possible lacunar stroke with hemorrhagic transformation.  Therefore his aspirin Plavix was held for a while at that point.  He does have outpatient neurologist.  At first there was some concern for possible Parkinson's, but there was no significant progression of parkinsonian symptoms.  Therefore it was most likely a seen that he had alcoholic dementia with neuropathy and metabolic abnormalities accounting for most of his problems.      He was brought to the hospital again in December 2022.  With an episode after going out for dinner being more tired than usual, glazed look over his eyes which sometimes happens and therefore he was brought to the hospital.  Ativan was given and there was a thought that he might have improved in terms of his cognition.  1 dose of Keppra also was given.  -The patient at home was refusing occasional medications especially his potassium.\"     ROS:  Pertinent positive and negatives per HPI.  All others were reviewed and negative.     Past Medical History       Past Medical History:    Alcohol dependence, in remission (HCC)    Alcoholic cirrhosis (HCC)    Alcoholic polyneuropathy (HCC)    Atherosclerotic heart disease of native coronary artery without angina pectoris    Benign prostatic hyperplasia without lower urinary tract symptoms    Choledocholithiasis    COVID-19    Difficulty in walking, not elsewhere classified    Esophageal reflux    Esophagitis, unspecified without bleeding    Essential (primary) hypertension    Gallstone pancreatitis (HCC)    Gastro-esophageal reflux disease without esophagitis    High blood pressure    High cholesterol    Insomnia, unspecified    Major depressive disorder, recurrent, unspecified    Moderate protein-calorie malnutrition (HCC)    Muscle weakness (generalized)    Neuropathy    Neuropathy    Occlusion and stenosis of bilateral carotid arteries    Orthostatic hypotension    Type 2 diabetes  mellitus without complications (HCC)            Outpatient Medications       Current Outpatient Medications   Medication Instructions    atorvastatin (LIPITOR) 40 mg, Nightly    citalopram (CELEXA) 10 mg, Daily    ferrous sulfate 325 mg, Daily with breakfast    folic acid (FOLVITE) 1 mg, Oral, Daily    furosemide (LASIX) 20 mg, Oral, Daily    gabapentin (NEURONTIN) 300 mg, 2 times daily    lactulose (CHRONULAC) 10 g, Daily    magnesium oxide (MAG-OX) 400 mg, Oral, 2 times daily    metFORMIN 500 MG Oral Tab      midodrine (PROAMATINE) 5 mg, Oral, 3 times daily    pantoprazole (PROTONIX) 40 mg, Daily    rifAXIMin (XIFAXAN) 550 mg, 2 times daily    semaglutide (OZEMPIC) 0.25 mg, Weekly    spironolactone (ALDACTONE) 25 mg, Oral, Daily    sucralfate (CARAFATE) 1 g, 3 times daily before meals    thiamine (VITAMIN B1) 100 mg, Oral, Daily         Inpatient Medications  Medications - Current   No current outpatient medications on file.           Scheduled Medications    insulin aspart  1-7 Units Subcutaneous TID CC and HS    midodrine  5 mg Oral TID    heparin  5,000 Units Subcutaneous Q8H NOHELIA    atorvastatin  40 mg Oral Nightly    escitalopram  10 mg Oral Daily    gabapentin  300 mg Oral BID    lactulose  10 g Oral Daily    magnesium oxide  400 mg Oral BID    pantoprazole  40 mg Oral Before breakfast    rifAXIMin  550 mg Oral BID    thiamine  100 mg Oral Daily    folic acid  1 mg Oral Daily    aspirin  81 mg Oral Daily              PRN Medications     acetaminophen **OR** acetaminophen    ondansetron    prochlorperazine    glucose **OR** glucose **OR** glucose-vitamin C **OR** dextrose **OR** glucose **OR** glucose **OR** glucose-vitamin C        Objective:  Last vitals and weight :      Vitals:     03/07/25 0400   BP: 102/64   Pulse: 84   Resp: 20   Temp: 98.1 °F (36.7 °C)         Exam:  - General: appears stated age and no distress  - CV: symmetric pulses           Carotids:   - Pulmonary: no signs of respiratory  distress. Normal excursion of the chest.      Neurologic Exam  - Mental Status: Alert and attentive. Oriented to  person, month of year, and year.  Speech is spontaneous, fluent, and prosodic. Comprehension and repetition intact. Phrase length and rate are normal. No paraphasic errors, neologisms, anomia, acalculia, apraxia, anosognosia, or R/L confusion. No neglect.   - Cranial Nerves: No gaze preference. Visual fields:normal  Pupils are 5mm briskly constricting to 4mm and equally round and reactive to light  in a well lit room. EOMI. No nystagmus. No ptosis. V1-V3 intact B/L to light touch.No pathological facial asymmetry. No flattening of the nasolabial fold. .  Hearing grossly intact.  Tongue midline. No atrophy or fasiculations of the tongue noted. Palate and uvula elevate symmetrically.  Shoulder shrug symmetric.  - Fundoscopic exam:normal w/o hemorrhages, exudates, or papilledema.No attenuation. No pallor.  - Motor:  normal tone, normal bulk. No interosseous wasting. No flattening of hypothenar eminences.  He does have rare involuntary movements of his right arm.                                                           Right                  Left     Motor Strength                           Deltoids                           5                         5  Triceps                            5                         5  Biceps                             5                         5  Wrist Extensors              55                  Hip Flexors                      5                         5                                                                       Pronator drift: No pronator drift           Index Rolling: No orbiting.           Finger Taps: Finger taps are symmetric in rate and amplitude.            Rapid movements: Rapid/fine movements are symmetric. As expected their dominant hand is slightly faster.           Leg Drift: None           Foot Taps: Foot taps are symmetric.                       Asterixis: No asterixis noted.           Tremor: None             Reflexes:     C5 C6 C7  L4 S1   R 2+ 2+   2+ 2+   L 2+ 2+   2+ 2+   Adductor Spread: No adductor spread noted.    Frontal release signs:Not assessed.    Ronald's sign:absent   Nonsustained clonus: Absent   Sustained clonus: Absent            - Sensory:   Light touch: normal  Temperature: normal  Vibration: normal  - Cerebellum: No truncal ataxia. No titubations. No dysmetria, no dysdiadochokinesis. No overshoot.   - Gait/station: Walks with a walker.  - Plantar response: flexor bilaterally     NIH Stroke Scale  Person Administering Scale: Carlito Lainez DO  1a  Level of consciousness: 0 = Alert keenly responsive    1b. LOC questions:  0 = Answers both questions correctly     1c. LOC commands: 0 = Performs both tasks correctly    2.  Best Gaze: 0 = Normal    3.  Visual: 0 = No visual loss     4. Facial Palsy: 0 = Normal symmetrical movement     5a.  Motor left arm: 0 = No drift; limb holds 90º(or 45º) for full 10 seconds   5b.  Motor right arm: 0 = No drift; limb holds 90º(or 45º) for full 10 seconds   6a. motor left le = No drift; leg holds 30º for full 5 seconds     6b  Motor right le = No drift; leg holds 30º for full 5 seconds     7. Limb Ataxia: 0 = Absent     8.  Sensory: 0 = Normal, no sensory loss     9. Best Language:  0 = No aphasia, normal      10. Dysarthria: 0 = Normal articulation   11. Extinction and Inattention: 0 = No abnormality      Total:   0      Modified Kilmichael Score: 3 - Moderate disability. Requires some help, but able to walk unassisted.                                Data reviewed     ECG findings by monitor or 12-lead:  Ecg:         Results for orders placed or performed during the hospital encounter of 25   EKG     Collection Time: 25  7:55 PM   Result Value Ref Range     Ventricular rate 84 BPM     Atrial rate 84 BPM     P-R Interval 174 ms     QRS Duration 84 ms     Q-T Interval 386 ms     QTC  Calculation (Bezet) 456 ms     P Axis 28 degrees     R Axis -22 degrees     T Axis 35 degrees         Test results/Imaging:         Lab Results   Component Value Date/Time     TRIG 136 04/15/2021 06:47 PM     HDL 41 04/15/2021 06:47 PM     LDL 88 04/15/2021 06:47 PM           Recent Labs   Lab 03/01/25  0807 03/06/25 1928   WBC 11.9* 11.3*   HGB 13.8 13.2   HCT 43.6 41.0   .0 200.0           Recent Labs   Lab 03/01/25  0807 03/06/25 1928    139   K 4.8 4.4    104   CO2 30.0 28.0   BUN 17 12   ALT 10 10   AST 17 19            Lab Results   Component Value Date     A1C 7.3 (H) 03/06/2025     A1C 5.9 (A) 03/01/2024     A1C 9.4 (H) 12/10/2023      Last A1c value was 7.3% done 3/6/2025.           CT BRAIN OR HEAD (CPT=70450)     Result Date: 3/6/2025  CONCLUSION:  1. A questionable acute or subacute left caudate nucleus lacunar infarct. 2. Chronic bilateral basal ganglionic infarcts related to small vessel disease. 3. Chronic left paramedian pontine infarct related to small vessel disease. 4. Stable chronic changes of small vessel disease in cerebral white matter. 5. Large vessel intracranial atherosclerosis. 6. Cerebral and cerebellar atrophy.    Dictated by (CST): Andrae Camejo MD on 3/06/2025 at 8:29 PM     Finalized by (CST): Andrae Camejo MD on 3/06/2025 at 8:34 PM            Performed an independent visualization of: CT brain WO   Imaging revealed: Agree with radiology read.        Assessment  Stroke in the the head of the caudate can be due to small vessel disease either involving the recurrent artery of tumor or lenticulostriate vessels.   Likely not on anticoagulant with his prior hemorrhagic transformation of ischemic stroke.    Caudate strokes can cause movement disorders.  Suspect his hemiballismus in his right arm is related to his caudate infarct.  Will improve with time.  Given his history we will also order routine EEG for abnormal movements.  Will start on antiplatelet.  He should  be discharged on aspirin 81 mg at a minimum.  Once his MRIs been completed he is stable for discharge.  As an outpatient also recommend anticoagulation for A-fib.  Deferred with outpatient neurologist and cardiologist.     Stroke in the head of the caudate.  Differential Diagnosis:  Small vessel disease           Plan  Reperfusion therapy eligibility: patient is not eligible because: out of the  time window  not a candidate for thrombectomy because no large vessel occlusion  Agent and time of first antithrombotic administration (or contraindication):   Aspirin 325 once or rectal aspirin 300 once. Starting tomorrow Aspirin 81 mg daily or rectal aspirin 300 mg daily if still n.p.o.  BP goal:   Permissive hypertension  Additional brain and vascular imaging ordered:   MRI brain WO, CT brain WO, and CTA head/neck    Cardiac imaging: Telemetry and TTE    Additional labs ordered:   Labs: Fasting lipid panel and HgbA1C  Dysphagia status:   DysphagiaNo acute facial droop; per RN swallow evaluation.  Fluids/nutrition:   ivfstroke : AVOID Hypotonic fluids in patients with acute ischemic stroke or cerebral edema.  Hypotonic fluids can worsen cerebral edema.  This includes D5 W, half-normal saline, and lactated Ringer's.  If patients need glucose then please use normal saline.  DVT prophylaxis:   SCD's while in bed  Therapy/rehab services ordered (speech/PT/OT/rehab consult):   Physical therapy, Occupational Therapy, speech therapy evaluations ordered.   maintain oxygen saturation >94%. No supplemental oxygen  in nonhypoxic patients with acute ischemic stroke (AIS).  Tx hyperthermia (temperature >38°C) and identify source  Evidence indicates that persistent in-hospital hyperglycemia during the first 24 hours after AIS is associated with worse outcomes than normoglycemia and thus, it is reasonable to treat hyperglycemia to achieve blood glucose levels in a range of 140 to 180 mg/dL and to closely monitor to prevent hypoglycemia  in patients with AIS.  Neuro checks Q4.  Telemetry.NIH stroke scale per protocol.  Other:                  This document is not intended to support charting by exception.  Sections left blank in a completed note should be presumed not to have been done.     Level of complexity was based on - interviewing, examining the patient, establishing a plan of care, as well as review of all neuroimaging and cardiovascular studies.     Disclaimer:   This record was dictated using Dragon software. There may be errors due to voice recognition problems that were not realized and corrected during the completion of the note.       Thank you.  Carlito Lainez DO   Staff Vascular & General Neurology       3/8  Date of Discharge:  3/8/2025      Hospital Discharge Diagnoses:      Acute ischemic stroke  Alcoholic cirrhosis  Hypotension  Diabetes mellitus 2  Hypomagnesemia  Prediabetes      History of Present Illness:      Copied from Admission H&P:     Rhys Bird is a(n) 64 year old male with a history of alcoholic cirrhosis, who present with acute right-sided facial droop and right arm weakness. Patient lives at home with wife. Patient has severe alcoholic cirrhosis, managed by [facility], with last alcohol use in 12/2022. Patient was noticed to have right facial droop between 0721-0911, along with some right arm weakness. There was no confusion, fall, constipation, cough or chest pain. In ED, patient has normal vitals, no hypertension or Afib. Blood work unremarkable. Right facial droop and right arm weakness have resolved. NIHSS 0. CT head showed a questionable acute or subacute left caudate nucleus lacunar infarct. Aspirin 324 mg given. Neurology consulted.       Hospital Course:      Rhys Bird is a 64-year-old male with a history of alcoholic cirrhosis, who present with acute right-sided facial droop and right arm weakness.      # Acute ischemic stroke  - LKN around 03/06/25 at 0730, with about half an hour of right  facial droop and right arm weakness  - CT head showed a questionable acute or subacute left caudate nucleus lacunar infarct.  - MRI brain - reviewed. C/w acute stroke  - Echo result reviewed  - A1c 5.9 - prediabetic  - TSH normal  - LDL 42  - PT, OT, SLP  - continue home statin, add aspirin  - Neurology consulted     # Alcoholic cirrhosis  - Last alcohol use 12/2022.  - with repeated large volume paracentesis  - continue home meds     # Hypotension  - off metoprolol, on midodrine     # DM2  - SSI      # Hypomagnesemia  - mag 1.5, replenished

## 2025-03-12 NOTE — TELEPHONE ENCOUNTER
Dr Song    Received a fax from [facility].    The patient was seen by Dr Cast on 3/10/2025.    Office visit notes can be viewed in Epic.    Thank you

## 2025-04-03 ENCOUNTER — HOSPITAL ENCOUNTER (OUTPATIENT)
Dept: ULTRASOUND IMAGING | Facility: HOSPITAL | Age: 65
Discharge: HOME | End: 2025-04-03
Attending: INTERNAL MEDICINE
Payer: COMMERCIAL

## 2025-04-03 DIAGNOSIS — K70.30 ALCOHOLIC CIRRHOSIS OF LIVER (HCC): ICD-10-CM

## 2025-04-03 PROCEDURE — 76705 ECHO EXAM OF ABDOMEN: CPT | Performed by: INTERNAL MEDICINE

## (undated) NOTE — LETTER
[facility] [address]  [phone]  [phone]  Authorization for Imaging Procedure     I hereby authorize PREM Serrano, my physician and his/her assistants (if applicable), which may include medical students, residents, and/or fellows, to perform the following procedure and administer such anesthesia as may be determined necessary by my physician: ULTRASOUND GUIDED PARACENTESIS WITH POSSIBLE INTRAVENOUS INFUSION OF ALBUMIN 25% on [address]. 2.  I recognize that during the procedure, unforeseen conditions may necessitate additional or different procedures than those listed above. I, therefore, further authorize and request that the above-named physician, assistants, or designees perform such procedures as are, in their judgment, necessary and desirable. 3.  My physician has discussed prior to my procedure the potential benefits, risks and side effects of this procedure; the likelihood of achieving goals; and potential problems that might occur during recuperation. They also discussed reasonable alternatives to the procedure, including risks, benefits, and side effects related to the alternatives and risks related to not receiving this procedure. I have had all my questions answered and I acknowledge that no guarantee has been made as to the result that may be obtained. 4.  Should the need arise during my procedure, which includes change of level of care prior to discharge, I also consent to the administration of blood and/or blood products. Further, I understand that despite careful testing and screening of blood or blood products by collecting agencies, I may still be subject to ill effects as a result of receiving a blood transfusion and/or blood products.  The following are some, but not all, of the potential risks that can occur: fever and allergic reactions, hemolytic reactions, transmission of diseases such as Hepatitis, AIDS and Cytomegalovirus (CMV) and fluid overload. In the event that I wish to have an autologous transfusion of my own blood, or a directed donor transfusion, I will discuss this with my physician. Check only if Refusing Blood or Blood Products  I understand refusal of blood or blood products as deemed necessary by my physician may have serious consequences to my condition to include possible death. I hereby assume responsibility for my refusal and release the hospital, its personnel, and my physicians from any responsibility for the consequences of my refusal.   [  ] Patient Refuses Blood      5. I authorize the use of any specimen, organs, tissues, body parts or foreign objects that may be removed from my body during the procedure for diagnosis, research or teaching purposes and their subsequent disposal by hospital authorities. I also authorize the release of specimen test results and/or written reports to my treating physician on the hospital medical staff or other referring or consulting physicians involved in my care, at the discretion of the Pathologist or my treating physician. 6.  I consent to the photographing or videotaping of the procedures to be performed, including appropriate portions of my body for medical, scientific, or educational purposes, provided my identity is not revealed by the pictures or by descriptive texts accompanying them. If the procedure has been photographed/videotaped, the physician will obtain the original picture, image, videotape or CD. The hospital will not be responsible for storage, release or maintenance of the picture, image, tape or CD.   7.  I consent to the presence of a  or observers in the operating room as deemed necessary by my physician or their designees. 8.  I recognize that in the event my procedure results in extended X-Ray/fluoroscopy time, I may develop a skin reaction. 9.   If I have a Do Not Attempt Resuscitation (DNAR) order in place, that status will be suspended while in the operating room, procedural suite, and during the recovery period unless otherwise explicitly stated by me (or a person authorized to consent on my behalf). The performing physician or my attending physician will determine when the applicable recovery period ends for purposes of reinstating the DNAR order. 10.  I acknowledge that my physician has explained sedation/analgesia administration to me including the risk and benefits I consent to the administration of sedation/analgesia as may be necessary or desirable in the judgment of my physician. I CERTIFY THAT I HAVE READ AND FULLY UNDERSTAND THE ABOVE CONSENT FOR THE PROCEDURE. Signature of Patient: _____________________________________________________________  Responsible person in case of minor, unconscious: ____________________________________  Relationship to patient:  __________________________________________________________  Signature of Witness: _______________________________Date: _________Time: __________    Statement of Physician: My signature below affirms that prior to the time of the procedure, I have explained to the patient and/or his guardian, the risks and benefits involved in the proposed treatment and any reasonable alternative to the proposed treatment. I have also explained the risks and benefits involved in the refusal of the proposed treatment and have answered the patient's questions. If I have a significant financial interest in a co-management agreement or a significant financial interest in any product or implant, or other significant relationship used in the procedure/surgery, I have disclosed this and had a discussion with my patient.   Signature of Physician:   _________________________________Date:_____________Time:________    Patient Name: Brigitte Quezada : 1960  Printed: 2023   Medical Record #: [id number]

## (undated) NOTE — LETTER
Authorization for Imaging Procedure  Date of Procedure: 7/21/23    I hereby authorize PREM Lucero, my physician and his/her assistants (if applicable), which may include medical students, residents, and/or fellows, to perform the following procedure and administer such anesthesia as may be determined necessary by my physician: [address] on [facility]. 2.  I recognize that during the procedure, unforeseen conditions may necessitate additional or different procedures than those listed above. I, therefore, further authorize and request that the above-named physician, assistants, or designees perform such procedures as are, in their judgment, necessary and desirable. 3.  My physician has discussed prior to my procedure the potential benefits, risks and side effects of this procedure; the likelihood of achieving goals; and potential problems that might occur during recuperation. They also discussed reasonable alternatives to the procedure, including risks, benefits, and side effects related to the alternatives and risks related to not receiving this procedure. I have had all my questions answered and I acknowledge that no guarantee has been made as to the result that may be obtained. 4.  Should the need arise during my procedure, which includes change of level of care prior to discharge, I also consent to the administration of blood and/or blood products. Further, I understand that despite careful testing and screening of blood or blood products by collecting agencies, I may still be subject to ill effects as a result of receiving a blood transfusion and/or blood products. The following are some, but not all, of the potential risks that can occur: fever and allergic reactions, hemolytic reactions, transmission of diseases such as Hepatitis, AIDS and Cytomegalovirus (CMV) and fluid overload.  In the event that I wish to have an autologous transfusion of my own blood, or a directed donor transfusion, I will discuss this with my physician. Check only if Refusing Blood or Blood Products  I understand refusal of blood or blood products as deemed necessary by my physician may have serious consequences to my condition to include possible death. I hereby assume responsibility for my refusal and release the hospital, its personnel, and my physicians from any responsibility for the consequences of my refusal.   [  ] Patient Refuses Blood      5. I authorize the use of any specimen, organs, tissues, body parts or foreign objects that may be removed from my body during the procedure for diagnosis, research or teaching purposes and their subsequent disposal by hospital authorities. I also authorize the release of specimen test results and/or written reports to my treating physician on the hospital medical staff or other referring or consulting physicians involved in my care, at the discretion of the Pathologist or my treating physician. 6.  I consent to the photographing or videotaping of the procedures to be performed, including appropriate portions of my body for medical, scientific, or educational purposes, provided my identity is not revealed by the pictures or by descriptive texts accompanying them. If the procedure has been photographed/videotaped, the physician will obtain the original picture, image, videotape or CD. The hospital will not be responsible for storage, release or maintenance of the picture, image, tape or CD.   7.  I consent to the presence of a  or observers in the operating room as deemed necessary by my physician or their designees. 8.  I recognize that in the event my procedure results in extended X-Ray/fluoroscopy time, I may develop a skin reaction. 9.   If I have a Do Not Attempt Resuscitation (DNAR) order in place, that status will be suspended while in the operating room, procedural suite, and during the recovery period unless otherwise explicitly stated by me (or a person authorized to consent on my behalf). The performing physician or my attending physician will determine when the applicable recovery period ends for purposes of reinstating the DNAR order. 10.  I acknowledge that my physician has explained sedation/analgesia administration to me including the risk and benefits I consent to the administration of sedation/analgesia as may be necessary or desirable in the judgment of my physician. I CERTIFY THAT I HAVE READ AND FULLY UNDERSTAND THE ABOVE CONSENT FOR THE PROCEDURE. Signature of Patient: _____________________________________________________________  Responsible person in case of minor, unconscious: ____________________________________  Relationship to patient:  __________________________________________________________  Signature of Witness: _______________________________Date: _________Time: __________    Statement of Physician: My signature below affirms that prior to the time of the procedure, I have explained to the patient and/or his guardian, the risks and benefits involved in the proposed treatment and any reasonable alternative to the proposed treatment. I have also explained the risks and benefits involved in the refusal of the proposed treatment and have answered the patient's questions. If I have a significant financial interest in a co-management agreement or a significant financial interest in any product or implant, or other significant relationship used in the procedure/surgery, I have disclosed this and had a discussion with my patient.   Signature of Physician:   _________________________________Date:_____________Time:________    Patient Name: Hiram Alexander : 1960  Printed: 2023   Medical Record #: [id number]

## (undated) NOTE — LETTER
[address]  [address]  [phone]  Authorization for Imaging Procedure    I hereby authorize PREM Fajardo , my physician and his/her assistants (if applicable), which may include medical students, residents, and/or fellows, to perform the following procedure and administer such anesthesia as may be determined necessary by my physician: [address] on [address]. 2.  I recognize that during the procedure, unforeseen conditions may necessitate additional or different procedures than those listed above. I, therefore, further authorize and request that the above-named physician, assistants, or designees perform such procedures as are, in their judgment, necessary and desirable. 3.  My physician has discussed prior to my procedure the potential benefits, risks and side effects of this procedure; the likelihood of achieving goals; and potential problems that might occur during recuperation. They also discussed reasonable alternatives to the procedure, including risks, benefits, and side effects related to the alternatives and risks related to not receiving this procedure. I have had all my questions answered and I acknowledge that no guarantee has been made as to the result that may be obtained. 4.  Should the need arise during my procedure, which includes change of level of care prior to discharge, I also consent to the administration of blood and/or blood products. Further, I understand that despite careful testing and screening of blood or blood products by collecting agencies, I may still be subject to ill effects as a result of receiving a blood transfusion and/or blood products.  The following are some, but not all, of the potential risks that can occur: fever and allergic reactions, hemolytic reactions, transmission of diseases such as Hepatitis, AIDS and Cytomegalovirus (CMV) and fluid overload. In the event that I wish to have an autologous transfusion of my own blood, or a directed donor transfusion, I will discuss this with my physician. Check only if Refusing Blood or Blood Products  I understand refusal of blood or blood products as deemed necessary by my physician may have serious consequences to my condition to include possible death. I hereby assume responsibility for my refusal and release the hospital, its personnel, and my physicians from any responsibility for the consequences of my refusal.   [  ] Patient Refuses Blood      5. I authorize the use of any specimen, organs, tissues, body parts or foreign objects that may be removed from my body during the procedure for diagnosis, research or teaching purposes and their subsequent disposal by hospital authorities. I also authorize the release of specimen test results and/or written reports to my treating physician on the hospital medical staff or other referring or consulting physicians involved in my care, at the discretion of the Pathologist or my treating physician. 6.  I consent to the photographing or videotaping of the procedures to be performed, including appropriate portions of my body for medical, scientific, or educational purposes, provided my identity is not revealed by the pictures or by descriptive texts accompanying them. If the procedure has been photographed/videotaped, the physician will obtain the original picture, image, videotape or CD. The hospital will not be responsible for storage, release or maintenance of the picture, image, tape or CD.   7.  I consent to the presence of a  or observers in the operating room as deemed necessary by my physician or their designees. 8.  I recognize that in the event my procedure results in extended X-Ray/fluoroscopy time, I may develop a skin reaction. 9.   If I have a Do Not Attempt Resuscitation (DNAR) order in place, that status will be suspended while in the operating room, procedural suite, and during the recovery period unless otherwise explicitly stated by me (or a person authorized to consent on my behalf). The performing physician or my attending physician will determine when the applicable recovery period ends for purposes of reinstating the DNAR order. 10.  I acknowledge that my physician has explained sedation/analgesia administration to me including the risk and benefits I consent to the administration of sedation/analgesia as may be necessary or desirable in the judgment of my physician. I CERTIFY THAT I HAVE READ AND FULLY UNDERSTAND THE ABOVE CONSENT FOR THE PROCEDURE. Signature of Patient: _____________________________________________________________  Responsible person in case of minor, unconscious: ____________________________________  Relationship to patient:  __________________________________________________________  Signature of Witness: _______________________________Date: _________Time: __________    Statement of Physician: My signature below affirms that prior to the time of the procedure, I have explained to the patient and/or his guardian, the risks and benefits involved in the proposed treatment and any reasonable alternative to the proposed treatment. I have also explained the risks and benefits involved in the refusal of the proposed treatment and have answered the patient's questions. If I have a significant financial interest in a co-management agreement or a significant financial interest in any product or implant, or other significant relationship used in the procedure/surgery, I have disclosed this and had a discussion with my patient.   Signature of Physician:   _________________________________Date:_____________Time:________    Patient Name: Catia Medel : 1960  Printed: 2023   Medical Record #: [id number]

## (undated) NOTE — LETTER
[address] [facility] [address]  [phone]  Authorization for Imaging Procedure    I hereby authorize PREM Conte/PREM Dudley , my physician and his/her assistants (if applicable), which may include medical students, residents, and/or fellows, to perform the following procedure and administer such anesthesia as may be determined necessary by my physician: [address] on [address]. 2.  I recognize that during the procedure, unforeseen conditions may necessitate additional or different procedures than those listed above. I, therefore, further authorize and request that the above-named physician, assistants, or designees perform such procedures as are, in their judgment, necessary and desirable. 3.  My physician has discussed prior to my procedure the potential benefits, risks and side effects of this procedure; the likelihood of achieving goals; and potential problems that might occur during recuperation. They also discussed reasonable alternatives to the procedure, including risks, benefits, and side effects related to the alternatives and risks related to not receiving this procedure. I have had all my questions answered and I acknowledge that no guarantee has been made as to the result that may be obtained. 4.  Should the need arise during my procedure, which includes change of level of care prior to discharge, I also consent to the administration of blood and/or blood products. Further, I understand that despite careful testing and screening of blood or blood products by collecting agencies, I may still be subject to ill effects as a result of receiving a blood transfusion and/or blood products.  The following are some, but not all, of the potential risks that can occur: fever and allergic reactions, hemolytic reactions, transmission of diseases such as Hepatitis, AIDS and Cytomegalovirus (CMV) and fluid overload. In the event that I wish to have an autologous transfusion of my own blood, or a directed donor transfusion, I will discuss this with my physician. Check only if Refusing Blood or Blood Products  I understand refusal of blood or blood products as deemed necessary by my physician may have serious consequences to my condition to include possible death. I hereby assume responsibility for my refusal and release the hospital, its personnel, and my physicians from any responsibility for the consequences of my refusal.   [  ] Patient Refuses Blood      5. I authorize the use of any specimen, organs, tissues, body parts or foreign objects that may be removed from my body during the procedure for diagnosis, research or teaching purposes and their subsequent disposal by hospital authorities. I also authorize the release of specimen test results and/or written reports to my treating physician on the hospital medical staff or other referring or consulting physicians involved in my care, at the discretion of the Pathologist or my treating physician. 6.  I consent to the photographing or videotaping of the procedures to be performed, including appropriate portions of my body for medical, scientific, or educational purposes, provided my identity is not revealed by the pictures or by descriptive texts accompanying them. If the procedure has been photographed/videotaped, the physician will obtain the original picture, image, videotape or CD. The hospital will not be responsible for storage, release or maintenance of the picture, image, tape or CD.   7.  I consent to the presence of a  or observers in the operating room as deemed necessary by my physician or their designees. 8.  I recognize that in the event my procedure results in extended X-Ray/fluoroscopy time, I may develop a skin reaction. 9.   If I have a Do Not Attempt Resuscitation (DNAR) order in place, that status will be suspended while in the operating room, procedural suite, and during the recovery period unless otherwise explicitly stated by me (or a person authorized to consent on my behalf). The performing physician or my attending physician will determine when the applicable recovery period ends for purposes of reinstating the DNAR order. 10.  I acknowledge that my physician has explained sedation/analgesia administration to me including the risk and benefits I consent to the administration of sedation/analgesia as may be necessary or desirable in the judgment of my physician. I CERTIFY THAT I HAVE READ AND FULLY UNDERSTAND THE ABOVE CONSENT FOR THE PROCEDURE. Signature of Patient: _____________________________________________________________  Responsible person in case of minor, unconscious: ____________________________________  Relationship to patient:  __________________________________________________________  Signature of Witness: _______________________________Date: _________Time: __________    Statement of Physician: My signature below affirms that prior to the time of the procedure, I have explained to the patient and/or his guardian, the risks and benefits involved in the proposed treatment and any reasonable alternative to the proposed treatment. I have also explained the risks and benefits involved in the refusal of the proposed treatment and have answered the patient's questions. If I have a significant financial interest in a co-management agreement or a significant financial interest in any product or implant, or other significant relationship used in the procedure/surgery, I have disclosed this and had a discussion with my patient.   Signature of Physician:   _________________________________Date:_____________Time:________    Patient Name: Juwan Garcia : 1960  Printed: 2023   Medical Record #: [id number]

## (undated) NOTE — LETTER
Authorization for Imaging Procedure  Date of Procedure:     I hereby authorize CINDY AMARO/ Gonzales AMARO, my physician and his/her assistants (if applicable), which may include medical students, residents, and/or fellows, to perform the following procedure and administer such anesthesia as may be determined necessary by my physician: ULTRASOUND GUIDED PARACENTESIS WITH POSSIBLE INTRAVENOUS INFUSION OF 25% ALBUMIN on Aliyah Hamming. 2.  I recognize that during the procedure, unforeseen conditions may necessitate additional or different procedures than those listed above. I, therefore, further authorize and request that the above-named physician, assistants, or designees perform such procedures as are, in their judgment, necessary and desirable. 3.  My physician has discussed prior to my procedure the potential benefits, risks and side effects of this procedure; the likelihood of achieving goals; and potential problems that might occur during recuperation. They also discussed reasonable alternatives to the procedure, including risks, benefits, and side effects related to the alternatives and risks related to not receiving this procedure. I have had all my questions answered and I acknowledge that no guarantee has been made as to the result that may be obtained. 4.  Should the need arise during my procedure, which includes change of level of care prior to discharge, I also consent to the administration of blood and/or blood products. Further, I understand that despite careful testing and screening of blood or blood products by collecting agencies, I may still be subject to ill effects as a result of receiving a blood transfusion and/or blood products. The following are some, but not all, of the potential risks that can occur: fever and allergic reactions, hemolytic reactions, transmission of diseases such as Hepatitis, AIDS and Cytomegalovirus (CMV) and fluid overload.  In the event that I wish to have an autologous transfusion of my own blood, or a directed donor transfusion, I will discuss this with my physician. Check only if Refusing Blood or Blood Products  I understand refusal of blood or blood products as deemed necessary by my physician may have serious consequences to my condition to include possible death. I hereby assume responsibility for my refusal and release the hospital, its personnel, and my physicians from any responsibility for the consequences of my refusal.   [  ] Patient Refuses Blood      5. I authorize the use of any specimen, organs, tissues, body parts or foreign objects that may be removed from my body during the procedure for diagnosis, research or teaching purposes and their subsequent disposal by hospital authorities. I also authorize the release of specimen test results and/or written reports to my treating physician on the hospital medical staff or other referring or consulting physicians involved in my care, at the discretion of the Pathologist or my treating physician. 6.  I consent to the photographing or videotaping of the procedures to be performed, including appropriate portions of my body for medical, scientific, or educational purposes, provided my identity is not revealed by the pictures or by descriptive texts accompanying them. If the procedure has been photographed/videotaped, the physician will obtain the original picture, image, videotape or CD. The hospital will not be responsible for storage, release or maintenance of the picture, image, tape or CD.   7.  I consent to the presence of a  or observers in the operating room as deemed necessary by my physician or their designees. 8.  I recognize that in the event my procedure results in extended X-Ray/fluoroscopy time, I may develop a skin reaction. 9.   If I have a Do Not Attempt Resuscitation (DNAR) order in place, that status will be suspended while in the operating room, procedural suite, and during the recovery period unless otherwise explicitly stated by me (or a person authorized to consent on my behalf). The performing physician or my attending physician will determine when the applicable recovery period ends for purposes of reinstating the DNAR order. 10.  I acknowledge that my physician has explained sedation/analgesia administration to me including the risk and benefits I consent to the administration of sedation/analgesia as may be necessary or desirable in the judgment of my physician. I CERTIFY THAT I HAVE READ AND FULLY UNDERSTAND THE ABOVE CONSENT FOR THE PROCEDURE    Signature of Patient: _____________________________________________________________  Responsible person in case of minor, unconscious: ____________________________________  Relationship to patient:  __________________________________________________________  Signature of Witness: _______________________________Date: _________Time: __________    Statement of Physician: My signature below affirms that prior to the time of the procedure, I have explained to the patient and/or his guardian, the risks and benefits involved in the proposed treatment and any reasonable alternative to the proposed treatment. I have also explained the risks and benefits involved in the refusal of the proposed treatment and have answered the patient's questions. If I have a significant financial interest in a co-management agreement or a significant financial interest in any product or implant, or other significant relationship used in the procedure/surgery, I have disclosed this and had a discussion with my patient.   Signature of Physician:   _________________________________Date:_____________Time:________    Patient Name: Noel Kelley : 1960  Printed: July 10, 2023   Medical Record #: [id number]

## (undated) NOTE — LETTER
[facility] [address]  [phone]  [phone]  Authorization for Imaging Procedure  Date of Procedure: 9/5/2023    I hereby authorize PREM Gallagher/ [facility] PREM DEVINE, my physician and his/her assistants (if applicable), which may include medical students, residents, and/or fellows, to perform the following procedure and administer such anesthesia as may be determined necessary by my physician: [address] on Kristi Schaeffer. 2.  I recognize that during the procedure, unforeseen conditions may necessitate additional or different procedures than those listed above. I, therefore, further authorize and request that the above-named physician, assistants, or designees perform such procedures as are, in their judgment, necessary and desirable. 3.  My physician has discussed prior to my procedure the potential benefits, risks and side effects of this procedure; the likelihood of achieving goals; and potential problems that might occur during recuperation. They also discussed reasonable alternatives to the procedure, including risks, benefits, and side effects related to the alternatives and risks related to not receiving this procedure. I have had all my questions answered and I acknowledge that no guarantee has been made as to the result that may be obtained. 4.  Should the need arise during my procedure, which includes change of level of care prior to discharge, I also consent to the administration of blood and/or blood products. Further, I understand that despite careful testing and screening of blood or blood products by collecting agencies, I may still be subject to ill effects as a result of receiving a blood transfusion and/or blood products.  The following are some, but not all, of the potential risks that can occur: fever and allergic reactions, hemolytic reactions, transmission of diseases such as Hepatitis, AIDS and Cytomegalovirus (CMV) and fluid overload. In the event that I wish to have an autologous transfusion of my own blood, or a directed donor transfusion, I will discuss this with my physician. Check only if Refusing Blood or Blood Products  I understand refusal of blood or blood products as deemed necessary by my physician may have serious consequences to my condition to include possible death. I hereby assume responsibility for my refusal and release the hospital, its personnel, and my physicians from any responsibility for the consequences of my refusal.   [  ] Patient Refuses Blood      5. I authorize the use of any specimen, organs, tissues, body parts or foreign objects that may be removed from my body during the procedure for diagnosis, research or teaching purposes and their subsequent disposal by hospital authorities. I also authorize the release of specimen test results and/or written reports to my treating physician on the hospital medical staff or other referring or consulting physicians involved in my care, at the discretion of the Pathologist or my treating physician. 6.  I consent to the photographing or videotaping of the procedures to be performed, including appropriate portions of my body for medical, scientific, or educational purposes, provided my identity is not revealed by the pictures or by descriptive texts accompanying them. If the procedure has been photographed/videotaped, the physician will obtain the original picture, image, videotape or CD. The hospital will not be responsible for storage, release or maintenance of the picture, image, tape or CD.   7.  I consent to the presence of a  or observers in the operating room as deemed necessary by my physician or their designees. 8.  I recognize that in the event my procedure results in extended X-Ray/fluoroscopy time, I may develop a skin reaction. 9.   If I have a Do Not Attempt Resuscitation (DNAR) order in place, that status will be suspended while in the operating room, procedural suite, and during the recovery period unless otherwise explicitly stated by me (or a person authorized to consent on my behalf). The performing physician or my attending physician will determine when the applicable recovery period ends for purposes of reinstating the DNAR order. 10.  I acknowledge that my physician has explained sedation/analgesia administration to me including the risk and benefits I consent to the administration of sedation/analgesia as may be necessary or desirable in the judgment of my physician. I CERTIFY THAT I HAVE READ AND FULLY UNDERSTAND THE ABOVE CONSENT FOR THE PROCEDURE. Signature of Patient: _____________________________________________________________  Responsible person in case of minor, unconscious: ____________________________________  Relationship to patient:  __________________________________________________________  Signature of Witness: _______________________________Date: _________Time: __________    Statement of Physician: My signature below affirms that prior to the time of the procedure, I have explained to the patient and/or his guardian, the risks and benefits involved in the proposed treatment and any reasonable alternative to the proposed treatment. I have also explained the risks and benefits involved in the refusal of the proposed treatment and have answered the patient's questions. If I have a significant financial interest in a co-management agreement or a significant financial interest in any product or implant, or other significant relationship used in the procedure/surgery, I have disclosed this and had a discussion with my patient.   Signature of Physician:   _________________________________Date:_____________Time:________    Patient Name: Aliyah Kim : 1960  Printed: 2023   Medical Record #: [id number]

## (undated) NOTE — LETTER
[address] [address]  Authorization for Imaging Procedure  Date of Procedure:     I hereby authorize Duke AMARO, my physician and his/her assistants (if applicable), which may include medical students, residents, and/or fellows, to perform the following procedure and administer such anesthesia as may be determined necessary by my physician: ULTRASOUND GUIDED PARACENTESIS WITH POSSIBLE INTRAVENOUS INFUSION OF 25% ALBUMIN on Ezella Berth. 2.  I recognize that during the procedure, unforeseen conditions may necessitate additional or different procedures than those listed above. I, therefore, further authorize and request that the above-named physician, assistants, or designees perform such procedures as are, in their judgment, necessary and desirable. 3.  My physician has discussed prior to my procedure the potential benefits, risks and side effects of this procedure; the likelihood of achieving goals; and potential problems that might occur during recuperation. They also discussed reasonable alternatives to the procedure, including risks, benefits, and side effects related to the alternatives and risks related to not receiving this procedure. I have had all my questions answered and I acknowledge that no guarantee has been made as to the result that may be obtained. 4.  Should the need arise during my procedure, which includes change of level of care prior to discharge, I also consent to the administration of blood and/or blood products. Further, I understand that despite careful testing and screening of blood or blood products by collecting agencies, I may still be subject to ill effects as a result of receiving a blood transfusion and/or blood products.  The following are some, but not all, of the potential risks that can occur: fever and allergic reactions, hemolytic reactions, transmission of diseases such as Hepatitis, AIDS and Cytomegalovirus (CMV) and fluid overload. In the event that I wish to have an autologous transfusion of my own blood, or a directed donor transfusion, I will discuss this with my physician. Check only if Refusing Blood or Blood Products  I understand refusal of blood or blood products as deemed necessary by my physician may have serious consequences to my condition to include possible death. I hereby assume responsibility for my refusal and release the hospital, its personnel, and my physicians from any responsibility for the consequences of my refusal.   [  ] Patient Refuses Blood      5. I authorize the use of any specimen, organs, tissues, body parts or foreign objects that may be removed from my body during the procedure for diagnosis, research or teaching purposes and their subsequent disposal by hospital authorities. I also authorize the release of specimen test results and/or written reports to my treating physician on the hospital medical staff or other referring or consulting physicians involved in my care, at the discretion of the Pathologist or my treating physician. 6.  I consent to the photographing or videotaping of the procedures to be performed, including appropriate portions of my body for medical, scientific, or educational purposes, provided my identity is not revealed by the pictures or by descriptive texts accompanying them. If the procedure has been photographed/videotaped, the physician will obtain the original picture, image, videotape or CD. The hospital will not be responsible for storage, release or maintenance of the picture, image, tape or CD.   7.  I consent to the presence of a  or observers in the operating room as deemed necessary by my physician or their designees. 8.  I recognize that in the event my procedure results in extended X-Ray/fluoroscopy time, I may develop a skin reaction. 9.   If I have a Do Not Attempt Resuscitation (DNAR) order in place, that status will be suspended while in the operating room, procedural suite, and during the recovery period unless otherwise explicitly stated by me (or a person authorized to consent on my behalf). The performing physician or my attending physician will determine when the applicable recovery period ends for purposes of reinstating the DNAR order. 10.  I acknowledge that my physician has explained sedation/analgesia administration to me including the risk and benefits I consent to the administration of sedation/analgesia as may be necessary or desirable in the judgment of my physician. I CERTIFY THAT I HAVE READ AND FULLY UNDERSTAND THE ABOVE CONSENT FOR THE PROCEDURE    Signature of Patient: _____________________________________________________________  Responsible person in case of minor, unconscious: ____________________________________  Relationship to patient:  __________________________________________________________  Signature of Witness: _______________________________Date: _________Time: __________    Statement of Physician: My signature below affirms that prior to the time of the procedure, I have explained to the patient and/or his guardian, the risks and benefits involved in the proposed treatment and any reasonable alternative to the proposed treatment. I have also explained the risks and benefits involved in the refusal of the proposed treatment and have answered the patient's questions. If I have a significant financial interest in a co-management agreement or a significant financial interest in any product or implant, or other significant relationship used in the procedure/surgery, I have disclosed this and had a discussion with my patient.   Signature of Physician:   _________________________________Date:_____________Time:________    Patient Name: Kristi Schaeffer : 1960  Printed: 2023   Medical Record #: [id number]

## (undated) NOTE — LETTER
[address] [facility] [address]  [phone]  Authorization for Imaging Procedure    I hereby authorize PREM Diego/PREM Montenegro,  my physician and his/her assistants (if applicable), which may include medical students, residents, and/or fellows, to perform the following procedure and administer such anesthesia as may be determined necessary by my physician: [address] on [facility]. 2.  I recognize that during the procedure, unforeseen conditions may necessitate additional or different procedures than those listed above. I, therefore, further authorize and request that the above-named physician, assistants, or designees perform such procedures as are, in their judgment, necessary and desirable. 3.  My physician has discussed prior to my procedure the potential benefits, risks and side effects of this procedure; the likelihood of achieving goals; and potential problems that might occur during recuperation. They also discussed reasonable alternatives to the procedure, including risks, benefits, and side effects related to the alternatives and risks related to not receiving this procedure. I have had all my questions answered and I acknowledge that no guarantee has been made as to the result that may be obtained. 4.  Should the need arise during my procedure, which includes change of level of care prior to discharge, I also consent to the administration of blood and/or blood products. Further, I understand that despite careful testing and screening of blood or blood products by collecting agencies, I may still be subject to ill effects as a result of receiving a blood transfusion and/or blood products.  The following are some, but not all, of the potential risks that can occur: fever and allergic reactions, hemolytic reactions, transmission of diseases such as Hepatitis, AIDS and Cytomegalovirus (CMV) and fluid overload. In the event that I wish to have an autologous transfusion of my own blood, or a directed donor transfusion, I will discuss this with my physician. Check only if Refusing Blood or Blood Products  I understand refusal of blood or blood products as deemed necessary by my physician may have serious consequences to my condition to include possible death. I hereby assume responsibility for my refusal and release the hospital, its personnel, and my physicians from any responsibility for the consequences of my refusal.   [  ] Patient Refuses Blood      5. I authorize the use of any specimen, organs, tissues, body parts or foreign objects that may be removed from my body during the procedure for diagnosis, research or teaching purposes and their subsequent disposal by hospital authorities. I also authorize the release of specimen test results and/or written reports to my treating physician on the hospital medical staff or other referring or consulting physicians involved in my care, at the discretion of the Pathologist or my treating physician. 6.  I consent to the photographing or videotaping of the procedures to be performed, including appropriate portions of my body for medical, scientific, or educational purposes, provided my identity is not revealed by the pictures or by descriptive texts accompanying them. If the procedure has been photographed/videotaped, the physician will obtain the original picture, image, videotape or CD. The hospital will not be responsible for storage, release or maintenance of the picture, image, tape or CD.   7.  I consent to the presence of a  or observers in the operating room as deemed necessary by my physician or their designees. 8.  I recognize that in the event my procedure results in extended X-Ray/fluoroscopy time, I may develop a skin reaction. 9.   If I have a Do Not Attempt Resuscitation (DNAR) order in place, that status will be suspended while in the operating room, procedural suite, and during the recovery period unless otherwise explicitly stated by me (or a person authorized to consent on my behalf). The performing physician or my attending physician will determine when the applicable recovery period ends for purposes of reinstating the DNAR order. 10.  I acknowledge that my physician has explained sedation/analgesia administration to me including the risk and benefits I consent to the administration of sedation/analgesia as may be necessary or desirable in the judgment of my physician. I CERTIFY THAT I HAVE READ AND FULLY UNDERSTAND THE ABOVE CONSENT FOR THE PROCEDURE. Signature of Patient: _____________________________________________________________  Responsible person in case of minor, unconscious: ____________________________________  Relationship to patient:  __________________________________________________________  Signature of Witness: _______________________________Date: _________Time: __________    Statement of Physician: My signature below affirms that prior to the time of the procedure, I have explained to the patient and/or his guardian, the risks and benefits involved in the proposed treatment and any reasonable alternative to the proposed treatment. I have also explained the risks and benefits involved in the refusal of the proposed treatment and have answered the patient's questions. If I have a significant financial interest in a co-management agreement or a significant financial interest in any product or implant, or other significant relationship used in the procedure/surgery, I have disclosed this and had a discussion with my patient.   Signature of Physician:   _________________________________Date:_____________Time:________    Patient Name: Tarsha Willune : 1960  Printed: August 3, 2023   Medical Record #: [id number]

## (undated) NOTE — LETTER
[facility] [place] [id number]  [phone]  Authorization for Imaging Procedure    I hereby authorize [facility], my physician and his/her assistants (if applicable), which may include medical students, residents, and/or fellows, to perform the following procedure and administer such anesthesia as may be determined necessary by my physician: ULTRASOUND GUIDE PARACENTESIS WITH POSSIBLE INFUSION OF ALBUMIN on Erika Liao. 2.  I recognize that during the procedure, unforeseen conditions may necessitate additional or different procedures than those listed above. I, therefore, further authorize and request that the above-named physician, assistants, or designees perform such procedures as are, in their judgment, necessary and desirable. 3.  My physician has discussed prior to my procedure the potential benefits, risks and side effects of this procedure; the likelihood of achieving goals; and potential problems that might occur during recuperation. They also discussed reasonable alternatives to the procedure, including risks, benefits, and side effects related to the alternatives and risks related to not receiving this procedure. I have had all my questions answered and I acknowledge that no guarantee has been made as to the result that may be obtained. 4.  Should the need arise during my procedure, which includes change of level of care prior to discharge, I also consent to the administration of blood and/or blood products. Further, I understand that despite careful testing and screening of blood or blood products by collecting agencies, I may still be subject to ill effects as a result of receiving a blood transfusion and/or blood products.  The following are some, but not all, of the potential risks that can occur: fever and allergic reactions, hemolytic reactions, transmission of diseases such as Hepatitis, AIDS and Cytomegalovirus (CMV) and fluid overload. In the event that I wish to have an autologous transfusion of my own blood, or a directed donor transfusion, I will discuss this with my physician. Check only if Refusing Blood or Blood Products  I understand refusal of blood or blood products as deemed necessary by my physician may have serious consequences to my condition to include possible death. I hereby assume responsibility for my refusal and release the hospital, its personnel, and my physicians from any responsibility for the consequences of my refusal.   [  ] Patient Refuses Blood      5. I authorize the use of any specimen, organs, tissues, body parts or foreign objects that may be removed from my body during the procedure for diagnosis, research or teaching purposes and their subsequent disposal by hospital authorities. I also authorize the release of specimen test results and/or written reports to my treating physician on the hospital medical staff or other referring or consulting physicians involved in my care, at the discretion of the Pathologist or my treating physician. 6.  I consent to the photographing or videotaping of the procedures to be performed, including appropriate portions of my body for medical, scientific, or educational purposes, provided my identity is not revealed by the pictures or by descriptive texts accompanying them. If the procedure has been photographed/videotaped, the physician will obtain the original picture, image, videotape or CD. The hospital will not be responsible for storage, release or maintenance of the picture, image, tape or CD.   7.  I consent to the presence of a  or observers in the operating room as deemed necessary by my physician or their designees. 8.  I recognize that in the event my procedure results in extended X-Ray/fluoroscopy time, I may develop a skin reaction. 9.   If I have a Do Not Attempt Resuscitation (DNAR) order in place, that status will be suspended while in the operating room, procedural suite, and during the recovery period unless otherwise explicitly stated by me (or a person authorized to consent on my behalf). The performing physician or my attending physician will determine when the applicable recovery period ends for purposes of reinstating the DNAR order. 10.  I acknowledge that my physician has explained sedation/analgesia administration to me including the risk and benefits I consent to the administration of sedation/analgesia as may be necessary or desirable in the judgment of my physician. I CERTIFY THAT I HAVE READ AND FULLY UNDERSTAND THE ABOVE CONSENT FOR THE PROCEDURE. Signature of Patient: _____________________________________________________________  Responsible person in case of minor, unconscious: ____________________________________  Relationship to patient:  __________________________________________________________  Signature of Witness: _______________________________Date: _________Time: __________    Statement of Physician: My signature below affirms that prior to the time of the procedure, I have explained to the patient and/or his guardian, the risks and benefits involved in the proposed treatment and any reasonable alternative to the proposed treatment. I have also explained the risks and benefits involved in the refusal of the proposed treatment and have answered the patient's questions. If I have a significant financial interest in a co-management agreement or a significant financial interest in any product or implant, or other significant relationship used in the procedure/surgery, I have disclosed this and had a discussion with my patient.   Signature of Physician:   _________________________________Date:_____________Time:________    Patient Name: Jhoan Mckenzie : 1960  Printed: 2023   Medical Record #: [id number]